# Patient Record
Sex: FEMALE | Race: WHITE | NOT HISPANIC OR LATINO
[De-identification: names, ages, dates, MRNs, and addresses within clinical notes are randomized per-mention and may not be internally consistent; named-entity substitution may affect disease eponyms.]

---

## 2017-01-03 ENCOUNTER — APPOINTMENT (OUTPATIENT)
Dept: INTERNAL MEDICINE | Facility: CLINIC | Age: 67
End: 2017-01-03

## 2017-01-24 ENCOUNTER — TRANSCRIPTION ENCOUNTER (OUTPATIENT)
Age: 67
End: 2017-01-24

## 2017-07-20 ENCOUNTER — APPOINTMENT (OUTPATIENT)
Dept: INTERNAL MEDICINE | Facility: CLINIC | Age: 67
End: 2017-07-20

## 2017-07-20 VITALS
DIASTOLIC BLOOD PRESSURE: 60 MMHG | HEIGHT: 63 IN | BODY MASS INDEX: 20.38 KG/M2 | OXYGEN SATURATION: 98 % | HEART RATE: 86 BPM | WEIGHT: 115 LBS | SYSTOLIC BLOOD PRESSURE: 100 MMHG

## 2017-10-30 ENCOUNTER — APPOINTMENT (OUTPATIENT)
Dept: INTERNAL MEDICINE | Facility: CLINIC | Age: 67
End: 2017-10-30
Payer: MEDICARE

## 2017-10-30 VITALS
SYSTOLIC BLOOD PRESSURE: 112 MMHG | DIASTOLIC BLOOD PRESSURE: 64 MMHG | OXYGEN SATURATION: 98 % | HEART RATE: 77 BPM | BODY MASS INDEX: 20.2 KG/M2 | WEIGHT: 114 LBS | HEIGHT: 63 IN

## 2017-10-30 PROCEDURE — 99214 OFFICE O/P EST MOD 30 MIN: CPT

## 2018-04-28 ENCOUNTER — EMERGENCY (EMERGENCY)
Facility: HOSPITAL | Age: 68
LOS: 1 days | Discharge: HOME | End: 2018-04-28
Attending: EMERGENCY MEDICINE
Payer: MEDICARE

## 2018-04-28 VITALS
HEART RATE: 81 BPM | TEMPERATURE: 98 F | DIASTOLIC BLOOD PRESSURE: 71 MMHG | RESPIRATION RATE: 16 BRPM | SYSTOLIC BLOOD PRESSURE: 114 MMHG | OXYGEN SATURATION: 99 %

## 2018-04-28 VITALS
RESPIRATION RATE: 18 BRPM | SYSTOLIC BLOOD PRESSURE: 107 MMHG | DIASTOLIC BLOOD PRESSURE: 72 MMHG | HEART RATE: 95 BPM | OXYGEN SATURATION: 100 % | TEMPERATURE: 98 F

## 2018-04-28 LAB
ALBUMIN SERPL ELPH-MCNC: 3.8 G/DL — SIGNIFICANT CHANGE UP (ref 3.3–5)
ALP SERPL-CCNC: 61 U/L — SIGNIFICANT CHANGE UP (ref 40–120)
ALT FLD-CCNC: 29 U/L — SIGNIFICANT CHANGE UP (ref 10–45)
ANION GAP SERPL CALC-SCNC: 11 MMOL/L — SIGNIFICANT CHANGE UP (ref 5–17)
APPEARANCE UR: CLEAR — SIGNIFICANT CHANGE UP
APTT BLD: 29.7 SEC — SIGNIFICANT CHANGE UP (ref 27.5–37.4)
AST SERPL-CCNC: 44 U/L — HIGH (ref 10–40)
BASE EXCESS BLDV CALC-SCNC: 0.9 MMOL/L — SIGNIFICANT CHANGE UP (ref -2–2)
BASOPHILS # BLD AUTO: 0 K/UL — SIGNIFICANT CHANGE UP (ref 0–0.2)
BASOPHILS NFR BLD AUTO: 0.6 % — SIGNIFICANT CHANGE UP (ref 0–2)
BILIRUB SERPL-MCNC: 0.7 MG/DL — SIGNIFICANT CHANGE UP (ref 0.2–1.2)
BILIRUB UR-MCNC: NEGATIVE — SIGNIFICANT CHANGE UP
BUN SERPL-MCNC: 9 MG/DL — SIGNIFICANT CHANGE UP (ref 7–23)
C DIFF GDH STL QL: NEGATIVE — SIGNIFICANT CHANGE UP
C DIFF GDH STL QL: SIGNIFICANT CHANGE UP
CA-I SERPL-SCNC: 1.16 MMOL/L — SIGNIFICANT CHANGE UP (ref 1.12–1.3)
CALCIUM SERPL-MCNC: 9.1 MG/DL — SIGNIFICANT CHANGE UP (ref 8.4–10.5)
CHLORIDE BLDV-SCNC: 110 MMOL/L — HIGH (ref 96–108)
CHLORIDE SERPL-SCNC: 106 MMOL/L — SIGNIFICANT CHANGE UP (ref 96–108)
CO2 BLDV-SCNC: 28 MMOL/L — SIGNIFICANT CHANGE UP (ref 22–30)
CO2 SERPL-SCNC: 23 MMOL/L — SIGNIFICANT CHANGE UP (ref 22–31)
COLOR SPEC: COLORLESS — SIGNIFICANT CHANGE UP
CREAT SERPL-MCNC: 0.89 MG/DL — SIGNIFICANT CHANGE UP (ref 0.5–1.3)
DIFF PNL FLD: NEGATIVE — SIGNIFICANT CHANGE UP
EOSINOPHIL # BLD AUTO: 0.1 K/UL — SIGNIFICANT CHANGE UP (ref 0–0.5)
EOSINOPHIL NFR BLD AUTO: 1.4 % — SIGNIFICANT CHANGE UP (ref 0–6)
GAS PNL BLDV: 136 MMOL/L — SIGNIFICANT CHANGE UP (ref 136–145)
GAS PNL BLDV: SIGNIFICANT CHANGE UP
GAS PNL BLDV: SIGNIFICANT CHANGE UP
GLUCOSE BLDV-MCNC: 116 MG/DL — HIGH (ref 70–99)
GLUCOSE SERPL-MCNC: 114 MG/DL — HIGH (ref 70–99)
GLUCOSE UR QL: NEGATIVE — SIGNIFICANT CHANGE UP
HCO3 BLDV-SCNC: 26 MMOL/L — SIGNIFICANT CHANGE UP (ref 21–29)
HCT VFR BLD CALC: 46.4 % — HIGH (ref 34.5–45)
HCT VFR BLDA CALC: 46 % — SIGNIFICANT CHANGE UP (ref 39–50)
HGB BLD CALC-MCNC: 15.1 G/DL — SIGNIFICANT CHANGE UP (ref 11.5–15.5)
HGB BLD-MCNC: 15.7 G/DL — HIGH (ref 11.5–15.5)
HOROWITZ INDEX BLDV+IHG-RTO: SIGNIFICANT CHANGE UP
INR BLD: 1.08 RATIO — SIGNIFICANT CHANGE UP (ref 0.88–1.16)
KETONES UR-MCNC: NEGATIVE — SIGNIFICANT CHANGE UP
LACTATE BLDV-MCNC: 1.3 MMOL/L — SIGNIFICANT CHANGE UP (ref 0.7–2)
LEUKOCYTE ESTERASE UR-ACNC: NEGATIVE — SIGNIFICANT CHANGE UP
LIDOCAIN IGE QN: 18 U/L — SIGNIFICANT CHANGE UP (ref 7–60)
LYMPHOCYTES # BLD AUTO: 0.6 K/UL — LOW (ref 1–3.3)
LYMPHOCYTES # BLD AUTO: 13.2 % — SIGNIFICANT CHANGE UP (ref 13–44)
MAGNESIUM SERPL-MCNC: 1.8 MG/DL — SIGNIFICANT CHANGE UP (ref 1.6–2.6)
MCHC RBC-ENTMCNC: 31.9 PG — SIGNIFICANT CHANGE UP (ref 27–34)
MCHC RBC-ENTMCNC: 33.8 GM/DL — SIGNIFICANT CHANGE UP (ref 32–36)
MCV RBC AUTO: 94.4 FL — SIGNIFICANT CHANGE UP (ref 80–100)
MONOCYTES # BLD AUTO: 0.3 K/UL — SIGNIFICANT CHANGE UP (ref 0–0.9)
MONOCYTES NFR BLD AUTO: 7.1 % — SIGNIFICANT CHANGE UP (ref 2–14)
NEUTROPHILS # BLD AUTO: 3.6 K/UL — SIGNIFICANT CHANGE UP (ref 1.8–7.4)
NEUTROPHILS NFR BLD AUTO: 77.7 % — HIGH (ref 43–77)
NITRITE UR-MCNC: NEGATIVE — SIGNIFICANT CHANGE UP
PCO2 BLDV: 47 MMHG — SIGNIFICANT CHANGE UP (ref 35–50)
PH BLDV: 7.37 — SIGNIFICANT CHANGE UP (ref 7.35–7.45)
PH UR: 6 — SIGNIFICANT CHANGE UP (ref 5–8)
PLATELET # BLD AUTO: 103 K/UL — LOW (ref 150–400)
PO2 BLDV: 27 MMHG — SIGNIFICANT CHANGE UP (ref 25–45)
POTASSIUM BLDV-SCNC: 3.6 MMOL/L — SIGNIFICANT CHANGE UP (ref 3.5–5)
POTASSIUM SERPL-MCNC: 3.7 MMOL/L — SIGNIFICANT CHANGE UP (ref 3.5–5.3)
POTASSIUM SERPL-SCNC: 3.7 MMOL/L — SIGNIFICANT CHANGE UP (ref 3.5–5.3)
PROT SERPL-MCNC: 6.5 G/DL — SIGNIFICANT CHANGE UP (ref 6–8.3)
PROT UR-MCNC: NEGATIVE — SIGNIFICANT CHANGE UP
PROTHROM AB SERPL-ACNC: 11.8 SEC — SIGNIFICANT CHANGE UP (ref 9.8–12.7)
RBC # BLD: 4.91 M/UL — SIGNIFICANT CHANGE UP (ref 3.8–5.2)
RBC # FLD: 12.7 % — SIGNIFICANT CHANGE UP (ref 10.3–14.5)
SAO2 % BLDV: 44 % — LOW (ref 67–88)
SODIUM SERPL-SCNC: 140 MMOL/L — SIGNIFICANT CHANGE UP (ref 135–145)
SP GR SPEC: 1.02 — SIGNIFICANT CHANGE UP (ref 1.01–1.02)
UROBILINOGEN FLD QL: NEGATIVE — SIGNIFICANT CHANGE UP
WBC # BLD: 4.6 K/UL — SIGNIFICANT CHANGE UP (ref 3.8–10.5)
WBC # FLD AUTO: 4.6 K/UL — SIGNIFICANT CHANGE UP (ref 3.8–10.5)

## 2018-04-28 PROCEDURE — 82330 ASSAY OF CALCIUM: CPT

## 2018-04-28 PROCEDURE — 96374 THER/PROPH/DIAG INJ IV PUSH: CPT | Mod: XU

## 2018-04-28 PROCEDURE — 85730 THROMBOPLASTIN TIME PARTIAL: CPT

## 2018-04-28 PROCEDURE — 85610 PROTHROMBIN TIME: CPT

## 2018-04-28 PROCEDURE — 82947 ASSAY GLUCOSE BLOOD QUANT: CPT

## 2018-04-28 PROCEDURE — 74177 CT ABD & PELVIS W/CONTRAST: CPT | Mod: 26

## 2018-04-28 PROCEDURE — 83605 ASSAY OF LACTIC ACID: CPT

## 2018-04-28 PROCEDURE — 81003 URINALYSIS AUTO W/O SCOPE: CPT

## 2018-04-28 PROCEDURE — 82803 BLOOD GASES ANY COMBINATION: CPT

## 2018-04-28 PROCEDURE — 87086 URINE CULTURE/COLONY COUNT: CPT

## 2018-04-28 PROCEDURE — 87449 NOS EACH ORGANISM AG IA: CPT

## 2018-04-28 PROCEDURE — 87045 FECES CULTURE AEROBIC BACT: CPT

## 2018-04-28 PROCEDURE — 84132 ASSAY OF SERUM POTASSIUM: CPT

## 2018-04-28 PROCEDURE — 82435 ASSAY OF BLOOD CHLORIDE: CPT

## 2018-04-28 PROCEDURE — 99284 EMERGENCY DEPT VISIT MOD MDM: CPT | Mod: 25

## 2018-04-28 PROCEDURE — 99284 EMERGENCY DEPT VISIT MOD MDM: CPT | Mod: GC

## 2018-04-28 PROCEDURE — 87177 OVA AND PARASITES SMEARS: CPT

## 2018-04-28 PROCEDURE — 74177 CT ABD & PELVIS W/CONTRAST: CPT

## 2018-04-28 PROCEDURE — 87324 CLOSTRIDIUM AG IA: CPT

## 2018-04-28 PROCEDURE — 84295 ASSAY OF SERUM SODIUM: CPT

## 2018-04-28 PROCEDURE — 85027 COMPLETE CBC AUTOMATED: CPT

## 2018-04-28 PROCEDURE — 83735 ASSAY OF MAGNESIUM: CPT

## 2018-04-28 PROCEDURE — 87046 STOOL CULTR AEROBIC BACT EA: CPT

## 2018-04-28 PROCEDURE — 80053 COMPREHEN METABOLIC PANEL: CPT

## 2018-04-28 PROCEDURE — 83690 ASSAY OF LIPASE: CPT

## 2018-04-28 PROCEDURE — 85014 HEMATOCRIT: CPT

## 2018-04-28 RX ORDER — SODIUM CHLORIDE 9 MG/ML
3 INJECTION INTRAMUSCULAR; INTRAVENOUS; SUBCUTANEOUS ONCE
Qty: 0 | Refills: 0 | Status: COMPLETED | OUTPATIENT
Start: 2018-04-28 | End: 2018-04-28

## 2018-04-28 RX ORDER — ACETAMINOPHEN 500 MG
1000 TABLET ORAL ONCE
Qty: 0 | Refills: 0 | Status: COMPLETED | OUTPATIENT
Start: 2018-04-28 | End: 2018-04-28

## 2018-04-28 RX ORDER — SODIUM CHLORIDE 9 MG/ML
1000 INJECTION INTRAMUSCULAR; INTRAVENOUS; SUBCUTANEOUS ONCE
Qty: 0 | Refills: 0 | Status: COMPLETED | OUTPATIENT
Start: 2018-04-28 | End: 2018-04-28

## 2018-04-28 RX ADMIN — SODIUM CHLORIDE 3 MILLILITER(S): 9 INJECTION INTRAMUSCULAR; INTRAVENOUS; SUBCUTANEOUS at 11:24

## 2018-04-28 RX ADMIN — Medication 1000 MILLIGRAM(S): at 12:10

## 2018-04-28 RX ADMIN — SODIUM CHLORIDE 1000 MILLILITER(S): 9 INJECTION INTRAMUSCULAR; INTRAVENOUS; SUBCUTANEOUS at 11:46

## 2018-04-28 RX ADMIN — Medication 400 MILLIGRAM(S): at 11:46

## 2018-04-28 NOTE — ED ADULT NURSE NOTE - OBJECTIVE STATEMENT
69 y/o female c/o liquid diarrhea since 6am yesterday, with abdominal pain and burping. No recent travel, no sick contacts.  Denies fevers, nausea, vomiting, constipation, chest pain,  dyspnea. Pt states she lost 2 lbs over the past two days, stopped taking Miralax.   Pt ate eggplant and cheese dish that patient thought started this Thursday night.  No acute respiratory distress noted.

## 2018-04-28 NOTE — ED PROVIDER NOTE - PHYSICAL EXAMINATION
Attending Hull: Gen: NAD, heent: atrauamtic, eomi, perrla, mmm, op pink, uvula midline, neck; nttp, no nuchal rigidity, chest: nttp, no crepitus, cv: rrr, no murmurs, lungs: ctab, abd: soft, nontender, nondistended, no peritoneal signs, +BS, no guarding, ext: wwp, neg homans, skin: no rash, neuro: awake and alert, following commands, speech clear, sensation and strength intact, no focal deficits Attending Hull: Gen: NAD, heent: atrauamtic, eomi, perrla, mmm, op pink, uvula midline, neck; nttp, no nuchal rigidity, chest: nttp, no crepitus, cv: rrr, no murmurs, lungs: ctab, abd: soft, mild lower abdominal ttp nondistended, no peritoneal signs, +BS, no guarding, ext: wwp, neg homans, skin: no rash, neuro: awake and alert, following commands, speech clear, sensation and strength intact, no focal deficits

## 2018-04-28 NOTE — ED PROVIDER NOTE - OBJECTIVE STATEMENT
68y Female PMH IBS, Hashimoto's thyroiditis, hiatal hernia, adhesions PSH gynecological surgery complaining of diarrhea. Has been occurring since 6am yesterday, liquidy, associated with abdominal pain with burping. No nausea/vomiting. No recent antibiotics. Has omeprazole for her stomach. No constipation. Denies fevers, nausea, vomiting, constipation, chest pain, or dyspnea. Lost 2 lbs over the past two days, stopped taking Miralax. Ate eggplant and cheese dish that patient thought started this Thursday night. No obvious sick contacts, no recent travel.    PCP: Dr. Jil Tolbert  GI: Dr. Jennings  Card: Dr. Parvez Marroquin

## 2018-04-28 NOTE — ED PROVIDER NOTE - MEDICAL DECISION MAKING DETAILS
Attending Hull: 67 y/o female presenting with lower abdominal pain with associated diarrhea. no fevers or chills. the pt does have a h/o IBS. no vomiting. no blood in the stool. on exam abd soft with mild lower abdominal discomfort. no fevers. CT performed to further evaluate for colitis vs enteritis which was negative. pt able to tolerate po and appears well hydrated. has a GI physician to follow up with. will d/c

## 2018-04-29 LAB
CULTURE RESULTS: NO GROWTH — SIGNIFICANT CHANGE UP
SPECIMEN SOURCE: SIGNIFICANT CHANGE UP

## 2018-04-30 LAB
CULTURE RESULTS: SIGNIFICANT CHANGE UP
CULTURE RESULTS: SIGNIFICANT CHANGE UP
SPECIMEN SOURCE: SIGNIFICANT CHANGE UP
SPECIMEN SOURCE: SIGNIFICANT CHANGE UP

## 2018-05-29 ENCOUNTER — APPOINTMENT (OUTPATIENT)
Dept: INTERNAL MEDICINE | Facility: CLINIC | Age: 68
End: 2018-05-29
Payer: MEDICARE

## 2018-05-29 VITALS
BODY MASS INDEX: 19.84 KG/M2 | DIASTOLIC BLOOD PRESSURE: 68 MMHG | WEIGHT: 112 LBS | SYSTOLIC BLOOD PRESSURE: 104 MMHG | HEIGHT: 63 IN

## 2018-05-29 DIAGNOSIS — R00.2 PALPITATIONS: ICD-10-CM

## 2018-05-29 DIAGNOSIS — R14.1 GAS PAIN: ICD-10-CM

## 2018-05-29 PROCEDURE — 99214 OFFICE O/P EST MOD 30 MIN: CPT

## 2018-05-30 NOTE — PHYSICAL EXAM
[Well Developed] : well developed [Well-Appearing] : well-appearing [Memory Grossly Normal] : memory grossly normal [Alert and Oriented x3] : oriented to person, place, and time [Normal Insight/Judgement] : insight and judgment were intact [de-identified] : mild scoliosis; calf muscle nl , sl tender in one spot [de-identified] : anxious, tearful at times;

## 2018-05-30 NOTE — ASSESSMENT
[FreeTextEntry1] : Pt w above issues.\par OP, mild scoliosis, IBS, insomnia/anxiety.\par Long discussion about trying talk therapy for her anxiety/depression  and options discussed. She seems amenable. She has not shared any of these thoughts w friends/SO/family and seemed to feel better after our discussion. I think she would feel much better in therapy.\par Reassured about some of her other concerns: \par prob Raynauds in fingers; nl memory changes for aging (offered neuropsych testing but reminded her there is no cure for Alz yet); drink more water for yellow urine.\par calf mm appears muscular strain, offered Doppler, declined.\par Insomnia- sleep hygiene discussed; would d/c valium 2.5 mg she says that won't be a problem; can try OTCs such as benadryl, Zquill, melatonin.\par Re Handicap permit, I told her I did not think I could fill out given her daily workouts at the gym etc.\par \par

## 2018-05-30 NOTE — HISTORY OF PRESENT ILLNESS
[FreeTextEntry1] : f/u [de-identified] : Wanted to update me on recent subspecialty visits and to discuss a few issues; requesting handicap car sticker.\par Ongoing IBS w bloating, diarrhea, constipation; had hemorrhoid and saw blood,  EGD/[place] uptodate; seeing Dr. Zuñiga GI. "spastic colon".\par Sciatica, scoliosis, OP; T score -3.1\par  ?Hashimotos thy: saw 3 Endos- Dr. Amy Tolbert, Dr. Sexton then Dr. Tillman, who says she has no thy issue, no Hashimotos.\par Dr. MANTILLA started her on Fosamax wkly in addition to Ca plus D.\par She is exercising very regularly at the gym,  but feels her muscles are not where they should be.\par Ongoing insomnia, takes half of a 5 mg Valium in middle of night (wakes up after falling asleep well), written for her by ?Dr. Marroquin cardiol.\par Doesn't sleep well though and often has a headache. Didn't do well w Ambien, doesn't remember trying Temazepam (on her old list). Hasn't tried OTCs.\par c/o urine too yellow someitmes, no burning.\par sees Cardiol for palps, anxiety, TR on echo...h/o dizziness/palps w extensive w/u incl stress test, cardiac cath at [facility] in the past.\par Ongoing anxiety/existential sadness about time passing, mortality, kids/grandkids growing up. Has not tried therapy as we discussed. Once had therapy at time of her divorce many yrs ago, amicable.\par Ongoing stress w one of her sons  ( he has advanced Crohns) who says mean things to her on regular basis. 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 no recent long flights etc.\par \par \par

## 2018-07-09 ENCOUNTER — EMERGENCY (EMERGENCY)
Facility: HOSPITAL | Age: 68
LOS: 1 days | Discharge: HOME | End: 2018-07-09
Attending: EMERGENCY MEDICINE
Payer: MEDICARE

## 2018-07-09 VITALS
SYSTOLIC BLOOD PRESSURE: 91 MMHG | OXYGEN SATURATION: 96 % | HEART RATE: 88 BPM | DIASTOLIC BLOOD PRESSURE: 58 MMHG | RESPIRATION RATE: 20 BRPM | TEMPERATURE: 98 F | WEIGHT: 108.91 LBS | HEIGHT: 64 IN

## 2018-07-09 VITALS
TEMPERATURE: 98 F | HEART RATE: 73 BPM | DIASTOLIC BLOOD PRESSURE: 86 MMHG | SYSTOLIC BLOOD PRESSURE: 119 MMHG | OXYGEN SATURATION: 100 % | RESPIRATION RATE: 15 BRPM

## 2018-07-09 LAB
ALBUMIN SERPL ELPH-MCNC: 4 G/DL — SIGNIFICANT CHANGE UP (ref 3.3–5)
ALP SERPL-CCNC: 51 U/L — SIGNIFICANT CHANGE UP (ref 40–120)
ALT FLD-CCNC: 16 U/L — SIGNIFICANT CHANGE UP (ref 10–45)
ANION GAP SERPL CALC-SCNC: 12 MMOL/L — SIGNIFICANT CHANGE UP (ref 5–17)
AST SERPL-CCNC: 25 U/L — SIGNIFICANT CHANGE UP (ref 10–40)
BASOPHILS # BLD AUTO: 0 K/UL — SIGNIFICANT CHANGE UP (ref 0–0.2)
BASOPHILS NFR BLD AUTO: 0.4 % — SIGNIFICANT CHANGE UP (ref 0–2)
BILIRUB SERPL-MCNC: 1 MG/DL — SIGNIFICANT CHANGE UP (ref 0.2–1.2)
BUN SERPL-MCNC: 8 MG/DL — SIGNIFICANT CHANGE UP (ref 7–23)
CALCIUM SERPL-MCNC: 8.4 MG/DL — SIGNIFICANT CHANGE UP (ref 8.4–10.5)
CHLORIDE SERPL-SCNC: 100 MMOL/L — SIGNIFICANT CHANGE UP (ref 96–108)
CO2 SERPL-SCNC: 24 MMOL/L — SIGNIFICANT CHANGE UP (ref 22–31)
CREAT SERPL-MCNC: 0.86 MG/DL — SIGNIFICANT CHANGE UP (ref 0.5–1.3)
EOSINOPHIL # BLD AUTO: 0 K/UL — SIGNIFICANT CHANGE UP (ref 0–0.5)
EOSINOPHIL NFR BLD AUTO: 0.9 % — SIGNIFICANT CHANGE UP (ref 0–6)
GLUCOSE SERPL-MCNC: 100 MG/DL — HIGH (ref 70–99)
HCT VFR BLD CALC: 41.3 % — SIGNIFICANT CHANGE UP (ref 34.5–45)
HGB BLD-MCNC: 13.9 G/DL — SIGNIFICANT CHANGE UP (ref 11.5–15.5)
HPIV3 RNA SPEC QL NAA+PROBE: DETECTED
LYMPHOCYTES # BLD AUTO: 0.7 K/UL — LOW (ref 1–3.3)
LYMPHOCYTES # BLD AUTO: 16.5 % — SIGNIFICANT CHANGE UP (ref 13–44)
MCHC RBC-ENTMCNC: 31.7 PG — SIGNIFICANT CHANGE UP (ref 27–34)
MCHC RBC-ENTMCNC: 33.7 GM/DL — SIGNIFICANT CHANGE UP (ref 32–36)
MCV RBC AUTO: 94.1 FL — SIGNIFICANT CHANGE UP (ref 80–100)
MONOCYTES # BLD AUTO: 0.6 K/UL — SIGNIFICANT CHANGE UP (ref 0–0.9)
MONOCYTES NFR BLD AUTO: 12.4 % — SIGNIFICANT CHANGE UP (ref 2–14)
NEUTROPHILS # BLD AUTO: 3.1 K/UL — SIGNIFICANT CHANGE UP (ref 1.8–7.4)
NEUTROPHILS NFR BLD AUTO: 69.9 % — SIGNIFICANT CHANGE UP (ref 43–77)
PLATELET # BLD AUTO: 82 K/UL — LOW (ref 150–400)
POTASSIUM SERPL-MCNC: 3.8 MMOL/L — SIGNIFICANT CHANGE UP (ref 3.5–5.3)
POTASSIUM SERPL-SCNC: 3.8 MMOL/L — SIGNIFICANT CHANGE UP (ref 3.5–5.3)
PROT SERPL-MCNC: 6.5 G/DL — SIGNIFICANT CHANGE UP (ref 6–8.3)
RAPID RVP RESULT: DETECTED
RBC # BLD: 4.39 M/UL — SIGNIFICANT CHANGE UP (ref 3.8–5.2)
RBC # FLD: 11.9 % — SIGNIFICANT CHANGE UP (ref 10.3–14.5)
SODIUM SERPL-SCNC: 136 MMOL/L — SIGNIFICANT CHANGE UP (ref 135–145)
WBC # BLD: 4.5 K/UL — SIGNIFICANT CHANGE UP (ref 3.8–10.5)
WBC # FLD AUTO: 4.5 K/UL — SIGNIFICANT CHANGE UP (ref 3.8–10.5)

## 2018-07-09 PROCEDURE — 71046 X-RAY EXAM CHEST 2 VIEWS: CPT | Mod: 26

## 2018-07-09 PROCEDURE — 87633 RESP VIRUS 12-25 TARGETS: CPT

## 2018-07-09 PROCEDURE — 99284 EMERGENCY DEPT VISIT MOD MDM: CPT

## 2018-07-09 PROCEDURE — 87486 CHLMYD PNEUM DNA AMP PROBE: CPT

## 2018-07-09 PROCEDURE — 85027 COMPLETE CBC AUTOMATED: CPT

## 2018-07-09 PROCEDURE — 87581 M.PNEUMON DNA AMP PROBE: CPT

## 2018-07-09 PROCEDURE — 87798 DETECT AGENT NOS DNA AMP: CPT

## 2018-07-09 PROCEDURE — 93005 ELECTROCARDIOGRAM TRACING: CPT

## 2018-07-09 PROCEDURE — 71046 X-RAY EXAM CHEST 2 VIEWS: CPT

## 2018-07-09 PROCEDURE — 84484 ASSAY OF TROPONIN QUANT: CPT

## 2018-07-09 PROCEDURE — 99283 EMERGENCY DEPT VISIT LOW MDM: CPT | Mod: 25

## 2018-07-09 PROCEDURE — 80053 COMPREHEN METABOLIC PANEL: CPT

## 2018-07-09 RX ORDER — IBUPROFEN 200 MG
600 TABLET ORAL ONCE
Qty: 0 | Refills: 0 | Status: COMPLETED | OUTPATIENT
Start: 2018-07-09 | End: 2018-07-09

## 2018-07-09 RX ORDER — SODIUM CHLORIDE 9 MG/ML
1000 INJECTION INTRAMUSCULAR; INTRAVENOUS; SUBCUTANEOUS ONCE
Qty: 0 | Refills: 0 | Status: COMPLETED | OUTPATIENT
Start: 2018-07-09 | End: 2018-07-09

## 2018-07-09 RX ADMIN — Medication 600 MILLIGRAM(S): at 12:09

## 2018-07-09 RX ADMIN — SODIUM CHLORIDE 1000 MILLILITER(S): 9 INJECTION INTRAMUSCULAR; INTRAVENOUS; SUBCUTANEOUS at 12:10

## 2018-07-09 NOTE — ED ADULT NURSE NOTE - DISCHARGE TEACHING
follow up with pcp. Return for worsening s/.s. Patient and spouse verbalized understanding of d.c instructions

## 2018-07-09 NOTE — ED ADULT NURSE NOTE - OBJECTIVE STATEMENT
67 y/o female presenting to the ED via walking in complaining of rhinorrhea, dry cough, and diffuse body aches x 5 days. Patient went to urgent care 3 days ago and was prescribed Levaquin 750 mg. Patient also complaining of episode of palpitations yesterday and today  lasting for approximately 30 seconds. Patient denies chest pain, dizziness, n/v/d, numbness, tingling, SOB, fevers. Patient also watched her grandchild 2 days ago who now has similar symptoms. EKG completed on arrival. CM in place. Safety and comfort measures provided. A&OX3. Spouse at bedside.

## 2018-07-09 NOTE — ED PROVIDER NOTE - CARE PLAN
Principal Discharge DX:	URI (upper respiratory infection)  Assessment and plan of treatment:	Follow up with your Primary Care Physician within the next 2-3 days  Stay Well Hydrated throughout the day  Take Tylenol 650mg every 6 hours as needed for fever and/or pain  You may take an over the counter cough suppressant  Bring a copy of your test results with you to your appointment  Continue your current medication regimen  Return to the Emergency Room if you experience new or worsening symptoms

## 2018-07-09 NOTE — ED ADULT NURSE REASSESSMENT NOTE - NS ED NURSE REASSESS COMMENT FT1
Patiewnt appears to be resting comfortably in stretcher. Patient denies dizziness, chest pain, SOB, n/v/d, numbness, tingling. A&OX3. Safety and comfort measures provided.

## 2018-07-09 NOTE — ED ADULT NURSE REASSESSMENT NOTE - NS ED NURSE REASSESS COMMENT FT1
Patient appears to be resting comfortably in stretcher. Patient denies chest pain, dizziness, n/v/d, numbness, tingling. Patient states body aches have been getting better after medication. A&OX3. Patient pending RVP result. Safety and comfort measures provided.

## 2018-07-09 NOTE — ED PROVIDER NOTE - MEDICAL DECISION MAKING DETAILS
67 y/o female presenting to the ED due to myalgias and rhinorrhea for the past 5 days. She went recently to an urgent care and was given antibiotics for suspicion of pneumonia, she states that she didn't had a CXR done. Denies fever. Physical exam shows tenderness in upper extremities, otherwise unremarkable. Lungs clear to auscultation. Patient appears to have a viral illness. Will order CXR to r/o pneumonia and discharge the patient with symptomatic treatment.

## 2018-07-09 NOTE — ED PROVIDER NOTE - ENMT NEGATIVE STATEMENT, MLM
Ears: no ear pain and no hearing problems.Nose: no nasal congestion.Mouth/Throat: no dysphagia, no hoarseness and no throat pain.Neck: no lumps, no pain, no stiffness and no swollen glands.

## 2018-07-09 NOTE — ED PROVIDER NOTE - OBJECTIVE STATEMENT
68 year old female w complaint of cough, rhinorrhea and diffuse myalgias for the past 5 days. She was given Levaquin 750mg at an urgent care 3 days ago which she has been compliant on. She admits to palpitations yesterday and today lasting for seconds at a time. She watched her grandchild 2 days ago who has since also developed a runny nose. She denies fever, chills. She spoke w her cardiologist Parvez Marroquin who told her she may have Pneumonia. She has not shad a chest xray yet.

## 2018-07-09 NOTE — ED PROVIDER NOTE - ATTENDING CONTRIBUTION TO CARE
69 y/o female presenting with myalgias and rhinorrhea for the past 5 days. Denies fever, SOB. She has been taking Levofloxacin after going to an urgent care, she didn't had an CXR done. Physical exam unremarkable apart from mild tenderness with palpation of muscles on her upper extremities. CXR unremarkable, no signs of pneumonia. Patient appears to have a clinical picture of viral URI that is already improving. Was discharged home with symptomatic treatment and follow up with private MD.

## 2018-10-04 PROBLEM — E06.3 AUTOIMMUNE THYROIDITIS: Chronic | Status: ACTIVE | Noted: 2018-04-28

## 2018-10-04 PROBLEM — K58.9 IRRITABLE BOWEL SYNDROME WITHOUT DIARRHEA: Chronic | Status: ACTIVE | Noted: 2018-04-28

## 2018-10-04 PROBLEM — K58.9 IRRITABLE BOWEL SYNDROME, UNSPECIFIED: Chronic | Status: ACTIVE | Noted: 2018-04-28

## 2018-10-30 ENCOUNTER — APPOINTMENT (OUTPATIENT)
Dept: INTERNAL MEDICINE | Facility: CLINIC | Age: 68
End: 2018-10-30
Payer: MEDICARE

## 2018-10-30 VITALS
HEART RATE: 72 BPM | DIASTOLIC BLOOD PRESSURE: 60 MMHG | HEIGHT: 63 IN | SYSTOLIC BLOOD PRESSURE: 110 MMHG | BODY MASS INDEX: 20.38 KG/M2 | OXYGEN SATURATION: 98 % | WEIGHT: 115 LBS

## 2018-10-30 PROCEDURE — 99214 OFFICE O/P EST MOD 30 MIN: CPT | Mod: 25

## 2018-10-30 PROCEDURE — G0008: CPT

## 2018-10-30 PROCEDURE — 90662 IIV NO PRSV INCREASED AG IM: CPT

## 2018-11-01 NOTE — PHYSICAL EXAM
[No Acute Distress] : no acute distress [Normal Oropharynx] : the oropharynx was normal [Supple] : supple [Clear to Auscultation] : lungs were clear to auscultation bilaterally [Regular Rhythm] : with a regular rhythm [No Edema] : there was no peripheral edema [No Joint Swelling] : no joint swelling [No Rash] : no rash [de-identified] : anxious  [de-identified] : whitish coating of tongue

## 2018-11-01 NOTE — ASSESSMENT
[FreeTextEntry1] : Pt w severe anxiety, now having ? hallucinations (scary scene and/or music) or petit mal seizures as described.\par I have asked her in the past to see a therapist, she now seems amenable to some degree:\par referral made to Dr. Roca\par Neuro eval dr. Ramon\par \par Flu shot given.

## 2018-11-01 NOTE — HISTORY OF PRESENT ILLNESS
[FreeTextEntry8] : Here to update me and some new concerns:\par left- sided headache and occas when looks to the right sees "something scary and dark" ??hallucination- ? seizure--\par sees a word or scary scene or hears a scary song...lasts 1-5 secs- not assoc w the HAs.\par    ongoing existential concerns about aging\par     URI over summer went to [facility] placed on Levaquin and had bad rxn w severe body pain/shoulder pain. was deemed allergy to Levaquin at [facility].\par    stressed about memory changes short-term, names\par    white patch on tongue ;  she does chew on Tums; she saw ex husb who is dDS who felt related to nasal sprays incl flonase, atrovent;placed her on Biotene hasn’t' started yet, dry mouth also saw ENT\par    corns on toes will see Podiatry\par saggy skin does not like it\par    insomnia takes a piece of valium\par

## 2019-01-15 ENCOUNTER — APPOINTMENT (OUTPATIENT)
Dept: INTERNAL MEDICINE | Facility: CLINIC | Age: 69
End: 2019-01-15
Payer: MEDICARE

## 2019-01-15 VITALS
SYSTOLIC BLOOD PRESSURE: 132 MMHG | DIASTOLIC BLOOD PRESSURE: 68 MMHG | TEMPERATURE: 97.4 F | OXYGEN SATURATION: 99 % | HEART RATE: 86 BPM | WEIGHT: 121 LBS | HEIGHT: 63 IN | BODY MASS INDEX: 21.44 KG/M2

## 2019-01-15 LAB
BILIRUB UR QL STRIP: NEGATIVE
CLARITY UR: NORMAL
COLLECTION METHOD: NORMAL
GLUCOSE UR-MCNC: NEGATIVE
HCG UR QL: 0.2 EU/DL
HGB UR QL STRIP.AUTO: NORMAL
KETONES UR-MCNC: NORMAL
LEUKOCYTE ESTERASE UR QL STRIP: NORMAL
NITRITE UR QL STRIP: NEGATIVE
PH UR STRIP: 6
PROT UR STRIP-MCNC: NEGATIVE
SP GR UR STRIP: 1.02

## 2019-01-15 PROCEDURE — 99214 OFFICE O/P EST MOD 30 MIN: CPT | Mod: 25

## 2019-01-15 PROCEDURE — 81003 URINALYSIS AUTO W/O SCOPE: CPT | Mod: QW

## 2019-01-15 NOTE — ASSESSMENT
[FreeTextEntry1] : u/a tr leuks, will send for culture. (no burning but frequncy)\par \par Imp: IBS-c, anxiety, OP, sinus issues\par \par Abdo CT scan\par \par Reassurance. Cannot eval memory until sleep issues resolved, she will f/u w Dr. Epstein, ask hm to send me consult.\par Again declines behavioral health.

## 2019-01-15 NOTE — HISTORY OF PRESENT ILLNESS
[FreeTextEntry1] : f/u [de-identified] : Seeing GI Dr. Zuñiga for ongoing issues w bloating, constipation- Trulance , Linzess, Miralax, tums,  Levsin, omeprazole, zenpep;\par sched for CT Abdo\par gained wt lately not exercising s/p holidays\par  recent labs submitted, fine (ongoing low plts) saw heme in past\par Seeing Neuro Dr. Epstein for anxiety, sleep and memory issues, he is trying her on Amitryptaline increasing dose for sleep/anxiety; had sleep study and only gets about 4 hrs no sleep apnea.\par Has not agreed to any therapy after all.\par ENT for sinus issues, on atrovent spray\par Endo Primo for OP on Alendronate wkly\par c/o incr urination, no dysuria\par c/o thinning hair\par \par c/o dry mouth, on Biotene\par

## 2019-06-21 ENCOUNTER — TRANSCRIPTION ENCOUNTER (OUTPATIENT)
Age: 69
End: 2019-06-21

## 2019-07-07 ENCOUNTER — TRANSCRIPTION ENCOUNTER (OUTPATIENT)
Age: 69
End: 2019-07-07

## 2019-08-06 ENCOUNTER — APPOINTMENT (OUTPATIENT)
Dept: INTERNAL MEDICINE | Facility: CLINIC | Age: 69
End: 2019-08-06
Payer: MEDICARE

## 2019-08-06 VITALS
HEIGHT: 63 IN | BODY MASS INDEX: 21.62 KG/M2 | TEMPERATURE: 97.5 F | HEART RATE: 77 BPM | SYSTOLIC BLOOD PRESSURE: 100 MMHG | OXYGEN SATURATION: 99 % | WEIGHT: 122 LBS | DIASTOLIC BLOOD PRESSURE: 60 MMHG

## 2019-08-06 PROCEDURE — 99214 OFFICE O/P EST MOD 30 MIN: CPT | Mod: 25

## 2019-08-06 PROCEDURE — 90670 PCV13 VACCINE IM: CPT

## 2019-08-06 PROCEDURE — G0009: CPT

## 2019-08-07 NOTE — ASSESSMENT
[FreeTextEntry1] : We discussed her new diagnosis, ways to combat forgetfulness, incl continuing her exercising and avoiding alcohol(doesn't drink).\par I will request note from Dr. Mehta.\par We reviewed her list of complaints, she was reassured (saggy skin, frizzy hair, etc)\par She does need counseling, might be amenable in the Fall. (has not been amenable in the past).\par Mammo/US\par Gyn\par Prevnar given.\par \par

## 2019-08-07 NOTE — PHYSICAL EXAM
[No Acute Distress] : no acute distress [Well Nourished] : well nourished [Well Developed] : well developed [Well-Appearing] : well-appearing [Normal Sclera/Conjunctiva] : normal sclera/conjunctiva [PERRL] : pupils equal round and reactive to light [EOMI] : extraocular movements intact [Normal Outer Ear/Nose] : the outer ears and nose were normal in appearance [Normal Oropharynx] : the oropharynx was normal [No JVD] : no jugular venous distention [Supple] : supple [No Lymphadenopathy] : no lymphadenopathy [Thyroid Normal, No Nodules] : the thyroid was normal and there were no nodules present [No Respiratory Distress] : no respiratory distress  [No Accessory Muscle Use] : no accessory muscle use [Clear to Auscultation] : lungs were clear to auscultation bilaterally [Normal Rate] : normal rate  [Regular Rhythm] : with a regular rhythm [Normal S1, S2] : normal S1 and S2 [No Murmur] : no murmur heard [No Carotid Bruits] : no carotid bruits [No Varicosities] : no varicosities [No Abdominal Bruit] : a ~M bruit was not heard ~T in the abdomen [No Edema] : there was no peripheral edema [Pedal Pulses Present] : the pedal pulses are present [No Palpable Aorta] : no palpable aorta [No Extremity Clubbing/Cyanosis] : no extremity clubbing/cyanosis [Soft] : abdomen soft [Non-distended] : non-distended [Non Tender] : non-tender [No Masses] : no abdominal mass palpated [No HSM] : no HSM [Normal Bowel Sounds] : normal bowel sounds [Normal Posterior Cervical Nodes] : no posterior cervical lymphadenopathy [Normal Anterior Cervical Nodes] : no anterior cervical lymphadenopathy [No CVA Tenderness] : no CVA  tenderness [No Spinal Tenderness] : no spinal tenderness [No Joint Swelling] : no joint swelling [Grossly Normal Strength/Tone] : grossly normal strength/tone [Coordination Grossly Intact] : coordination grossly intact [No Rash] : no rash [No Focal Deficits] : no focal deficits [Deep Tendon Reflexes (DTR)] : deep tendon reflexes were 2+ and symmetric [Normal Gait] : normal gait [Normal Affect] : the affect was normal [Normal Insight/Judgement] : insight and judgment were intact [de-identified] : memory issues- forgetting which doctor for certain conditions, etc ("this was not me") [de-identified] : baseline anxious but improved from prior;

## 2019-08-07 NOTE — HISTORY OF PRESENT ILLNESS
[FreeTextEntry1] : f/u [de-identified] : saw Neuro Dr Epstein, with c/o memory problems and hallucinations as previously noted-\par states she   had eval,  apparently dx  w early dementia-(I haven't received any consult note);started Arisept; has Not shared with her friends, but has told her kids altho downplayed it so far. With her friends she" pretends a lot".\par Also  on Amitryp for her anxiety/IBS/sleep.- no further hallucinations.\par She gained 10 lb  but does feel calmer. Still not overwt.  Is trying to keep up with exercising.\par \par Thy-hashimotos-Dr Margolis-hieu\par \par hot flashes-attribs to med\par c/o Hair now frizzes up\par \par Prevnar is due\par \par Mammo/US\par new Gyn names, prior retired\par \par \par  \par

## 2019-12-21 ENCOUNTER — EMERGENCY (EMERGENCY)
Facility: HOSPITAL | Age: 69
LOS: 1 days | Discharge: HOME | End: 2019-12-21
Attending: EMERGENCY MEDICINE | Admitting: EMERGENCY MEDICINE
Payer: MEDICARE

## 2019-12-21 VITALS
SYSTOLIC BLOOD PRESSURE: 92 MMHG | HEART RATE: 66 BPM | WEIGHT: 119.05 LBS | OXYGEN SATURATION: 97 % | DIASTOLIC BLOOD PRESSURE: 58 MMHG | HEIGHT: 63 IN | TEMPERATURE: 98 F | RESPIRATION RATE: 16 BRPM

## 2019-12-21 VITALS
RESPIRATION RATE: 16 BRPM | DIASTOLIC BLOOD PRESSURE: 68 MMHG | TEMPERATURE: 97 F | SYSTOLIC BLOOD PRESSURE: 110 MMHG | OXYGEN SATURATION: 98 % | HEART RATE: 70 BPM

## 2019-12-21 LAB
ALBUMIN SERPL ELPH-MCNC: 3.5 G/DL — SIGNIFICANT CHANGE UP (ref 3.3–5)
ALP SERPL-CCNC: 39 U/L — SIGNIFICANT CHANGE UP (ref 30–120)
ALT FLD-CCNC: 38 U/L DA — SIGNIFICANT CHANGE UP (ref 10–60)
ANION GAP SERPL CALC-SCNC: 8 MMOL/L — SIGNIFICANT CHANGE UP (ref 5–17)
AST SERPL-CCNC: 29 U/L — SIGNIFICANT CHANGE UP (ref 10–40)
BASOPHILS # BLD AUTO: 0.01 K/UL — SIGNIFICANT CHANGE UP (ref 0–0.2)
BASOPHILS NFR BLD AUTO: 0.1 % — SIGNIFICANT CHANGE UP (ref 0–2)
BILIRUB SERPL-MCNC: 1.6 MG/DL — HIGH (ref 0.2–1.2)
BUN SERPL-MCNC: 14 MG/DL — SIGNIFICANT CHANGE UP (ref 7–23)
CALCIUM SERPL-MCNC: 8.3 MG/DL — LOW (ref 8.4–10.5)
CHLORIDE SERPL-SCNC: 104 MMOL/L — SIGNIFICANT CHANGE UP (ref 96–108)
CO2 SERPL-SCNC: 29 MMOL/L — SIGNIFICANT CHANGE UP (ref 22–31)
CREAT SERPL-MCNC: 0.82 MG/DL — SIGNIFICANT CHANGE UP (ref 0.5–1.3)
EOSINOPHIL # BLD AUTO: 0.03 K/UL — SIGNIFICANT CHANGE UP (ref 0–0.5)
EOSINOPHIL NFR BLD AUTO: 0.3 % — SIGNIFICANT CHANGE UP (ref 0–6)
GLUCOSE SERPL-MCNC: 91 MG/DL — SIGNIFICANT CHANGE UP (ref 70–99)
HCT VFR BLD CALC: 38.7 % — SIGNIFICANT CHANGE UP (ref 34.5–45)
HGB BLD-MCNC: 12.8 G/DL — SIGNIFICANT CHANGE UP (ref 11.5–15.5)
IMM GRANULOCYTES NFR BLD AUTO: 0.3 % — SIGNIFICANT CHANGE UP (ref 0–1.5)
LYMPHOCYTES # BLD AUTO: 1.07 K/UL — SIGNIFICANT CHANGE UP (ref 1–3.3)
LYMPHOCYTES # BLD AUTO: 10.7 % — LOW (ref 13–44)
MCHC RBC-ENTMCNC: 30.5 PG — SIGNIFICANT CHANGE UP (ref 27–34)
MCHC RBC-ENTMCNC: 33.1 GM/DL — SIGNIFICANT CHANGE UP (ref 32–36)
MCV RBC AUTO: 92.1 FL — SIGNIFICANT CHANGE UP (ref 80–100)
MONOCYTES # BLD AUTO: 0.54 K/UL — SIGNIFICANT CHANGE UP (ref 0–0.9)
MONOCYTES NFR BLD AUTO: 5.4 % — SIGNIFICANT CHANGE UP (ref 2–14)
NEUTROPHILS # BLD AUTO: 8.33 K/UL — HIGH (ref 1.8–7.4)
NEUTROPHILS NFR BLD AUTO: 83.2 % — HIGH (ref 43–77)
NRBC # BLD: 0 /100 WBCS — SIGNIFICANT CHANGE UP (ref 0–0)
PLATELET # BLD AUTO: 89 K/UL — LOW (ref 150–400)
POTASSIUM SERPL-MCNC: 3.9 MMOL/L — SIGNIFICANT CHANGE UP (ref 3.5–5.3)
POTASSIUM SERPL-SCNC: 3.9 MMOL/L — SIGNIFICANT CHANGE UP (ref 3.5–5.3)
PROT SERPL-MCNC: 6 G/DL — SIGNIFICANT CHANGE UP (ref 6–8.3)
RBC # BLD: 4.2 M/UL — SIGNIFICANT CHANGE UP (ref 3.8–5.2)
RBC # FLD: 12.9 % — SIGNIFICANT CHANGE UP (ref 10.3–14.5)
SODIUM SERPL-SCNC: 141 MMOL/L — SIGNIFICANT CHANGE UP (ref 135–145)
WBC # BLD: 10.01 K/UL — SIGNIFICANT CHANGE UP (ref 3.8–10.5)
WBC # FLD AUTO: 10.01 K/UL — SIGNIFICANT CHANGE UP (ref 3.8–10.5)

## 2019-12-21 PROCEDURE — 96374 THER/PROPH/DIAG INJ IV PUSH: CPT

## 2019-12-21 PROCEDURE — 99284 EMERGENCY DEPT VISIT MOD MDM: CPT

## 2019-12-21 PROCEDURE — 74177 CT ABD & PELVIS W/CONTRAST: CPT | Mod: 26

## 2019-12-21 PROCEDURE — 36415 COLL VENOUS BLD VENIPUNCTURE: CPT

## 2019-12-21 PROCEDURE — 74177 CT ABD & PELVIS W/CONTRAST: CPT

## 2019-12-21 PROCEDURE — 99284 EMERGENCY DEPT VISIT MOD MDM: CPT | Mod: 25

## 2019-12-21 PROCEDURE — 85027 COMPLETE CBC AUTOMATED: CPT

## 2019-12-21 PROCEDURE — 80053 COMPREHEN METABOLIC PANEL: CPT

## 2019-12-21 PROCEDURE — 96375 TX/PRO/DX INJ NEW DRUG ADDON: CPT

## 2019-12-21 PROCEDURE — 96361 HYDRATE IV INFUSION ADD-ON: CPT

## 2019-12-21 RX ORDER — ONDANSETRON 8 MG/1
4 TABLET, FILM COATED ORAL ONCE
Refills: 0 | Status: COMPLETED | OUTPATIENT
Start: 2019-12-21 | End: 2019-12-21

## 2019-12-21 RX ORDER — MORPHINE SULFATE 50 MG/1
4 CAPSULE, EXTENDED RELEASE ORAL ONCE
Refills: 0 | Status: DISCONTINUED | OUTPATIENT
Start: 2019-12-21 | End: 2019-12-21

## 2019-12-21 RX ORDER — METRONIDAZOLE 500 MG
1 TABLET ORAL
Qty: 30 | Refills: 0
Start: 2019-12-21 | End: 2019-12-30

## 2019-12-21 RX ORDER — METRONIDAZOLE 500 MG
500 TABLET ORAL ONCE
Refills: 0 | Status: COMPLETED | OUTPATIENT
Start: 2019-12-21 | End: 2019-12-21

## 2019-12-21 RX ORDER — CIPROFLOXACIN LACTATE 400MG/40ML
1 VIAL (ML) INTRAVENOUS
Qty: 20 | Refills: 0
Start: 2019-12-21 | End: 2019-12-30

## 2019-12-21 RX ORDER — CIPROFLOXACIN LACTATE 400MG/40ML
500 VIAL (ML) INTRAVENOUS ONCE
Refills: 0 | Status: COMPLETED | OUTPATIENT
Start: 2019-12-21 | End: 2019-12-21

## 2019-12-21 RX ORDER — SODIUM CHLORIDE 9 MG/ML
1000 INJECTION INTRAMUSCULAR; INTRAVENOUS; SUBCUTANEOUS ONCE
Refills: 0 | Status: COMPLETED | OUTPATIENT
Start: 2019-12-21 | End: 2019-12-21

## 2019-12-21 RX ADMIN — SODIUM CHLORIDE 1000 MILLILITER(S): 9 INJECTION INTRAMUSCULAR; INTRAVENOUS; SUBCUTANEOUS at 14:58

## 2019-12-21 RX ADMIN — SODIUM CHLORIDE 1000 MILLILITER(S): 9 INJECTION INTRAMUSCULAR; INTRAVENOUS; SUBCUTANEOUS at 17:15

## 2019-12-21 RX ADMIN — Medication 500 MILLIGRAM(S): at 17:46

## 2019-12-21 RX ADMIN — ONDANSETRON 4 MILLIGRAM(S): 8 TABLET, FILM COATED ORAL at 14:58

## 2019-12-21 RX ADMIN — MORPHINE SULFATE 4 MILLIGRAM(S): 50 CAPSULE, EXTENDED RELEASE ORAL at 14:58

## 2019-12-21 RX ADMIN — MORPHINE SULFATE 4 MILLIGRAM(S): 50 CAPSULE, EXTENDED RELEASE ORAL at 15:10

## 2019-12-21 NOTE — ED PROVIDER NOTE - OBJECTIVE STATEMENT
70 y/o female with a PMHx of Hashimoto's thyroiditis, osteoporosis, IBS, presents to the ED c/o back pain radiating to right hip and flank s/p fall. Pt was wearing socks this morning at her home and was ambulating to the bathroom. She then slipped and fell backwards. Sx is exacerbated with palpitation, positional change, movement, and ambulation. Denies neck pain, arm pain, nausea, or SOB. Allergic to penicillin. Nonsmoker. No other complaints at this time. 70 y/o female with a PMHx of Hashimoto's thyroiditis, osteoporosis, IBS, presents to the ED c/o buttocks pain radiating to right hip and flank s/p fall. Pt was wearing socks this morning at her home and was ambulating to the bathroom. She then slipped and fell backwards. Sx is exacerbated with palpitation, positional change, movement, and ambulation. Denies neck pain, arm pain, nausea, or SOB. Allergic to penicillin. Nonsmoker. No other complaints at this time. 68 y/o female with a PMHx of Hashimoto's thyroiditis, osteoporosis, IBS, presents to the ED c/o buttocks pain radiating to right hip and flank s/p fall. Pt was wearing socks this morning at her home and was ambulating to the bathroom. She then slipped and fell backwards. Sx is exacerbated with palpitation, positional change, movement, and ambulation. Denies head injury, neck pain, arm pain, nausea, vomiting, dizziness, cp or SOB, incontinence. Allergic to penicillin. Nonsmoker. No other complaints at this time.

## 2019-12-21 NOTE — ED PROVIDER NOTE - ENMT, MLM
Airway patent, Nasal mucosa clear. Mouth with normal mucosa. Throat has no vesicles, no oropharyngeal exudates and uvula is midline. Airway patent. Mouth with normal mucosa. Throat has no vesicles, no oropharyngeal exudates and uvula is midline. NCAT

## 2019-12-21 NOTE — ED PROVIDER NOTE - PROVIDER TOKENS
PROVIDER:[TOKEN:[[id number]],FOLLOWUP:[1-3 Days]],PROVIDER:[TOKEN:[[id number]],FOLLOWUP:[1-3 Days]]

## 2019-12-21 NOTE — ED ADULT NURSE NOTE - CHPI ED NUR SYMPTOMS NEG
no confusion/no vomiting/no deformity/no numbness/no tingling/no fever/no loss of consciousness/no weakness

## 2019-12-21 NOTE — ED PROVIDER NOTE - CARE PLAN
Principal Discharge DX:	Pubic ramus fracture, right, closed, initial encounter  Secondary Diagnosis:	Diverticulitis

## 2019-12-21 NOTE — ED ADULT NURSE NOTE - NS ED NOTE ABUSE SUSPICION NEGLECT YN
Acknowledgement of Current Treatment Plan       I have reviewed my Initial Individual Treatment Plan with my therapist on 09/10/2019.   I agree with the plan as it is written in the electronic health record.                      Signature Below:  Zuleyma Reed      Patient      ROSE Petersen Psychotherapist    Admitting Provider: Admitting Provider Signature Below:   Dr Graham Thomas MD   Psychiatrist    Dr Alicja Lagos MD  Psychiatrist    Dr Ramy Adam MD  Psychiatrist    Dr Edi Ortiz MD  Psychiatrist    Maribeth Sterling, PALAK  Psychiatric Provider             No

## 2019-12-21 NOTE — ED PROVIDER NOTE - NONTENDER LOCATION
left lower quadrant/left upper quadrant/right upper quadrant/right lower quadrant/left costovertebral angle

## 2019-12-21 NOTE — ED PROVIDER NOTE - RESPIRATORY, MLM
Breath sounds clear and equal bilaterally. Breath sounds clear and equal bilaterally. Ribs nontender

## 2019-12-21 NOTE — ED PROVIDER NOTE - CARDIAC, MLM
Normal rate, regular rhythm.  Heart sounds S1, S2.  No murmurs, rubs or gallops. Normal rate, regular rhythm.  Heart sounds S1, S2.  Normal pulses b/l

## 2019-12-21 NOTE — ED ADULT TRIAGE NOTE - RESPIRATORY RATE (BREATHS/MIN)
Detail Level: Zone Hide Include Location In Plan Question?: No Include Location In Plan?: Yes 16 Detail Level: Simple

## 2019-12-21 NOTE — ED PROVIDER NOTE - SKIN, MLM
Skin normal color for race, warm, dry and intact. No evidence of rash. Skin normal color for race, warm, dry and intact. No evidence of ecchymosis, abrasion or laceration

## 2019-12-21 NOTE — ED PROVIDER NOTE - PATIENT PORTAL LINK FT
You can access the FollowMyHealth Patient Portal offered by [facility] by registering at the following website: [email or web address]. By joining [employer]’s FollowMyHealth portal, you will also be able to view your health information using other applications (apps) compatible with our system.

## 2019-12-21 NOTE — ED PROVIDER NOTE - CARE PROVIDER_API CALL
Sohail Salter)  Orthopaedic Surgery  [address] [place] [address]  Phone: [phone]  Fax: [phone]  Follow Up Time: 1-3 Days    Jil Tolbert)  Internal Medicine  [address], [address]  [place] [address]  Phone: [phone]  Fax: [phone]  Follow Up Time: 1-3 Days

## 2019-12-21 NOTE — ED PROVIDER NOTE - PROGRESS NOTE DETAILS
Reevaluated patient at bedside.  Patient feeling much improved, ambulated in er without assist.  Discussed the results of all diagnostic testing in ED and copies of all reports given.   An opportunity to ask questions was given.  Discussed the importance of prompt, close medical follow-up.  Patient will return with any changes, concerns or persistent / worsening symptoms.  Understanding of all instructions verbalized.

## 2019-12-21 NOTE — ED ADULT NURSE NOTE - OBJECTIVE STATEMENT
Pt came in for a fall incident. Pt stated that she slipped and fell on her right side buttock prior to arrival. Pt complains of right buttocks pain. pt denies any head injury > no headache

## 2019-12-26 PROBLEM — M81.0 AGE-RELATED OSTEOPOROSIS WITHOUT CURRENT PATHOLOGICAL FRACTURE: Chronic | Status: ACTIVE | Noted: 2019-12-21

## 2019-12-27 ENCOUNTER — APPOINTMENT (OUTPATIENT)
Dept: INTERNAL MEDICINE | Facility: CLINIC | Age: 69
End: 2019-12-27

## 2020-02-03 ENCOUNTER — APPOINTMENT (OUTPATIENT)
Dept: INTERNAL MEDICINE | Facility: CLINIC | Age: 70
End: 2020-02-03
Payer: MEDICARE

## 2020-02-03 VITALS
WEIGHT: 107 LBS | BODY MASS INDEX: 18.96 KG/M2 | TEMPERATURE: 97.8 F | OXYGEN SATURATION: 98 % | SYSTOLIC BLOOD PRESSURE: 110 MMHG | DIASTOLIC BLOOD PRESSURE: 60 MMHG | HEIGHT: 63 IN | HEART RATE: 70 BPM

## 2020-02-03 DIAGNOSIS — M79.662 PAIN IN LEFT LOWER LEG: ICD-10-CM

## 2020-02-03 PROCEDURE — 99215 OFFICE O/P EST HI 40 MIN: CPT

## 2020-02-03 NOTE — HISTORY OF PRESENT ILLNESS
[de-identified] : 70 yo woman w OP, IBS, divertiuclitis, memory problems, anxiety, Hashimoto's ds, autoimmune thrombocytopenia;\par now onProlia\par s/p fall  12/19 w Pelvic fx  (occurred while going to bathroom in socks on slippery wood floor), seeing Dr. Olvera;\par  in addition to mild diverticulitis (which is why she was going up and down to bathroom)\par ; s/p  ER, d/cd on Cipro and flagyl.\par Seeing GI Dr. Zuñiga, has f/u this afternoon. \par seeing Rheum- Dr. Soto-?new dx RA on Stellara injecs q 8 wks\par also now on Prolia\par Seeing Osteopath Dr. Aure Barajas for manipulation-she is rec CBD oil and crms\par \par c/o: cold hands and feet (x yrs), hair thinning, breast pain left side\par Re Breast pain- has implants- saw Osteopath for breast pain and had Mammo/Sono last wk at [facility], was told study is Nl. that pain is related to her injuries.\par c/o insomnia\par c/o pain, has Hydrocodone 7.5/300 using sparingly\par Mobic, not helping\par Trazadone not helping the sleep, OTCs did not help eg alleve pm or melatonin\par Has lost a lot of weight, states "this is actually my normal" (she is under her nl) and is happy at this very low wt\par Left calf pain; was ordered for doppler but did not have\par \par Meds: Creon, Arisept, Trazadone, D3 , Mag 400, Omper\par \par Currently she and s.o. staying in [facility] where bedroom on main flr w bathroom.\par \par  [FreeTextEntry1] : f/u\par \par pt was late, accomodated

## 2020-02-03 NOTE — REVIEW OF SYSTEMS
[Recent Change In Weight] : ~T recent weight change [Negative] : Psychiatric [FreeTextEntry2] : large wt loss

## 2020-02-03 NOTE — ASSESSMENT
[FreeTextEntry1] : Pt s/p major fall and fracture, now on Prolia.\par Recovering gradually, seeing Ortho, Rheum, Osteopath \par Has LLE calf tenderness, needs Doppler (has requisition from Dr Barajas) urgently (she did not understand the immediacy of this, will try to do today)\par GI for diverticultiis\par Had Mammo/US will obtain report\par Has upcomign gyn appointmt Dr. Escobar\par Requests Vasc for cold hands/feet\par Requests Derm for hair thinning (not new)\par Requests something for sleep- will try low dose of Ambien assuming she is not alone. \par d/c Trazadone. Do not mix w narcotic or etoh.\par Will add CBD per Osteopathic DO for pain.\par \par

## 2020-02-28 ENCOUNTER — APPOINTMENT (OUTPATIENT)
Dept: INTERNAL MEDICINE | Facility: CLINIC | Age: 70
End: 2020-02-28
Payer: MEDICARE

## 2020-02-28 VITALS
BODY MASS INDEX: 18.78 KG/M2 | SYSTOLIC BLOOD PRESSURE: 142 MMHG | WEIGHT: 106 LBS | HEART RATE: 79 BPM | HEIGHT: 63 IN | OXYGEN SATURATION: 98 % | DIASTOLIC BLOOD PRESSURE: 87 MMHG | TEMPERATURE: 97.7 F

## 2020-02-28 VITALS — BODY MASS INDEX: 18.78 KG/M2 | DIASTOLIC BLOOD PRESSURE: 80 MMHG | SYSTOLIC BLOOD PRESSURE: 140 MMHG | WEIGHT: 106 LBS

## 2020-02-28 PROCEDURE — 99214 OFFICE O/P EST MOD 30 MIN: CPT

## 2020-02-28 RX ORDER — ZOLPIDEM TARTRATE 5 MG/1
5 TABLET ORAL
Qty: 20 | Refills: 3 | Status: DISCONTINUED | COMMUNITY
Start: 2020-02-03 | End: 2020-02-28

## 2020-02-28 RX ORDER — TRAMADOL HYDROCHLORIDE 50 MG/1
50 TABLET, COATED ORAL TWICE DAILY
Qty: 40 | Refills: 3 | Status: DISCONTINUED | COMMUNITY
Start: 2019-12-26 | End: 2020-02-28

## 2020-02-28 RX ORDER — ALENDRONATE SODIUM 70 MG/1
70 TABLET ORAL
Refills: 0 | Status: DISCONTINUED | COMMUNITY
End: 2020-02-28

## 2020-02-28 NOTE — ASSESSMENT
[FreeTextEntry1] : Pt is improving gradually. Wt loss is concerning, long talk about healthy eating, Mirtaz will help her appetite.\par Liberalize diet.\par Not really taking anything for pain, ambulating better. \par Advised to take Brayan daily, not prn.\par Contin Mirtazapine.\par Fall prevention discussed.\par Contin P.T., CBD oil.\par Obtain records/note from Neuro. Dr. Epstein.

## 2020-02-28 NOTE — HISTORY OF PRESENT ILLNESS
[FreeTextEntry8] : Here for f/u-\par was late, accomodated\par \par Feeling better this am. Went to [facility] where she was moving more and able to ambulate better. Back in her own home now.\par Saw Dr. Epstein , had extensive testing incl EMGs, MRIs..no note received.\par He started her on Mirtazapine (?dose) and she took first dose last pm, slept well, has more appetite. Had not been eating or sleeping well at all.\par Started CBD oil from Ortho 2wks ago.\par Taking Brayan (?dose) prn, so not helping. Occas Motrin. Nothing else.\par \par Asking about B vits.\par \par Seeing Dr. Zuñiga next wk, has trouble eating many food groups- dairy, salads, does not eat meat.\par Wt is down to 106, 15 lb loss past few mos.

## 2020-02-28 NOTE — REVIEW OF SYSTEMS
[Recent Change In Weight] : ~T recent weight change [Negative] : Heme/Lymph [FreeTextEntry2] : large wt loss

## 2020-03-23 ENCOUNTER — APPOINTMENT (OUTPATIENT)
Dept: INTERNAL MEDICINE | Facility: CLINIC | Age: 70
End: 2020-03-23
Payer: MEDICARE

## 2020-03-23 PROCEDURE — 99441: CPT

## 2020-03-23 PROCEDURE — G2012 BRIEF CHECK IN BY MD/QHP: CPT

## 2020-05-25 ENCOUNTER — RESULT REVIEW (OUTPATIENT)
Age: 70
End: 2020-05-25

## 2020-05-26 ENCOUNTER — APPOINTMENT (OUTPATIENT)
Dept: OBGYN | Facility: CLINIC | Age: 70
End: 2020-05-26
Payer: MEDICARE

## 2020-05-26 PROCEDURE — 99203 OFFICE O/P NEW LOW 30 MIN: CPT

## 2020-07-20 ENCOUNTER — APPOINTMENT (OUTPATIENT)
Dept: INTERNAL MEDICINE | Facility: CLINIC | Age: 70
End: 2020-07-20
Payer: MEDICARE

## 2020-07-20 VITALS
BODY MASS INDEX: 19.14 KG/M2 | HEIGHT: 63 IN | TEMPERATURE: 99.8 F | DIASTOLIC BLOOD PRESSURE: 79 MMHG | HEART RATE: 65 BPM | SYSTOLIC BLOOD PRESSURE: 108 MMHG | OXYGEN SATURATION: 100 % | WEIGHT: 108 LBS

## 2020-07-20 PROCEDURE — 99214 OFFICE O/P EST MOD 30 MIN: CPT
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
## 2020-07-20 NOTE — PHYSICAL EXAM
[No Acute Distress] : no acute distress [Well Nourished] : well nourished [Well Developed] : well developed [Well-Appearing] : well-appearing [Normal Sclera/Conjunctiva] : normal sclera/conjunctiva [PERRL] : pupils equal round and reactive to light [EOMI] : extraocular movements intact [Normal Outer Ear/Nose] : the outer ears and nose were normal in appearance [Normal Oropharynx] : the oropharynx was normal [No JVD] : no jugular venous distention [No Lymphadenopathy] : no lymphadenopathy [Supple] : supple [Thyroid Normal, No Nodules] : the thyroid was normal and there were no nodules present [No Respiratory Distress] : no respiratory distress  [Clear to Auscultation] : lungs were clear to auscultation bilaterally [No Accessory Muscle Use] : no accessory muscle use [Normal Rate] : normal rate  [Regular Rhythm] : with a regular rhythm [Normal S1, S2] : normal S1 and S2 [No Murmur] : no murmur heard [No Carotid Bruits] : no carotid bruits [No Abdominal Bruit] : a ~M bruit was not heard ~T in the abdomen [No Varicosities] : no varicosities [Pedal Pulses Present] : the pedal pulses are present [No Edema] : there was no peripheral edema [No Palpable Aorta] : no palpable aorta [No Extremity Clubbing/Cyanosis] : no extremity clubbing/cyanosis [Soft] : abdomen soft [Non Tender] : non-tender [Non-distended] : non-distended [No Masses] : no abdominal mass palpated [Normal Bowel Sounds] : normal bowel sounds [No HSM] : no HSM [Normal Posterior Cervical Nodes] : no posterior cervical lymphadenopathy [Normal Anterior Cervical Nodes] : no anterior cervical lymphadenopathy [No CVA Tenderness] : no CVA  tenderness [No Joint Swelling] : no joint swelling [No Spinal Tenderness] : no spinal tenderness [Grossly Normal Strength/Tone] : grossly normal strength/tone [Coordination Grossly Intact] : coordination grossly intact [No Rash] : no rash [No Focal Deficits] : no focal deficits [Normal Gait] : normal gait [Normal Affect] : the affect was normal [Deep Tendon Reflexes (DTR)] : deep tendon reflexes were 2+ and symmetric [Normal Insight/Judgement] : insight and judgment were intact [de-identified] : thin, anxious, frail [de-identified] : numerous tender points, diffusely [de-identified] : frequently forgetting names of other doctors but otherwise intact

## 2020-07-24 ENCOUNTER — TRANSCRIPTION ENCOUNTER (OUTPATIENT)
Age: 70
End: 2020-07-24

## 2020-08-06 ENCOUNTER — APPOINTMENT (OUTPATIENT)
Dept: NEUROLOGY | Facility: CLINIC | Age: 70
End: 2020-08-06
Payer: MEDICARE

## 2020-08-06 VITALS
WEIGHT: 106 LBS | HEART RATE: 68 BPM | HEIGHT: 63 IN | SYSTOLIC BLOOD PRESSURE: 140 MMHG | BODY MASS INDEX: 18.78 KG/M2 | DIASTOLIC BLOOD PRESSURE: 80 MMHG

## 2020-08-06 VITALS — TEMPERATURE: 96.5 F

## 2020-08-06 PROCEDURE — 99205 OFFICE O/P NEW HI 60 MIN: CPT

## 2020-08-06 RX ORDER — COLD-HOT PACK
125 MCG EACH MISCELLANEOUS
Refills: 0 | Status: ACTIVE | COMMUNITY

## 2020-08-06 RX ORDER — CALCIUM CARBONATE/VITAMIN D3 500 MG-10
500-400 TABLET,CHEWABLE ORAL
Refills: 0 | Status: ACTIVE | COMMUNITY

## 2020-08-06 NOTE — HISTORY OF PRESENT ILLNESS
[FreeTextEntry1] : The patient is a 70-year-old, right-handed, nonhypertensive, nondiabetic woman, who comes in with a plethora of complaints. The first is memory loss. She states that she has had progressive memory loss for approximately one year and has been treated with memantine and donepezil, without any clear improvement. She has trouble with recent memory more than distant.  her other complaints include weight loss, skin wrinkles, head and neck pain, TMJ dysfunction, pain in the back and leg tingling in hands, hair loss, hot flashes, constipation, irritable bowel syndrome, post nasal drip, and poor sleep. She has been seen by at least 2 neurologists, 2 orthopedic surgeons, a pain management specialist, physical therapy, and in Gen. internal medicine. Through the myriad of complaints, it appears that the one that concerns her most is the memory loss. She cannot tell me when she last had psychometric testing.

## 2020-08-06 NOTE — PHYSICAL EXAM
[General Appearance - In No Acute Distress] : in no acute distress [General Appearance - Alert] : alert [Oriented To Time, Place, And Person] : oriented to person, place, and time [Person] : oriented to person [Place] : oriented to place [Time] : oriented to time [Concentration Intact] : normal concentrating ability [Naming Objects] : no difficulty naming common objects [Visual Intact] : visual attention was ~T not ~L decreased [Repeating Phrases] : no difficulty repeating a phrase [Writing A Sentence] : no difficulty writing a sentence [Fluency] : fluency intact [Comprehension] : comprehension intact [Reading] : reading intact [Past History] : adequate knowledge of personal past history [Cranial Nerves Optic (II)] : visual acuity intact bilaterally,  visual fields full to confrontation, pupils equal round and reactive to light [Cranial Nerves Oculomotor (III)] : extraocular motion intact [Cranial Nerves Trigeminal (V)] : facial sensation intact symmetrically [Cranial Nerves Facial (VII)] : face symmetrical [Cranial Nerves Vestibulocochlear (VIII)] : hearing was intact bilaterally [Cranial Nerves Glossopharyngeal (IX)] : tongue and palate midline [Cranial Nerves Accessory (XI - Cranial And Spinal)] : head turning and shoulder shrug symmetric [Cranial Nerves Hypoglossal (XII)] : there was no tongue deviation with protrusion [Motor Strength] : muscle strength was normal in all four extremities [No Muscle Atrophy] : normal bulk in all four extremities [Sensation Tactile Decrease] : light touch was intact [Balance] : balance was intact [2+] : Ankle jerk left 2+ [Sclera] : the sclera and conjunctiva were normal [PERRL With Normal Accommodation] : pupils were equal in size, round, reactive to light, with normal accommodation [Extraocular Movements] : extraocular movements were intact [Outer Ear] : the ears and nose were normal in appearance [Oropharynx] : the oropharynx was normal [Neck Appearance] : the appearance of the neck was normal [Neck Cervical Mass (___cm)] : no neck mass was observed [Jugular Venous Distention Increased] : there was no jugular-venous distention [Thyroid Diffuse Enlargement] : the thyroid was not enlarged [Thyroid Nodule] : there were no palpable thyroid nodules [Auscultation Breath Sounds / Voice Sounds] : lungs were clear to auscultation bilaterally [Heart Rate And Rhythm] : heart rate was normal and rhythm regular [Heart Sounds] : normal S1 and S2 [Murmurs] : no murmurs [Heart Sounds Gallop] : no gallops [Heart Sounds Pericardial Friction Rub] : no pericardial rub [Full Pulse] : the pedal pulses are present [Edema] : there was no peripheral edema [No CVA Tenderness] : no ~M costovertebral angle tenderness [No Spinal Tenderness] : no spinal tenderness [Abnormal Walk] : normal gait [Nail Clubbing] : no clubbing  or cyanosis of the fingernails [Musculoskeletal - Swelling] : no joint swelling seen [Motor Tone] : muscle strength and tone were normal [Skin Color & Pigmentation] : normal skin color and pigmentation [Skin Turgor] : normal skin turgor [] : no rash [FreeTextEntry1] : There is impairment of recent recall. [Short Term Intact] : short term memory impaired [Tremor] : no tremor present [Past-pointing] : there was no past-pointing [Plantar Reflex Left Only] : normal on the left [Plantar Reflex Right Only] : normal on the right
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
## 2020-09-01 RX ORDER — ZOLPIDEM TARTRATE 5 MG/1
5 TABLET ORAL
Qty: 30 | Refills: 3 | Status: DISCONTINUED | COMMUNITY
Start: 2020-03-10 | End: 2020-09-01

## 2020-09-01 RX ORDER — MIRTAZAPINE 7.5 MG/1
7.5 TABLET, FILM COATED ORAL
Qty: 30 | Refills: 3 | Status: DISCONTINUED | COMMUNITY
Start: 2020-07-20 | End: 2020-09-01

## 2020-09-15 ENCOUNTER — APPOINTMENT (OUTPATIENT)
Dept: INTERNAL MEDICINE | Facility: CLINIC | Age: 70
End: 2020-09-15
Payer: MEDICARE

## 2020-09-15 VITALS
OXYGEN SATURATION: 97 % | WEIGHT: 111 LBS | SYSTOLIC BLOOD PRESSURE: 126 MMHG | HEIGHT: 63 IN | DIASTOLIC BLOOD PRESSURE: 77 MMHG | TEMPERATURE: 98.9 F | BODY MASS INDEX: 19.67 KG/M2 | HEART RATE: 96 BPM

## 2020-09-15 DIAGNOSIS — R63.4 ABNORMAL WEIGHT LOSS: ICD-10-CM

## 2020-09-15 PROCEDURE — 99215 OFFICE O/P EST HI 40 MIN: CPT

## 2020-09-15 NOTE — PHYSICAL EXAM
[Well Nourished] : well nourished [No Acute Distress] : no acute distress [Well-Appearing] : well-appearing [Normal Sclera/Conjunctiva] : normal sclera/conjunctiva [Well Developed] : well developed [EOMI] : extraocular movements intact [PERRL] : pupils equal round and reactive to light [Normal Outer Ear/Nose] : the outer ears and nose were normal in appearance [Normal Oropharynx] : the oropharynx was normal [No Lymphadenopathy] : no lymphadenopathy [No JVD] : no jugular venous distention [Thyroid Normal, No Nodules] : the thyroid was normal and there were no nodules present [No Respiratory Distress] : no respiratory distress  [Supple] : supple [Clear to Auscultation] : lungs were clear to auscultation bilaterally [Normal Rate] : normal rate  [No Accessory Muscle Use] : no accessory muscle use [No Murmur] : no murmur heard [Regular Rhythm] : with a regular rhythm [Normal S1, S2] : normal S1 and S2 [No Carotid Bruits] : no carotid bruits [No Abdominal Bruit] : a ~M bruit was not heard ~T in the abdomen [Pedal Pulses Present] : the pedal pulses are present [No Edema] : there was no peripheral edema [No Varicosities] : no varicosities [Soft] : abdomen soft [No Extremity Clubbing/Cyanosis] : no extremity clubbing/cyanosis [No Palpable Aorta] : no palpable aorta [Non Tender] : non-tender [Non-distended] : non-distended [No HSM] : no HSM [No Masses] : no abdominal mass palpated [No CVA Tenderness] : no CVA  tenderness [Normal Posterior Cervical Nodes] : no posterior cervical lymphadenopathy [Normal Anterior Cervical Nodes] : no anterior cervical lymphadenopathy [Normal Bowel Sounds] : normal bowel sounds [No Joint Swelling] : no joint swelling [No Spinal Tenderness] : no spinal tenderness [Grossly Normal Strength/Tone] : grossly normal strength/tone [Coordination Grossly Intact] : coordination grossly intact [No Rash] : no rash [No Focal Deficits] : no focal deficits [Normal Gait] : normal gait [Normal Insight/Judgement] : insight and judgment were intact [Normal Affect] : the affect was normal [de-identified] : thin, anxious

## 2020-09-15 NOTE — ASSESSMENT
[FreeTextEntry1] : Encounter w pt and partner Sy about situation.\par We discussed new dx of Alzheimers and her need for support both physically and emotionally.\par Course of disease and prognosis discussed.\par I asked him to accompany her on her visits, he has not been going in.\par Importance of nutrition and exercise. \par \par Treatmt of anxiety-Clonopin pending visit to Psychiatrist.\par \par HCP and advance care  planning discussed, needs to discuss wishes  w partner and kids. \par Her kids would be her HCP- Lewis Dillard and Matteo Mata. \par I will call her son.\par \par She is seeing Rheum, may benefit from Gabapentin/Lyrica for FBM.\par Referred to Nutritionist and Acupunctre.\par check labs today\par f/u one mo
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
## 2020-09-16 LAB
25(OH)D3 SERPL-MCNC: 66.3 NG/ML
ALBUMIN SERPL ELPH-MCNC: 4.5 G/DL
ALP BLD-CCNC: 67 U/L
ALT SERPL-CCNC: 15 U/L
ANION GAP SERPL CALC-SCNC: 14 MMOL/L
AST SERPL-CCNC: 24 U/L
BASOPHILS # BLD AUTO: 0.04 K/UL
BASOPHILS NFR BLD AUTO: 0.5 %
BILIRUB SERPL-MCNC: 0.9 MG/DL
BUN SERPL-MCNC: 22 MG/DL
CALCIUM SERPL-MCNC: 10.4 MG/DL
CHLORIDE SERPL-SCNC: 101 MMOL/L
CHOLEST SERPL-MCNC: 225 MG/DL
CHOLEST/HDLC SERPL: 1.8 RATIO
CO2 SERPL-SCNC: 29 MMOL/L
CREAT SERPL-MCNC: 0.91 MG/DL
EOSINOPHIL # BLD AUTO: 0.08 K/UL
EOSINOPHIL NFR BLD AUTO: 1 %
ERYTHROCYTE [SEDIMENTATION RATE] IN BLOOD BY WESTERGREN METHOD: 3 MM/HR
ESTIMATED AVERAGE GLUCOSE: 105 MG/DL
GLUCOSE SERPL-MCNC: 106 MG/DL
HBA1C MFR BLD HPLC: 5.3 %
HCT VFR BLD CALC: 44.5 %
HDLC SERPL-MCNC: 122 MG/DL
HGB BLD-MCNC: 14.1 G/DL
IMM GRANULOCYTES NFR BLD AUTO: 0.1 %
LDLC SERPL CALC-MCNC: 70 MG/DL
LYMPHOCYTES # BLD AUTO: 1.84 K/UL
LYMPHOCYTES NFR BLD AUTO: 24 %
MAN DIFF?: NORMAL
MCHC RBC-ENTMCNC: 30.9 PG
MCHC RBC-ENTMCNC: 31.7 GM/DL
MCV RBC AUTO: 97.4 FL
MONOCYTES # BLD AUTO: 0.36 K/UL
MONOCYTES NFR BLD AUTO: 4.7 %
NEUTROPHILS # BLD AUTO: 5.35 K/UL
NEUTROPHILS NFR BLD AUTO: 69.7 %
PLATELET # BLD AUTO: 137 K/UL
POTASSIUM SERPL-SCNC: 4.2 MMOL/L
PROT SERPL-MCNC: 6.7 G/DL
RBC # BLD: 4.57 M/UL
RBC # FLD: 13.1 %
SODIUM SERPL-SCNC: 144 MMOL/L
TRIGL SERPL-MCNC: 165 MG/DL
TSH SERPL-ACNC: 1.22 UIU/ML
WBC # FLD AUTO: 7.68 K/UL

## 2020-09-24 ENCOUNTER — APPOINTMENT (OUTPATIENT)
Dept: INTERNAL MEDICINE | Facility: CLINIC | Age: 70
End: 2020-09-24

## 2020-10-05 ENCOUNTER — APPOINTMENT (OUTPATIENT)
Dept: NEUROLOGY | Facility: CLINIC | Age: 70
End: 2020-10-05

## 2020-11-09 ENCOUNTER — APPOINTMENT (OUTPATIENT)
Dept: INTERNAL MEDICINE | Facility: CLINIC | Age: 70
End: 2020-11-09

## 2021-01-26 ENCOUNTER — APPOINTMENT (OUTPATIENT)
Dept: INTERNAL MEDICINE | Facility: CLINIC | Age: 71
End: 2021-01-26

## 2021-02-12 ENCOUNTER — APPOINTMENT (OUTPATIENT)
Dept: INTERNAL MEDICINE | Facility: CLINIC | Age: 71
End: 2021-02-12
Payer: MEDICARE

## 2021-02-12 VITALS
BODY MASS INDEX: 19.84 KG/M2 | HEIGHT: 63 IN | OXYGEN SATURATION: 96 % | WEIGHT: 112 LBS | SYSTOLIC BLOOD PRESSURE: 109 MMHG | HEART RATE: 62 BPM | DIASTOLIC BLOOD PRESSURE: 70 MMHG | TEMPERATURE: 97.3 F

## 2021-02-12 DIAGNOSIS — S32.9XXA FRACTURE OF UNSPECIFIED PARTS OF LUMBOSACRAL SPINE AND PELVIS, INITIAL ENCOUNTER FOR CLOSED FRACTURE: ICD-10-CM

## 2021-02-12 PROCEDURE — 99214 OFFICE O/P EST MOD 30 MIN: CPT

## 2021-02-12 RX ORDER — ALPRAZOLAM 0.25 MG/1
0.25 TABLET ORAL
Qty: 30 | Refills: 0 | Status: DISCONTINUED | COMMUNITY
Start: 2020-03-23 | End: 2021-02-12

## 2021-02-12 NOTE — PHYSICAL EXAM
[No Acute Distress] : no acute distress [Well Nourished] : well nourished [Well Developed] : well developed [Well-Appearing] : well-appearing [Normal Sclera/Conjunctiva] : normal sclera/conjunctiva [PERRL] : pupils equal round and reactive to light [EOMI] : extraocular movements intact [Normal Outer Ear/Nose] : the outer ears and nose were normal in appearance [Normal Oropharynx] : the oropharynx was normal [No JVD] : no jugular venous distention [No Lymphadenopathy] : no lymphadenopathy [Supple] : supple [Thyroid Normal, No Nodules] : the thyroid was normal and there were no nodules present [No Respiratory Distress] : no respiratory distress  [No Accessory Muscle Use] : no accessory muscle use [Clear to Auscultation] : lungs were clear to auscultation bilaterally [Normal Rate] : normal rate  [Regular Rhythm] : with a regular rhythm [Normal S1, S2] : normal S1 and S2 [No Murmur] : no murmur heard [No Carotid Bruits] : no carotid bruits [No Abdominal Bruit] : a ~M bruit was not heard ~T in the abdomen [No Varicosities] : no varicosities [Pedal Pulses Present] : the pedal pulses are present [No Edema] : there was no peripheral edema [No Palpable Aorta] : no palpable aorta [No Extremity Clubbing/Cyanosis] : no extremity clubbing/cyanosis [Soft] : abdomen soft [Non Tender] : non-tender [Non-distended] : non-distended [No Masses] : no abdominal mass palpated [No HSM] : no HSM [Normal Bowel Sounds] : normal bowel sounds [Normal Posterior Cervical Nodes] : no posterior cervical lymphadenopathy [Normal Anterior Cervical Nodes] : no anterior cervical lymphadenopathy [No CVA Tenderness] : no CVA  tenderness [No Spinal Tenderness] : no spinal tenderness [No Joint Swelling] : no joint swelling [Grossly Normal Strength/Tone] : grossly normal strength/tone [No Rash] : no rash [Coordination Grossly Intact] : coordination grossly intact [No Focal Deficits] : no focal deficits [Normal Gait] : normal gait [Deep Tendon Reflexes (DTR)] : deep tendon reflexes were 2+ and symmetric [Normal Affect] : the affect was normal [Normal Insight/Judgement] : insight and judgment were intact [de-identified] : thin, frail [de-identified] : tender and tight trapezius bilat [de-identified] : postive tender points incl legs, arms, chest wall. [de-identified] : petechiae lower arms
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
## 2021-02-12 NOTE — HISTORY OF PRESENT ILLNESS
[FreeTextEntry1] : f/u [de-identified] : 70 yo woman w OP, Raynauds sx, thrombocytopenia, IBS, diverticulosis, trauma, memory loss.\par 12/19 pt had severe fall causing pelvic fx while being treated for divertuclitis. She had a long and difficult recovery. Still some residuals including neck and back pain. Is doing P.T. which does help. Is not taking any NSAIDs.\par She sees Lakeisha tobias but  apparently  he is ill and she needs new rheum. She gets Prolia injecs.\par She is also on Flexeril ;  Nifedipine for Raynauds which is helping her. She feels since the accident her whole body is more sensitive to touch, with  trouble wearing even a sports bra or any tight clothing.\par Neuro has her on Memantine and Arisept, Temaz for sleep. \par She is using a lot of memory apps, puzzles, word games; she does exercise has treadmill. She is bothered by her memory issues but they do seem stable for now. \par She is eating better and put on a little wt, which she needed.\par EGD/[place] Dr. Zuñiga last wk. Hems/divertics.\par Using Viviscal for hair thinning.\par She'd like a handicap sticker for car.\par she is back out in [facility] w BF. She turned 71 last wk. BF and kids very supportive.\par Had first Covid vax, 2nd next wk.\par Due for Pneumovax but will hold off for Covid vax.

## 2021-03-05 LAB
BASOPHILS # BLD AUTO: 0.04 K/UL
BASOPHILS NFR BLD AUTO: 0.7 %
EOSINOPHIL # BLD AUTO: 0.11 K/UL
EOSINOPHIL NFR BLD AUTO: 1.8 %
ERYTHROCYTE [SEDIMENTATION RATE] IN BLOOD BY WESTERGREN METHOD: 8 MM/HR
HCT VFR BLD CALC: 47.1 %
HGB BLD-MCNC: 15.3 G/DL
IMM GRANULOCYTES NFR BLD AUTO: 0.3 %
LYMPHOCYTES # BLD AUTO: 1.79 K/UL
LYMPHOCYTES NFR BLD AUTO: 29.2 %
MAN DIFF?: NORMAL
MCHC RBC-ENTMCNC: 31.9 PG
MCHC RBC-ENTMCNC: 32.5 GM/DL
MCV RBC AUTO: 98.1 FL
MONOCYTES # BLD AUTO: 0.41 K/UL
MONOCYTES NFR BLD AUTO: 6.7 %
NEUTROPHILS # BLD AUTO: 3.76 K/UL
NEUTROPHILS NFR BLD AUTO: 61.3 %
PLATELET # BLD AUTO: 139 K/UL
RBC # BLD: 4.8 M/UL
RBC # FLD: 12.6 %
WBC # FLD AUTO: 6.13 K/UL

## 2021-03-22 ENCOUNTER — RX RENEWAL (OUTPATIENT)
Age: 71
End: 2021-03-22

## 2021-03-22 ENCOUNTER — APPOINTMENT (OUTPATIENT)
Dept: RHEUMATOLOGY | Facility: CLINIC | Age: 71
End: 2021-03-22
Payer: MEDICARE

## 2021-03-22 VITALS
SYSTOLIC BLOOD PRESSURE: 97 MMHG | WEIGHT: 116 LBS | OXYGEN SATURATION: 98 % | DIASTOLIC BLOOD PRESSURE: 65 MMHG | HEART RATE: 65 BPM | BODY MASS INDEX: 20.55 KG/M2 | TEMPERATURE: 96.4 F | HEIGHT: 63 IN

## 2021-03-22 PROCEDURE — 99205 OFFICE O/P NEW HI 60 MIN: CPT

## 2021-03-23 LAB
25(OH)D3 SERPL-MCNC: 74.4 NG/ML
ALBUMIN SERPL ELPH-MCNC: 4.4 G/DL
ALP BLD-CCNC: 63 U/L
ALT SERPL-CCNC: 12 U/L
ANION GAP SERPL CALC-SCNC: 12 MMOL/L
AST SERPL-CCNC: 23 U/L
BILIRUB SERPL-MCNC: 0.9 MG/DL
BUN SERPL-MCNC: 17 MG/DL
CALCIUM SERPL-MCNC: 9.3 MG/DL
CHLORIDE SERPL-SCNC: 101 MMOL/L
CO2 SERPL-SCNC: 31 MMOL/L
CREAT SERPL-MCNC: 0.9 MG/DL
GLUCOSE SERPL-MCNC: 89 MG/DL
POTASSIUM SERPL-SCNC: 4.1 MMOL/L
PROT SERPL-MCNC: 6.7 G/DL
SODIUM SERPL-SCNC: 144 MMOL/L

## 2021-05-04 ENCOUNTER — APPOINTMENT (OUTPATIENT)
Dept: INTERNAL MEDICINE | Facility: CLINIC | Age: 71
End: 2021-05-04
Payer: MEDICARE

## 2021-05-04 VITALS
WEIGHT: 114 LBS | TEMPERATURE: 97.6 F | SYSTOLIC BLOOD PRESSURE: 105 MMHG | HEIGHT: 63 IN | HEART RATE: 85 BPM | OXYGEN SATURATION: 97 % | DIASTOLIC BLOOD PRESSURE: 68 MMHG | BODY MASS INDEX: 20.2 KG/M2

## 2021-05-04 DIAGNOSIS — M79.669 PAIN IN UNSPECIFIED LOWER LEG: ICD-10-CM

## 2021-05-04 PROCEDURE — 99215 OFFICE O/P EST HI 40 MIN: CPT

## 2021-05-04 NOTE — HISTORY OF PRESENT ILLNESS
[FreeTextEntry1] : f/u [de-identified] : Pt w long list of concerns numbering about 30. We've reviewed these each time, I've advised her that our visit can be better spent discussing relevant concerns. She apologizes but must go thru the list.\par Too numerous to list but some new mentions:\par -tinnitus, seeing ENt\par -fine tremor of hands. About 9 mos. Hard to put her makeup on. Seeing Neuro Dr. Ramon, he started her on antidepressant Trintellix recently. He's done a skin/muscle biopsy. results pending. has neck and shoulder pain, he did trigger point injections.\par Await consult note..\par -extreme skin sensitivity, wears sports bra but otherwise no bra.Saw Rheum, probable dx of FBM.\par -seeing psychologist weekly now, thinks it may be helping\par -feeling lonely w Covid, boyfriend watches sports most of the time, [place] [facility], she's not working so has a lot of time on her hands to worry. When she looks in the mirror she feels she looks old, can't handle it.\par -calf mm are atrophied or swollen, she worries about a DVT\par -on Nifedpine 30 for Raynauds, not sure if doing anything\par \par Had Mammo at [facility] in [place], hasn't heard back.\par \par Meds: Trintellix, Memantine, Donezapril, metopr, omepr, Temazepam, cyclobenzarpinre, Ca plu D\par

## 2021-05-04 NOTE — ASSESSMENT
[FreeTextEntry1] : Long discussion w pt about somatic concerns which are overwhelming her and also impede progress at our visits. I tried to reassure her that many are normal aging, some relate to FBM dx.\par Fine tremor of hands ?med reaction, on many meds, most likely benign essential tremor. she will d/w Dr. Ramon, I will call him as I'm wondering what the skin/mm biopsy is ruling out.\par Needs f/u w Rheum re Fibromyalgia. \par She is managing w her memory loss still doing social outings w other couples etc.\par Contin to keep mind busy w crosswords etc. 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

## 2021-05-04 NOTE — PHYSICAL EXAM
[No Acute Distress] : no acute distress [Well Nourished] : well nourished [Well Developed] : well developed [Well-Appearing] : well-appearing [Normal Sclera/Conjunctiva] : normal sclera/conjunctiva [PERRL] : pupils equal round and reactive to light [EOMI] : extraocular movements intact [Normal Outer Ear/Nose] : the outer ears and nose were normal in appearance [Normal Oropharynx] : the oropharynx was normal [No JVD] : no jugular venous distention [No Lymphadenopathy] : no lymphadenopathy [Supple] : supple [Thyroid Normal, No Nodules] : the thyroid was normal and there were no nodules present [No Respiratory Distress] : no respiratory distress  [No Accessory Muscle Use] : no accessory muscle use [Clear to Auscultation] : lungs were clear to auscultation bilaterally [Normal Rate] : normal rate  [Regular Rhythm] : with a regular rhythm [Normal S1, S2] : normal S1 and S2 [No Murmur] : no murmur heard [No Carotid Bruits] : no carotid bruits [No Abdominal Bruit] : a ~M bruit was not heard ~T in the abdomen [Pedal Pulses Present] : the pedal pulses are present [No Varicosities] : no varicosities [No Edema] : there was no peripheral edema [No Palpable Aorta] : no palpable aorta [No Extremity Clubbing/Cyanosis] : no extremity clubbing/cyanosis [Soft] : abdomen soft [Non Tender] : non-tender [Non-distended] : non-distended [No Masses] : no abdominal mass palpated [No HSM] : no HSM [Normal Bowel Sounds] : normal bowel sounds [Normal Posterior Cervical Nodes] : no posterior cervical lymphadenopathy [Normal Anterior Cervical Nodes] : no anterior cervical lymphadenopathy [No CVA Tenderness] : no CVA  tenderness [No Spinal Tenderness] : no spinal tenderness [No Joint Swelling] : no joint swelling [Grossly Normal Strength/Tone] : grossly normal strength/tone [No Rash] : no rash [Coordination Grossly Intact] : coordination grossly intact [No Focal Deficits] : no focal deficits [Normal Gait] : normal gait [Deep Tendon Reflexes (DTR)] : deep tendon reflexes were 2+ and symmetric [Normal Affect] : the affect was normal [Normal Insight/Judgement] : insight and judgment were intact [de-identified] : fine tremor of hands

## 2021-06-02 ENCOUNTER — APPOINTMENT (OUTPATIENT)
Dept: RHEUMATOLOGY | Facility: CLINIC | Age: 71
End: 2021-06-02
Payer: MEDICARE

## 2021-06-02 VITALS
OXYGEN SATURATION: 98 % | HEIGHT: 63 IN | DIASTOLIC BLOOD PRESSURE: 66 MMHG | BODY MASS INDEX: 20.38 KG/M2 | HEART RATE: 68 BPM | SYSTOLIC BLOOD PRESSURE: 102 MMHG | TEMPERATURE: 97.2 F | WEIGHT: 115 LBS

## 2021-06-02 PROCEDURE — 99213 OFFICE O/P EST LOW 20 MIN: CPT

## 2021-07-14 ENCOUNTER — APPOINTMENT (OUTPATIENT)
Dept: RHEUMATOLOGY | Facility: CLINIC | Age: 71
End: 2021-07-14

## 2021-07-15 ENCOUNTER — APPOINTMENT (OUTPATIENT)
Dept: RHEUMATOLOGY | Facility: CLINIC | Age: 71
End: 2021-07-15
Payer: MEDICARE

## 2021-07-15 VITALS
OXYGEN SATURATION: 95 % | TEMPERATURE: 97.1 F | BODY MASS INDEX: 19.49 KG/M2 | HEART RATE: 77 BPM | WEIGHT: 110 LBS | DIASTOLIC BLOOD PRESSURE: 63 MMHG | SYSTOLIC BLOOD PRESSURE: 100 MMHG | HEIGHT: 63 IN

## 2021-07-15 DIAGNOSIS — D69.6 THROMBOCYTOPENIA, UNSPECIFIED: ICD-10-CM

## 2021-07-15 PROCEDURE — 96401 CHEMO ANTI-NEOPL SQ/IM: CPT

## 2021-07-15 PROCEDURE — 99214 OFFICE O/P EST MOD 30 MIN: CPT | Mod: 25

## 2021-07-26 LAB
25(OH)D3 SERPL-MCNC: 57.8 NG/ML
ALBUMIN SERPL ELPH-MCNC: 4.3 G/DL
ALP BLD-CCNC: 47 U/L
ALT SERPL-CCNC: 18 U/L
ANION GAP SERPL CALC-SCNC: 10 MMOL/L
AST SERPL-CCNC: 27 U/L
BASOPHILS # BLD AUTO: 0.05 K/UL
BASOPHILS NFR BLD AUTO: 0.9 %
BILIRUB SERPL-MCNC: 1.1 MG/DL
BUN SERPL-MCNC: 15 MG/DL
CALCIUM SERPL-MCNC: 9.6 MG/DL
CHLORIDE SERPL-SCNC: 99 MMOL/L
CO2 SERPL-SCNC: 32 MMOL/L
CREAT SERPL-MCNC: 1 MG/DL
EOSINOPHIL # BLD AUTO: 0.14 K/UL
EOSINOPHIL NFR BLD AUTO: 2.5 %
GLUCOSE SERPL-MCNC: 90 MG/DL
HCT VFR BLD CALC: 44.9 %
HGB BLD-MCNC: 14.5 G/DL
IMM GRANULOCYTES NFR BLD AUTO: 0.2 %
LYMPHOCYTES # BLD AUTO: 1.66 K/UL
LYMPHOCYTES NFR BLD AUTO: 29.2 %
MAN DIFF?: NORMAL
MCHC RBC-ENTMCNC: 31.4 PG
MCHC RBC-ENTMCNC: 32.3 GM/DL
MCV RBC AUTO: 97.2 FL
MONOCYTES # BLD AUTO: 0.43 K/UL
MONOCYTES NFR BLD AUTO: 7.6 %
NEUTROPHILS # BLD AUTO: 3.4 K/UL
NEUTROPHILS NFR BLD AUTO: 59.6 %
PLATELET # BLD AUTO: 136 K/UL
POTASSIUM SERPL-SCNC: 4.3 MMOL/L
PROT SERPL-MCNC: 6.1 G/DL
RBC # BLD: 4.62 M/UL
RBC # FLD: 13.3 %
SODIUM SERPL-SCNC: 140 MMOL/L
WBC # FLD AUTO: 5.69 K/UL

## 2021-07-30 ENCOUNTER — TRANSCRIPTION ENCOUNTER (OUTPATIENT)
Age: 71
End: 2021-07-30

## 2021-08-23 ENCOUNTER — OUTPATIENT (OUTPATIENT)
Dept: OUTPATIENT SERVICES | Facility: HOSPITAL | Age: 71
LOS: 1 days | End: 2021-08-23

## 2021-08-24 ENCOUNTER — OUTPATIENT (OUTPATIENT)
Dept: OUTPATIENT SERVICES | Facility: HOSPITAL | Age: 71
LOS: 1 days | End: 2021-08-24
Payer: MEDICARE

## 2021-08-24 PROCEDURE — 72125 CT NECK SPINE W/O DYE: CPT | Mod: 26

## 2021-08-24 PROCEDURE — 70450 CT HEAD/BRAIN W/O DYE: CPT | Mod: 26

## 2021-08-30 ENCOUNTER — TRANSCRIPTION ENCOUNTER (OUTPATIENT)
Age: 71
End: 2021-08-30

## 2021-08-31 ENCOUNTER — APPOINTMENT (OUTPATIENT)
Dept: INTERNAL MEDICINE | Facility: CLINIC | Age: 71
End: 2021-08-31
Payer: MEDICARE

## 2021-08-31 VITALS
SYSTOLIC BLOOD PRESSURE: 110 MMHG | HEIGHT: 63 IN | BODY MASS INDEX: 20.55 KG/M2 | HEART RATE: 87 BPM | WEIGHT: 116 LBS | DIASTOLIC BLOOD PRESSURE: 70 MMHG | TEMPERATURE: 97.3 F

## 2021-08-31 PROCEDURE — 99214 OFFICE O/P EST MOD 30 MIN: CPT

## 2021-08-31 NOTE — HISTORY OF PRESENT ILLNESS
[FreeTextEntry1] : f/u [de-identified] : Here to check in-\par has list but smaller:\par mammo at [facility], [place], didn't get results; sweats/ hot flashes; bruising on arms; hair loss; tremor; post nasal drip/allergies; memory problems.\par Seeing ENT  -   ; on Biotene for dry mouth, Atrovent nasal.\par Dr. Ramon- Neuro- having DaTscan to work up for Parkinsons' ds.; started Trintellix 20 bid now 10 bid for depression, feels it's helping (only partially covered). Temazepam for sleep.\par Yesterday went to [facility] after a yellow jacket bee bit her on tongue (was on the food at cafe). On Pred taper and Claritin.\par \par Going back to school tomorrow,  in [place].\par Feels this will help her stay well.\par Had Covid vax 1/21, 2/21.\par Pneumovax due, prefers to hold off.\par

## 2021-08-31 NOTE — PHYSICAL EXAM
[No Acute Distress] : no acute distress [Well Nourished] : well nourished [Well Developed] : well developed [Well-Appearing] : well-appearing [Normal Sclera/Conjunctiva] : normal sclera/conjunctiva [PERRL] : pupils equal round and reactive to light [EOMI] : extraocular movements intact [Normal Outer Ear/Nose] : the outer ears and nose were normal in appearance [Normal Oropharynx] : the oropharynx was normal [No JVD] : no jugular venous distention [No Lymphadenopathy] : no lymphadenopathy [Supple] : supple [Thyroid Normal, No Nodules] : the thyroid was normal and there were no nodules present [No Respiratory Distress] : no respiratory distress  [No Accessory Muscle Use] : no accessory muscle use [Clear to Auscultation] : lungs were clear to auscultation bilaterally [Normal Rate] : normal rate  [Regular Rhythm] : with a regular rhythm [Normal S1, S2] : normal S1 and S2 [No Murmur] : no murmur heard [No Carotid Bruits] : no carotid bruits [No Abdominal Bruit] : a ~M bruit was not heard ~T in the abdomen [No Varicosities] : no varicosities [Pedal Pulses Present] : the pedal pulses are present [No Edema] : there was no peripheral edema [No Palpable Aorta] : no palpable aorta [No Extremity Clubbing/Cyanosis] : no extremity clubbing/cyanosis [Soft] : abdomen soft [Non Tender] : non-tender [Non-distended] : non-distended [No Masses] : no abdominal mass palpated [No HSM] : no HSM [Normal Bowel Sounds] : normal bowel sounds [Normal Posterior Cervical Nodes] : no posterior cervical lymphadenopathy [Normal Anterior Cervical Nodes] : no anterior cervical lymphadenopathy [No CVA Tenderness] : no CVA  tenderness [No Spinal Tenderness] : no spinal tenderness [No Joint Swelling] : no joint swelling [Grossly Normal Strength/Tone] : grossly normal strength/tone [No Rash] : no rash [Coordination Grossly Intact] : coordination grossly intact [No Focal Deficits] : no focal deficits [Normal Gait] : normal gait [Deep Tendon Reflexes (DTR)] : deep tendon reflexes were 2+ and symmetric [Normal Affect] : the affect was normal [Normal Insight/Judgement] : insight and judgment were intact [de-identified] : small ecchymoses on arms

## 2021-08-31 NOTE — ASSESSMENT
[FreeTextEntry1] : Pt is stable.\par Meds reconciled.\par ?ecchymoses on arms related to Trintellix; not taking aspirin.\par Undergoing w/u for Parkinsons.\par Check mammo from [place].\par Pneumovax due, will hold off until CPE per pt request.\par f/u 11/21 cpe

## 2021-10-26 ENCOUNTER — APPOINTMENT (OUTPATIENT)
Dept: INTERNAL MEDICINE | Facility: CLINIC | Age: 71
End: 2021-10-26
Payer: MEDICARE

## 2021-10-26 VITALS
BODY MASS INDEX: 21.09 KG/M2 | TEMPERATURE: 97 F | HEART RATE: 67 BPM | DIASTOLIC BLOOD PRESSURE: 54 MMHG | SYSTOLIC BLOOD PRESSURE: 105 MMHG | HEIGHT: 63 IN | WEIGHT: 119 LBS | OXYGEN SATURATION: 98 %

## 2021-10-26 DIAGNOSIS — Z23 ENCOUNTER FOR IMMUNIZATION: ICD-10-CM

## 2021-10-26 DIAGNOSIS — I73.00 RAYNAUD'S SYNDROME W/OUT GANGRENE: ICD-10-CM

## 2021-10-26 PROCEDURE — G0009: CPT

## 2021-10-26 PROCEDURE — G0439: CPT

## 2021-10-26 PROCEDURE — 90732 PPSV23 VACC 2 YRS+ SUBQ/IM: CPT

## 2021-10-27 PROBLEM — I73.00 RAYNAUDS DISEASE: Status: ACTIVE | Noted: 2021-02-12

## 2021-10-27 NOTE — ASSESSMENT
[FreeTextEntry1] : Pt as outlined. \par Can send in Nystatin for under breast area.\par Can hold Arisept for now, not helping and she feels causing  side effects.\par Stay on Trintellix until s/w Neuro.\par Refer toBehav health,  names provided. Had seen someone but did not go back ?cost etc.\par f/u 2-3 mos\par

## 2021-10-27 NOTE — PHYSICAL EXAM
[No Acute Distress] : no acute distress [Well Nourished] : well nourished [Well Developed] : well developed [Well-Appearing] : well-appearing [Normal Sclera/Conjunctiva] : normal sclera/conjunctiva [PERRL] : pupils equal round and reactive to light [EOMI] : extraocular movements intact [Normal Outer Ear/Nose] : the outer ears and nose were normal in appearance [Normal Oropharynx] : the oropharynx was normal [No JVD] : no jugular venous distention [No Lymphadenopathy] : no lymphadenopathy [Supple] : supple [Thyroid Normal, No Nodules] : the thyroid was normal and there were no nodules present [No Respiratory Distress] : no respiratory distress  [No Accessory Muscle Use] : no accessory muscle use [Clear to Auscultation] : lungs were clear to auscultation bilaterally [Normal Rate] : normal rate  [Regular Rhythm] : with a regular rhythm [Normal S1, S2] : normal S1 and S2 [No Murmur] : no murmur heard [No Carotid Bruits] : no carotid bruits [No Abdominal Bruit] : a ~M bruit was not heard ~T in the abdomen [No Varicosities] : no varicosities [Pedal Pulses Present] : the pedal pulses are present [No Edema] : there was no peripheral edema [No Palpable Aorta] : no palpable aorta [No Extremity Clubbing/Cyanosis] : no extremity clubbing/cyanosis [Soft] : abdomen soft [Non Tender] : non-tender [Non-distended] : non-distended [No Masses] : no abdominal mass palpated [No HSM] : no HSM [Normal Bowel Sounds] : normal bowel sounds [Normal Posterior Cervical Nodes] : no posterior cervical lymphadenopathy [Normal Anterior Cervical Nodes] : no anterior cervical lymphadenopathy [No CVA Tenderness] : no CVA  tenderness [No Spinal Tenderness] : no spinal tenderness [No Joint Swelling] : no joint swelling [Grossly Normal Strength/Tone] : grossly normal strength/tone [No Rash] : no rash [Coordination Grossly Intact] : coordination grossly intact [No Focal Deficits] : no focal deficits [Normal Gait] : normal gait [Deep Tendon Reflexes (DTR)] : deep tendon reflexes were 2+ and symmetric [Normal Affect] : the affect was normal [Normal Insight/Judgement] : insight and judgment were intact [de-identified] : worried, anxious, depressed [de-identified] : pap eczema upper back; possible candidal rash beneath breasts [de-identified] : subtle fine tremor; gait is normal, facies nl [de-identified] : anxious, trouble w name recall eg which doctor (sees many doctors tho)

## 2021-10-27 NOTE — HEALTH RISK ASSESSMENT
[Good] : ~his/her~ current health as good [Fair] :  ~his/her~ mood as fair [No] : No [No falls in past year] : Patient reported no falls in the past year [1] : 2) Feeling down, depressed, or hopeless for several days (1) [Change in mental status noted] : Change in mental status noted [Patient/Caregiver unclear of wishes] : , patient/caregiver unclear of wishes [Name: ___] : Health Care Proxy's Name: [unfilled]  [Relationship: ___] : Relationship: [unfilled] [] : No [AdvancecareDate] : 10/26/21

## 2021-10-27 NOTE — HISTORY OF PRESENT ILLNESS
[FreeTextEntry1] : cpe [de-identified] : 72 yo woman w memory loss, IBS, FBM, HH, OP, depr/anx, migr, thy nod, t.penia, Raynauds, eczema, hot flashes, palps, tremor, insomnia, hair loss\par Meds: Memantine, Donezpril, Trintellix, Metoprolol, Omepr, Nifed (raynauds), Temazepam, Flexeril, Creon, Prolia, Atrovent nasal spray, \par supps: Ca, D3, Mag, B12, Keranique, Mucinex prn\par list of concerns numerous as previous visits, including tremor which she feels is worse, she's been told it's not PD; hot flashes, memory worsening, rash beneath breasts, rash on upper back; feeling depressed about her memory and about getting older, more aches and pains.\par On Trintllix for depr, written by Neuro dr. Ramon, wonders if making tremors worse; wonders if reacting to Donespril which isn't helping anyway.\par Saw Derm was told form of eczema on back.\par Mammo uptodate 4/21'\par [facility] recent Dr. 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

## 2021-11-29 ENCOUNTER — APPOINTMENT (OUTPATIENT)
Dept: RHEUMATOLOGY | Facility: CLINIC | Age: 71
End: 2021-11-29
Payer: MEDICARE

## 2021-11-29 VITALS
HEIGHT: 63 IN | OXYGEN SATURATION: 97 % | TEMPERATURE: 97.2 F | DIASTOLIC BLOOD PRESSURE: 62 MMHG | WEIGHT: 121 LBS | HEART RATE: 75 BPM | SYSTOLIC BLOOD PRESSURE: 99 MMHG | BODY MASS INDEX: 21.44 KG/M2

## 2021-11-29 PROCEDURE — 99213 OFFICE O/P EST LOW 20 MIN: CPT

## 2022-01-11 ENCOUNTER — NON-APPOINTMENT (OUTPATIENT)
Age: 72
End: 2022-01-11

## 2022-01-11 ENCOUNTER — RX RENEWAL (OUTPATIENT)
Age: 72
End: 2022-01-11

## 2022-02-09 ENCOUNTER — APPOINTMENT (OUTPATIENT)
Dept: RHEUMATOLOGY | Facility: CLINIC | Age: 72
End: 2022-02-09

## 2022-02-09 ENCOUNTER — APPOINTMENT (OUTPATIENT)
Dept: RHEUMATOLOGY | Facility: CLINIC | Age: 72
End: 2022-02-09
Payer: MEDICARE

## 2022-02-09 VITALS
RESPIRATION RATE: 16 BRPM | HEART RATE: 55 BPM | DIASTOLIC BLOOD PRESSURE: 68 MMHG | TEMPERATURE: 97.7 F | SYSTOLIC BLOOD PRESSURE: 100 MMHG

## 2022-02-09 PROCEDURE — 96372 THER/PROPH/DIAG INJ SC/IM: CPT

## 2022-02-09 RX ORDER — DENOSUMAB 60 MG/ML
60 INJECTION SUBCUTANEOUS
Qty: 1 | Refills: 0 | Status: COMPLETED | OUTPATIENT
Start: 2022-02-09

## 2022-02-09 RX ADMIN — DENOSUMAB 0 MG/ML: 60 INJECTION SUBCUTANEOUS at 00:00

## 2022-04-11 ENCOUNTER — APPOINTMENT (OUTPATIENT)
Dept: INTERNAL MEDICINE | Facility: CLINIC | Age: 72
End: 2022-04-11
Payer: MEDICARE

## 2022-04-11 VITALS
HEIGHT: 63 IN | OXYGEN SATURATION: 95 % | SYSTOLIC BLOOD PRESSURE: 105 MMHG | WEIGHT: 126 LBS | BODY MASS INDEX: 22.32 KG/M2 | HEART RATE: 75 BPM | TEMPERATURE: 97.1 F | DIASTOLIC BLOOD PRESSURE: 66 MMHG

## 2022-04-11 PROCEDURE — 99213 OFFICE O/P EST LOW 20 MIN: CPT

## 2022-04-11 RX ORDER — DONEPEZIL HYDROCHLORIDE 5 MG/1
5 TABLET ORAL
Refills: 0 | Status: DISCONTINUED | COMMUNITY
End: 2022-04-11

## 2022-04-11 NOTE — ASSESSMENT
[FreeTextEntry1] : Pt w early memory loss, still working and active socially.\par Considers retiring from math tutoring but this may be a mistake as it keeps her mind busy and also very social.\par Importance/urgency of exercising and following healthy diet  discussed w pt, will stabilize or improve memory.\par Will reach out to Neuro re any trials or new meds she may be eligible for.\par Mammo\par f/u 4 mos

## 2022-04-11 NOTE — HISTORY OF PRESENT ILLNESS
[FreeTextEntry1] : f/u [de-identified] : Stable.\par on Namenda, didn't tolerate Arisept.\par Using Temazepam for sleep, writen by Dr. Ramon(?).  Would like renewal of Flexeril, uses for various aches, pains, msk spasms.\par He's moving, she would like different Neuro.\par Interested in any studies, trials etc. Admits to confabulating and pretending to remember things that partner and friends discuss.\par GI issues, "spastic colon", sees GI, ocmes and goes.\par Still working as , partner wants her to retire but she loves her work and would feel bored.\par Not exercising, has equipment and pool both homes. Gained wt. (still nl wt).\par Covid vax x 4\par Mamm: due\par Prolia for OP

## 2022-04-14 ENCOUNTER — NON-APPOINTMENT (OUTPATIENT)
Age: 72
End: 2022-04-14

## 2022-05-09 ENCOUNTER — RX RENEWAL (OUTPATIENT)
Age: 72
End: 2022-05-09

## 2022-05-10 ENCOUNTER — APPOINTMENT (OUTPATIENT)
Dept: INTERNAL MEDICINE | Facility: CLINIC | Age: 72
End: 2022-05-10

## 2022-07-05 NOTE — ED ADULT TRIAGE NOTE - IDEAL BODY WEIGHT(KG)
New Patient Abstract    Reason for Referral: History of breast cancer    Referring Provider: Natalie Lee DO    Primary Care Provider: Natalie Lee DO    Family History of Cancer/Hematologic Disorders: None reported     Presenting Symptoms: No relevant physical symptoms reported     Narrative with recent with Results/Procedures/Biopsies and Dates completed: Ms. Obinna Hollis is a 15-year-old white female with a history of hypothyroidism, depression/anxiety, fibroids, melanoma in 2012, and breast cancer. She established primary care with Dr. Natalie Lee on 5/18/22 after recently moving to [place] from [place], [place]. She requested a referral to oncology, stating she was seeing them every 6 months to follow up on her history of cancer. Review of records indicates that patient was last seen by Oncologist, Dr. Myranda Burroughs, at [address] [place] [address] on 12/15/21. According to Dr. Uma Schwab notes, patient was diagnosed with a left breast cancer. She had palpated a lump to the left breast about 7 years prior, states that they were just monitoring it, and felt it was nothing to worry about. However, biopsy done on 09/14/2004 indicated tubular carcinoma, measuring up to 1 x 1 cm, ER/OH+, HER-2/conner -. She underwent lumpectomy with adjuvant radiation therapy, but she declined any further treatment with recommended Tamoxifen. In 2006, she again self-palpated a lump to the right breast. A 3.95 mm breast cancer, ER/OH+, HER-2/conner - was noted on pathology. She again underwent lumpectomy with adjuvant radiation therapy and five years of Tamoxifen. Bilateral mammogram done 10/29/2015 noted mild diffuse dense breast parenchyma bilaterally. There was no evidence of any suspicious dominant mass lesion, spiculation and or microcalcifications. The retro-areolar and axillary portions of both breasts were unremarkable.  Patient stated that she had no issues for quite some time, until 2015, when she again self-palpated a lump in her right breast. Lumpectomy was done on 12/14/2018, and pathology noted invasive ductal carcinoma, 0.6 cm, grade 2, ER/ME +, HER2 -. She opted to procced with a skin sparing bilateral mastectomy, which was done 01/21/2016. The left breast noted fibrocystic changes, negative for atypia or malignancy. The right breast noting extensive previous biopsy site and reactive changes, negative for residual invasive carcinoma. Three lymph nodes were removed from the right axilla, and all three nodes were negative for carcinoma. Staging was [id number]. Oncotype DX was not done. BRCA testing was negative. The patient did establish care with [facility] and [address] [place] [address] on 11/11/2016. She was restarted on Tamoxifen, which was changed to [address] in February of 2018, due to menopause status. The patient states that she was on AI therapy for about 3 months, but she was unable to handle the side effects of the medication. She did try cutting the medication dose in half, which was better, but she still opted to discontinue the medication. She does however take a hormone balancing supplement with DIM. PET/CT scan done on 11/30/2016 noted a vague area of hypermetabolism within the right breast subareolar region with subtle soft tissue fullness when compared to the contralateral side; post-surgical changes of bilateral mastectomy, bilateral axillary lymph node dissection, and bilateral breast reconstruction; no suspicious hypermetabolic lymphadenopathy of the head and neck, chest, abdomen or pelvis; and no suspicious hypermetabolic osseous lesion. Bone density scan was performed on 03/14/2018, noting osteopenia. Patient does take daily calcium and vitamin D, but she has declined any bisphosphonate therapy. She was seen by her oncologist with [facility] in October of 2018. They discussed the continuation of AI therapy, along with consideration of bisphosphonate therapy.  The patient was not interested in either of those recommendations and opted not to continue her care with this group. She was looking for a provider with a more holistic approach and was referred to Dr. Jenni Mathew. She was evaluated in consultation by Dr. Jenni Mathew on 4/2/19 to establish care as a new patient due to a history of breast cancer. Patient was interested in moving forward with the possibility of an alternative AI and was prescribed letrozole and exemestene for testing. During follow-up appointment with Dr. Jenni Mathew on 10/16/19, patient opted to proceed with letrozole 2.5mg po daily. She was also referred to a naturopathic oncology provider. Ultimately, patient did not end up starting the letrozole. She was last seen by Dr. Jenni Mathew on 11/17/20. Patient has since relocated from [place] to [place] and is seeking to establish oncology care with [facility] for her history of breast cancer. Notes from Referring Provider: None    Other Pertinent Information: Records requested from [facility]. Presented at Tumor Board:   No 52

## 2022-08-11 ENCOUNTER — APPOINTMENT (OUTPATIENT)
Dept: INTERNAL MEDICINE | Facility: CLINIC | Age: 72
End: 2022-08-11

## 2022-08-11 ENCOUNTER — APPOINTMENT (OUTPATIENT)
Dept: RHEUMATOLOGY | Facility: CLINIC | Age: 72
End: 2022-08-11

## 2022-08-11 VITALS
SYSTOLIC BLOOD PRESSURE: 103 MMHG | HEIGHT: 63 IN | WEIGHT: 122 LBS | BODY MASS INDEX: 21.62 KG/M2 | OXYGEN SATURATION: 97 % | HEART RATE: 74 BPM | TEMPERATURE: 97.3 F | DIASTOLIC BLOOD PRESSURE: 72 MMHG

## 2022-08-11 PROCEDURE — 99214 OFFICE O/P EST MOD 30 MIN: CPT

## 2022-08-11 PROCEDURE — 96372 THER/PROPH/DIAG INJ SC/IM: CPT

## 2022-08-11 NOTE — ASSESSMENT
[FreeTextEntry1] : When I left the room to order labs and then returned, pt stated she forgot the entire encounter.\par I reminded her of our conversation and then she did recall. She is distressed by her memory loss and there is anxiety overlay. \par Will try to expedite appointmt w new neurologist for ? trial.\par We discussed keeping busy, try to read books and do crosswords, word games etc, exercise. \par Routine labs today.\par \par \par

## 2022-08-11 NOTE — HISTORY OF PRESENT ILLNESS
[FreeTextEntry1] : f/u [de-identified] : 71 yo woman w recent dx Dementia, unclear etiol.\par Taking Namenda. Feels the memory loss is worsening. Drove here on her own, uses [employer]. \par Would like new Neurologist for more options. 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 \par She tries to do word games, doesn't really read (books, newspaper) altho she tries. \par Does exercise w weights, walking.\par Had Botox Dr. Nehal Pinon but no skin check.\par IBS-c, uses Miralax.\par She feels she's gained wt (wt is nl, prefers being ~ 116 which is too low).

## 2022-08-11 NOTE — PHYSICAL EXAM
[No Lymphadenopathy] : no lymphadenopathy [Clear to Auscultation] : lungs were clear to auscultation bilaterally [Normal S1, S2] : normal S1 and S2 [No Edema] : there was no peripheral edema [Non Tender] : non-tender [Normal Gait] : normal gait [de-identified] : Anxious

## 2022-08-18 LAB
25(OH)D3 SERPL-MCNC: 71.7 NG/ML
ALBUMIN SERPL ELPH-MCNC: 4.6 G/DL
ALP BLD-CCNC: 59 U/L
ALT SERPL-CCNC: 11 U/L
ANION GAP SERPL CALC-SCNC: 10 MMOL/L
AST SERPL-CCNC: 22 U/L
BASOPHILS # BLD AUTO: 0.05 K/UL
BASOPHILS NFR BLD AUTO: 0.8 %
BILIRUB SERPL-MCNC: 1.2 MG/DL
BUN SERPL-MCNC: 23 MG/DL
CALCIUM SERPL-MCNC: 10.1 MG/DL
CHLORIDE SERPL-SCNC: 96 MMOL/L
CHOLEST SERPL-MCNC: 230 MG/DL
CO2 SERPL-SCNC: 33 MMOL/L
CREAT SERPL-MCNC: 1 MG/DL
EGFR: 60 ML/MIN/1.73M2
EOSINOPHIL # BLD AUTO: 0.12 K/UL
EOSINOPHIL NFR BLD AUTO: 1.8 %
ESTIMATED AVERAGE GLUCOSE: 111 MG/DL
GLUCOSE SERPL-MCNC: 86 MG/DL
HBA1C MFR BLD HPLC: 5.5 %
HCT VFR BLD CALC: 45.1 %
HDLC SERPL-MCNC: 111 MG/DL
HGB BLD-MCNC: 14.8 G/DL
IMM GRANULOCYTES NFR BLD AUTO: 0.2 %
LDLC SERPL CALC-MCNC: 103 MG/DL
LYMPHOCYTES # BLD AUTO: 1.83 K/UL
LYMPHOCYTES NFR BLD AUTO: 27.6 %
MAN DIFF?: NORMAL
MCHC RBC-ENTMCNC: 30.1 PG
MCHC RBC-ENTMCNC: 32.8 GM/DL
MCV RBC AUTO: 91.7 FL
MONOCYTES # BLD AUTO: 0.42 K/UL
MONOCYTES NFR BLD AUTO: 6.3 %
NEUTROPHILS # BLD AUTO: 4.21 K/UL
NEUTROPHILS NFR BLD AUTO: 63.3 %
NONHDLC SERPL-MCNC: 119 MG/DL
PLATELET # BLD AUTO: 151 K/UL
POTASSIUM SERPL-SCNC: 3.3 MMOL/L
PROT SERPL-MCNC: 7.1 G/DL
RBC # BLD: 4.92 M/UL
RBC # FLD: 13.6 %
SODIUM SERPL-SCNC: 140 MMOL/L
TRIGL SERPL-MCNC: 81 MG/DL
TSH SERPL-ACNC: 2.22 UIU/ML
VIT B12 SERPL-MCNC: 1849 PG/ML
WBC # FLD AUTO: 6.64 K/UL

## 2022-08-26 ENCOUNTER — OUTPATIENT (OUTPATIENT)
Dept: OUTPATIENT SERVICES | Facility: HOSPITAL | Age: 72
LOS: 1 days | End: 2022-08-26

## 2022-08-26 DIAGNOSIS — H53.15 VISUAL DISTORTIONS OF SHAPE AND SIZE: ICD-10-CM

## 2022-10-10 ENCOUNTER — RX RENEWAL (OUTPATIENT)
Age: 72
End: 2022-10-10

## 2022-10-20 NOTE — ED ADULT NURSE NOTE - BREATHING, MLM
Spontaneous, unlabored and symmetrical
Dr. Garcia's office will call you to schedule an appointment.  His office number is [phone]

## 2022-10-27 ENCOUNTER — APPOINTMENT (OUTPATIENT)
Dept: INTERNAL MEDICINE | Facility: CLINIC | Age: 72
End: 2022-10-27

## 2022-10-27 VITALS
DIASTOLIC BLOOD PRESSURE: 64 MMHG | WEIGHT: 124 LBS | HEIGHT: 63 IN | SYSTOLIC BLOOD PRESSURE: 97 MMHG | TEMPERATURE: 97.6 F | BODY MASS INDEX: 21.97 KG/M2 | HEART RATE: 89 BPM | OXYGEN SATURATION: 97 %

## 2022-10-27 DIAGNOSIS — M25.473 EFFUSION, UNSPECIFIED ANKLE: ICD-10-CM

## 2022-10-27 PROCEDURE — 99214 OFFICE O/P EST MOD 30 MIN: CPT

## 2022-10-27 NOTE — ASSESSMENT
[FreeTextEntry1] : Dementia is progressing.\par Changes noted on today's visit- repeated her stories to me, (about forgetting encounter w friends)\par  also completely forgot everything we discussed within a few min - I tested her on what we just went over, we wrote it all out.\par Seeing Dr. Epstein, MRI pending, I will call . She'd like a new Neuro (has had many), will refer for ?trial.\par Pedal edema ?med side eff eg Nifedipine=stop Nifed, Metopr, Chlorthal, maybe needs to d/c other meds. \par Pt to bring in what she's taking next visiyt.\par start Lasix half tab 3x/wk, written out for pt\par Refe to Vasc\par Neuro\par Psych- names/#s written out, may help with the distress.\par She'd like me to call her dgtr Matteo Mata [phone]\par

## 2022-10-27 NOTE — HISTORY OF PRESENT ILLNESS
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 She did have wine which usually doesn't do.\par    -She did tell me the story twice, didn't recall having just told me (this is new).\par \mark Saw Dr. Epstein yesterday, new MRI pending- she's on Namenda and Rivastigmine (cholinesterase inhib). Doesn't feel they are working.\par Lower legs/feet puffy, saw Cardiol, he told her to d/c Nifedipine but I think she's still on it- not sure what she is/is not taking.\mark Wears compression stockings at night, not helping. \par No travel.\par c/o tremor of hands, makes putting on makeup harder, now she does it every few dd, leaves it on.\mark Lives alone, partner stays in [facility] and she goes on wkds, during wk she works and sees dgtr/grandkids in [facility]. Able to drive using [employer]. Hasn't gotten lost. Feels he is in denial and should be taking care of her. he wants her there but she has nothing to do there.\mark Likes being near her kids, doing her part time job, seeing friends.\par Not depressed but distressed and feels "I am leaving this earth soon".\par Hasn't seen psychiatrist or psychologist in past.\par \mark Had her flu shot.

## 2022-10-27 NOTE — PHYSICAL EXAM
[No Acute Distress] : no acute distress [Clear to Auscultation] : lungs were clear to auscultation bilaterally [Normal S1, S2] : normal S1 and S2 [de-identified] : short -term memory loss, repeated stories; was unable to recall our conversation within 5 min

## 2022-10-31 ENCOUNTER — NON-APPOINTMENT (OUTPATIENT)
Age: 72
End: 2022-10-31

## 2022-10-31 ENCOUNTER — APPOINTMENT (OUTPATIENT)
Dept: NEUROLOGY | Facility: CLINIC | Age: 72
End: 2022-10-31

## 2022-11-01 ENCOUNTER — NON-APPOINTMENT (OUTPATIENT)
Age: 72
End: 2022-11-01

## 2022-11-03 ENCOUNTER — NON-APPOINTMENT (OUTPATIENT)
Age: 72
End: 2022-11-03

## 2022-12-19 ENCOUNTER — APPOINTMENT (OUTPATIENT)
Dept: NEUROLOGY | Facility: CLINIC | Age: 72
End: 2022-12-19

## 2022-12-19 VITALS — HEART RATE: 101 BPM | SYSTOLIC BLOOD PRESSURE: 123 MMHG | DIASTOLIC BLOOD PRESSURE: 76 MMHG

## 2022-12-19 PROCEDURE — 99215 OFFICE O/P EST HI 40 MIN: CPT

## 2022-12-19 RX ORDER — NIFEDIPINE 30 MG/1
30 TABLET, EXTENDED RELEASE ORAL DAILY
Qty: 90 | Refills: 3 | Status: DISCONTINUED | COMMUNITY
Start: 2021-02-12 | End: 2022-12-19

## 2022-12-19 RX ORDER — METOPROLOL TARTRATE 25 MG/1
25 TABLET, FILM COATED ORAL
Refills: 0 | Status: DISCONTINUED | COMMUNITY
Start: 2021-02-12 | End: 2022-12-19

## 2022-12-19 RX ORDER — DENOSUMAB 60 MG/ML
60 INJECTION SUBCUTANEOUS
Qty: 6 | Refills: 0 | Status: DISCONTINUED | COMMUNITY
Start: 2021-03-22 | End: 2022-12-19

## 2022-12-19 NOTE — PHYSICAL EXAM
[General Appearance - Alert] : alert [General Appearance - In No Acute Distress] : in no acute distress [Oriented To Time, Place, And Person] : oriented to person, place, and time [Impaired Insight] : insight and judgment were intact [Affect] : the affect was normal [Person] : oriented to person [Place] : oriented to place [Time] : oriented to time [Remote Intact] : remote memory intact [Registration Intact] : recent registration memory intact [Concentration Intact] : normal concentrating ability [Visual Intact] : visual attention was ~T not ~L decreased [Naming Objects] : no difficulty naming common objects [Repeating Phrases] : no difficulty repeating a phrase [Writing A Sentence] : no difficulty writing a sentence [Fluency] : fluency intact [Comprehension] : comprehension intact [Reading] : reading intact [Current Events] : adequate knowledge of current events [Past History] : adequate knowledge of personal past history [Vocabulary] : adequate range of vocabulary [Total Score ___ / 30] : the patient achieved a score of [unfilled] /30 [Date / Time ___ / 5] : date / time [unfilled] / 5 [Place ___ / 5] : place [unfilled] / 5 [Registration ___ / 3] : registration [unfilled] / 3 [Serial Sevens ___/5] : serial sevens [unfilled] / 5 [Naming 2 Objects ___ / 2] : naming two objects [unfilled] / 2 [Repeating a Sentence ___ / 1] : repeating a sentence [unfilled] / 1 [Writing a Sentence ___ / 1] : write sentence [unfilled] / 1 [3-stage Verbal Command ___ / 3] : three-stage verbal command [unfilled] / 3 [Written Command ___ / 1] : written command [unfilled] / 1 [Copy a Design ___ / 1] : copy a design [unfilled] / 1 [Recall ___ / 3] : recall [unfilled] / 3 [Cranial Nerves Optic (II)] : visual acuity intact bilaterally,  visual fields full to confrontation, pupils equal round and reactive to light [Cranial Nerves Oculomotor (III)] : extraocular motion intact [Cranial Nerves Trigeminal (V)] : facial sensation intact symmetrically [Cranial Nerves Facial (VII)] : face symmetrical [Cranial Nerves Vestibulocochlear (VIII)] : hearing was intact bilaterally [Cranial Nerves Glossopharyngeal (IX)] : tongue and palate midline [Cranial Nerves Accessory (XI - Cranial And Spinal)] : head turning and shoulder shrug symmetric [Cranial Nerves Hypoglossal (XII)] : there was no tongue deviation with protrusion [Motor Tone] : muscle tone was normal in all four extremities [Motor Strength] : muscle strength was normal in all four extremities [Involuntary Movements] : no involuntary movements were seen [No Muscle Atrophy] : normal bulk in all four extremities [Motor Handedness Right-Handed] : the patient is right hand dominant [Sensation Tactile Decrease] : light touch was intact [Balance] : balance was intact [2+] : Brachioradialis left 2+ [1+] : Ankle jerk left 1+ [Sclera] : the sclera and conjunctiva were normal [PERRL With Normal Accommodation] : pupils were equal in size, round, reactive to light, with normal accommodation [Extraocular Movements] : extraocular movements were intact [No APD] : no afferent pupillary defect [No LAMBERTO] : no internuclear ophthalmoplegia [Full Visual Field] : full visual field [Outer Ear] : the ears and nose were normal in appearance [Neck Appearance] : the appearance of the neck was normal [Edema] : there was no peripheral edema [Abnormal Walk] : normal gait [] : no rash [Short Term Intact] : short term memory impaired [Motor Strength Upper Extremities Bilaterally] : strength was normal in both upper extremities [Motor Strength Lower Extremities Bilaterally] : strength was normal in both lower extremities [Romberg's Sign] : Romberg's sign was negtive [Past-pointing] : there was no past-pointing [Tremor] : no tremor present [Plantar Reflex Right Only] : normal on the right [Plantar Reflex Left Only] : normal on the left [FreeTextEntry4] : Mental Status Exam\par Presidents: 3/5\par Alternating Pattern: ok\par Spiral: ok\par Clock: 3/3\par Repetition: ok\par \par Trail A: B:\par Fluency: A: 13; Animals: 4\par \par Go-No-Go: ok\par \par R/L discrimination on self and examiner: ok\par Cross-line commands: ok\par Praxis:\par -Motor: ok\par -Dynamic/Luria: ok\par -Ideomotor/Imitation: ok\par -Ideational/writing/closing-in: ok\par -Dressing: ok. [FreeTextEntry9] : Normal gait, march, pull and pivot. ? reduced swing LUE.

## 2022-12-19 NOTE — REASON FOR VISIT
[Initial Evaluation] : an initial evaluation [Family Member] : family member [FreeTextEntry1] : memory loss.

## 2022-12-19 NOTE — ASSESSMENT
[FreeTextEntry1] : Assessment:\par 71yo RH WW with ongoing STM issues, mostly difficulty with names, recent events and conversations. \par MS exam is otherwise ok.\par Motor exam ok.\par Not impressed with the testing, and ? evidence of neurodegenerative disease, from prior reported imaging, including FDG-PET (? LBD) and AVTAR (negative).\par On CTh there is a bit of FT atrophy, not striking per age.\par Slow processing and getting flustered. \par \par Diagnostic Impression:\par -forgetfulness NOS\par \par Plan:\par -obtain and review CDs of imaging from [facility] (mostly PET 2020)\par -repeat FDG-PET\par -basic and inflammatory labs. \par \par A thorough discussion was entertained with the patient/caregiver regarding the use of psychoactive medications, their possible benefits and AE profile, including the risk of cardiovascular complications, including but not limited to applicable black box warning and teratogenicity, where appropriate.\par We discussed the benefits of being active, physically and mentally, and the need to to establish a routine in this respect.\par Driving abilities and firearms possession and use were discussed, in relation to progression of the cognitive decline, and the need to assess them periodically.\par Patient/caregiver advised to bring previous records to this office.\par All questions were answered at the time of the visit. We are certainly available for further discussion as needed.\par Patient/caregiver fully understands and agree with the plan.\par

## 2022-12-19 NOTE — HISTORY OF PRESENT ILLNESS
[FreeTextEntry1] : NO COVID.\par COVID VACCINE FULL.\par \par HPI: 73yo RH WW with HTN (not on meds anymore), here for concerns of memory loss. \par Spine Fx from a fall off the bed 2018 or so. ? due to osteoporosis? No Sx.\par \par PMH:\par pt reporting her own issues.\par a few years ago, she started to have STM issues, forgetting names, tasks etc.\par In the last 3 months, she had difficulties with faces, recognizing people. 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 \par \par -Memory: recent events, names\par -Speech: may have some issues producing sentences, some WFD, seems to be improved now by spending more time with people\par -Orientation: usually ok, not getting lost, but at times hesitates\par -Praxis: ok\par -Decision making/Executive fx/Multitasking: ok, set schedule\par \par -Sleep: some insomnia, ok with Trazodone\par \par -Appetite: ok, stable, very controlled diet; poor BF and lunch, but eats a bit better at dinner\par \par -Motor symptoms: ok\par \par -B/B: urinary frequency, more so at night\par \par -Psychiatric symptoms: some anxiety, some worries on her health (used to take GBP, Prednisone, Amitriptyline, mirtazapine in the past)\par \par -Functional status:\par [place] Index of [place] in Activities of Daily Living:\par 1. Bathing/Showerin\par 2. Dressin\par 3. Toiletin\par 4. Transferrin\par 5. Continence: 1\par 6: Feedin\par \par TOTAL: 6\par \par [place] Instrumental Activities of Daily Living:\par A. Ability to Use Telephone: 1\par B. Shoppin\par C. Food Preparation: 1\par D. Housekeepin\par E. Laundry: 1\par F. Transportation: 1\par G. Responsibility for Own Medications: 1\par H: Ability to Handle Finances: 1\par \par TOTAL: 8\par \par CDR: 0.5\par \par -Professional status: \par \par PCP and other physicians:\par -PCP: SUSAN CRUZ\par -Neuro: Haimovic\par \par Workup done:\par -CTh \par -MRI BRAIN  ([facility] - no report)\par -NPT (Haimovic - ???)\par -FDG-PET  ([facility] - LBD?).

## 2022-12-21 ENCOUNTER — APPOINTMENT (OUTPATIENT)
Dept: INTERNAL MEDICINE | Facility: CLINIC | Age: 72
End: 2022-12-21

## 2022-12-23 ENCOUNTER — NON-APPOINTMENT (OUTPATIENT)
Age: 72
End: 2022-12-23

## 2023-01-03 ENCOUNTER — APPOINTMENT (OUTPATIENT)
Dept: INTERNAL MEDICINE | Facility: CLINIC | Age: 73
End: 2023-01-03

## 2023-01-03 ENCOUNTER — APPOINTMENT (OUTPATIENT)
Dept: INTERNAL MEDICINE | Facility: CLINIC | Age: 73
End: 2023-01-03
Payer: MEDICARE

## 2023-01-03 VITALS
OXYGEN SATURATION: 98 % | BODY MASS INDEX: 22.32 KG/M2 | WEIGHT: 126 LBS | HEART RATE: 86 BPM | TEMPERATURE: 97.3 F | HEIGHT: 63 IN | SYSTOLIC BLOOD PRESSURE: 116 MMHG | DIASTOLIC BLOOD PRESSURE: 70 MMHG

## 2023-01-03 PROCEDURE — 99214 OFFICE O/P EST MOD 30 MIN: CPT

## 2023-01-03 RX ORDER — NITROGLYCERIN 20 MG/G
2 OINTMENT TOPICAL
Qty: 1 | Refills: 1 | Status: DISCONTINUED | COMMUNITY
Start: 2021-03-22 | End: 2023-01-03

## 2023-01-03 RX ORDER — ADHESIVE TAPE 3"X 2.3 YD
50 MCG TAPE, NON-MEDICATED TOPICAL
Refills: 0 | Status: DISCONTINUED | COMMUNITY
End: 2023-01-03

## 2023-01-03 RX ORDER — TRAZODONE HYDROCHLORIDE 50 MG/1
50 TABLET ORAL AT BEDTIME
Refills: 0 | Status: DISCONTINUED | COMMUNITY
Start: 2022-12-19 | End: 2023-01-03

## 2023-01-03 NOTE — ASSESSMENT
[FreeTextEntry1] : Pt as outlined. Pt  did forget things we discussed during course of the visit and required eg.  We discussed that Prolia due in Feb, (she did not recall being on this twice a yr); it was  written down for pt, then she  asked when is Prolia due? This occurred on quite a few topics today.\par Lesions upper arms ?purpuric- she does have low plts, will check labs today.\par Not on aspirin, meds reconciled. On minimal meds.\par She has declined/forgotten to set up a Psych appointmt, will hold off for now. (Names wrtten out last time, dgtr aware, possible need to pay out of pocket had been discussed).\par P: needs Neuro f/u and PET scan\par Dexa ordered\par Rheum next mo for Prolia\par routine labs now.\par \par f/u 3 mos

## 2023-01-03 NOTE — HISTORY OF PRESENT ILLNESS
[FreeTextEntry1] : f/u [de-identified] : 73 yo woman w memory loss, OP, IBS, anxiety, t.penia, thy nod, Raynauds, eczema, FBM hair loss\par Rheum Dr. Ronald Edmond for Prolia, due next mo; Dexa is due (last one found was '19 Zwanger t score -2.1 hip improved)\par Saw Derm Dr. Calvert for hair loss, treating w Nexus shampoo not helping\par Meds reconciled- was taking Oscal-D twice a day can take once a day; Mag can d/c\par Says she feels better having d/cd some unnecessary meds we discussed last visit.\par Saw Dr. Chung, due for f/u and PET scan. Records from [facility] testing 11/22/in chart, c/w [facility] so far.\par Flu shot and covid booster 10/22\par c/o spots on arms; c/o hair loss; c/o difficulty getting from sitting to standing up since her accident- fall w pelvic fx 12/19.\par She does exercise a bit.\par

## 2023-01-03 NOTE — PHYSICAL EXAM
[No Acute Distress] : no acute distress [Well Nourished] : well nourished [Well Developed] : well developed [Well-Appearing] : well-appearing [Normal Sclera/Conjunctiva] : normal sclera/conjunctiva [PERRL] : pupils equal round and reactive to light [EOMI] : extraocular movements intact [Normal Outer Ear/Nose] : the outer ears and nose were normal in appearance [Normal Oropharynx] : the oropharynx was normal [No JVD] : no jugular venous distention [No Lymphadenopathy] : no lymphadenopathy [Supple] : supple [Thyroid Normal, No Nodules] : the thyroid was normal and there were no nodules present [No Respiratory Distress] : no respiratory distress  [No Accessory Muscle Use] : no accessory muscle use [Clear to Auscultation] : lungs were clear to auscultation bilaterally [Normal Rate] : normal rate  [Regular Rhythm] : with a regular rhythm [Normal S1, S2] : normal S1 and S2 [No Murmur] : no murmur heard [No Carotid Bruits] : no carotid bruits [No Abdominal Bruit] : a ~M bruit was not heard ~T in the abdomen [No Varicosities] : no varicosities [Pedal Pulses Present] : the pedal pulses are present [No Edema] : there was no peripheral edema [No Palpable Aorta] : no palpable aorta [No Extremity Clubbing/Cyanosis] : no extremity clubbing/cyanosis [Soft] : abdomen soft [Non Tender] : non-tender [Non-distended] : non-distended [No Masses] : no abdominal mass palpated [No HSM] : no HSM [Normal Bowel Sounds] : normal bowel sounds [Normal Posterior Cervical Nodes] : no posterior cervical lymphadenopathy [Normal Anterior Cervical Nodes] : no anterior cervical lymphadenopathy [No CVA Tenderness] : no CVA  tenderness [No Spinal Tenderness] : no spinal tenderness [No Joint Swelling] : no joint swelling [Grossly Normal Strength/Tone] : grossly normal strength/tone [No Rash] : no rash [Coordination Grossly Intact] : coordination grossly intact [No Focal Deficits] : no focal deficits [Normal Gait] : normal gait [Deep Tendon Reflexes (DTR)] : deep tendon reflexes were 2+ and symmetric [Normal Affect] : the affect was normal [Normal Insight/Judgement] : insight and judgment were intact [de-identified] : bilat upper arms  2-3 purplish round lesions ?purpura [de-identified] : gait is good, gets up from chair pretty easily; [de-identified] : easiy forgetful during conversation requires repetition and writing down.

## 2023-01-06 LAB
ALBUMIN SERPL ELPH-MCNC: 4.3 G/DL
ALP BLD-CCNC: 60 U/L
ALT SERPL-CCNC: 11 U/L
ANION GAP SERPL CALC-SCNC: 9 MMOL/L
AST SERPL-CCNC: 22 U/L
BASOPHILS # BLD AUTO: 0.03 K/UL
BASOPHILS NFR BLD AUTO: 0.5 %
BILIRUB SERPL-MCNC: 0.9 MG/DL
BUN SERPL-MCNC: 14 MG/DL
CALCIUM SERPL-MCNC: 9.1 MG/DL
CHLORIDE SERPL-SCNC: 101 MMOL/L
CO2 SERPL-SCNC: 30 MMOL/L
CREAT SERPL-MCNC: 1 MG/DL
EGFR: 60 ML/MIN/1.73M2
EOSINOPHIL # BLD AUTO: 0.19 K/UL
EOSINOPHIL NFR BLD AUTO: 3 %
GLUCOSE SERPL-MCNC: 110 MG/DL
HCT VFR BLD CALC: 42.2 %
HGB BLD-MCNC: 13.5 G/DL
IMM GRANULOCYTES NFR BLD AUTO: 0.3 %
LYMPHOCYTES # BLD AUTO: 1.71 K/UL
LYMPHOCYTES NFR BLD AUTO: 27 %
MAN DIFF?: NORMAL
MCHC RBC-ENTMCNC: 30.1 PG
MCHC RBC-ENTMCNC: 32 GM/DL
MCV RBC AUTO: 94 FL
MONOCYTES # BLD AUTO: 0.36 K/UL
MONOCYTES NFR BLD AUTO: 5.7 %
NEUTROPHILS # BLD AUTO: 4.02 K/UL
NEUTROPHILS NFR BLD AUTO: 63.5 %
PLATELET # BLD AUTO: 121 K/UL
POTASSIUM SERPL-SCNC: 4.2 MMOL/L
PROT SERPL-MCNC: 6.4 G/DL
RBC # BLD: 4.49 M/UL
RBC # FLD: 13.5 %
SODIUM SERPL-SCNC: 140 MMOL/L
TSH SERPL-ACNC: 2.31 UIU/ML
WBC # FLD AUTO: 6.33 K/UL

## 2023-01-16 ENCOUNTER — NON-APPOINTMENT (OUTPATIENT)
Age: 73
End: 2023-01-16

## 2023-02-08 ENCOUNTER — APPOINTMENT (OUTPATIENT)
Dept: NEUROLOGY | Facility: CLINIC | Age: 73
End: 2023-02-08

## 2023-02-16 ENCOUNTER — APPOINTMENT (OUTPATIENT)
Dept: RHEUMATOLOGY | Facility: CLINIC | Age: 73
End: 2023-02-16
Payer: MEDICARE

## 2023-02-16 VITALS
BODY MASS INDEX: 21.79 KG/M2 | OXYGEN SATURATION: 98 % | WEIGHT: 123 LBS | DIASTOLIC BLOOD PRESSURE: 76 MMHG | SYSTOLIC BLOOD PRESSURE: 126 MMHG | HEART RATE: 81 BPM | TEMPERATURE: 97.2 F | HEIGHT: 63 IN

## 2023-02-16 PROCEDURE — 96372 THER/PROPH/DIAG INJ SC/IM: CPT

## 2023-02-17 ENCOUNTER — APPOINTMENT (OUTPATIENT)
Dept: RADIOLOGY | Facility: CLINIC | Age: 73
End: 2023-02-17
Payer: MEDICARE

## 2023-02-17 ENCOUNTER — OUTPATIENT (OUTPATIENT)
Dept: OUTPATIENT SERVICES | Facility: HOSPITAL | Age: 73
LOS: 1 days | End: 2023-02-17
Payer: MEDICARE

## 2023-02-17 DIAGNOSIS — M81.0 AGE-RELATED OSTEOPOROSIS WITHOUT CURRENT PATHOLOGICAL FRACTURE: ICD-10-CM

## 2023-02-17 PROCEDURE — 77080 DXA BONE DENSITY AXIAL: CPT

## 2023-02-17 PROCEDURE — 77080 DXA BONE DENSITY AXIAL: CPT | Mod: 26

## 2023-02-17 RX ADMIN — DENOSUMAB 0 MG/ML: 60 INJECTION SUBCUTANEOUS at 00:00

## 2023-02-22 RX ORDER — DENOSUMAB 60 MG/ML
60 INJECTION SUBCUTANEOUS
Qty: 1 | Refills: 0 | Status: COMPLETED | OUTPATIENT
Start: 2023-02-17

## 2023-03-14 ENCOUNTER — APPOINTMENT (OUTPATIENT)
Dept: INTERNAL MEDICINE | Facility: CLINIC | Age: 73
End: 2023-03-14
Payer: MEDICARE

## 2023-03-14 VITALS
OXYGEN SATURATION: 97 % | HEIGHT: 63 IN | TEMPERATURE: 97.2 F | DIASTOLIC BLOOD PRESSURE: 68 MMHG | SYSTOLIC BLOOD PRESSURE: 110 MMHG | WEIGHT: 120 LBS | HEART RATE: 84 BPM | BODY MASS INDEX: 21.26 KG/M2

## 2023-03-14 PROCEDURE — 99213 OFFICE O/P EST LOW 20 MIN: CPT

## 2023-03-14 NOTE — ASSESSMENT
[FreeTextEntry1] : Pt as outlined-\par worsening dementia, w anxiety component. keeps losing my psych referrals, my computer is down will let her know when it's back up.\par I don't advise d/c Trazdone since it's helping her sleep, without sleep she'd be worse.\par Check K+ today.\par Mammo next mo\par Neuro next mo\par Family needs plan going forward- [place] leaving her alone (she says he cleans the house and watches TV). They socialize sometimes here, sometimes out there.\par f/u 3 mos

## 2023-03-14 NOTE — HISTORY OF PRESENT ILLNESS
[FreeTextEntry1] : f/u\par \par pt was late again and accomodated- was supposed to have physical, will address any issues and concerns. [de-identified] : She feels her dementia/memory is worsening. Names, conversations, what she just ate, where she parked. Writes everything down. Drives via [employer]par Seeing Neuro Dr. ROBINS next mo.\par Did not make Psych appointmt, we discussed this many times, anxiety is overlaying the memory issues.\par She seems to have lost the names I gave her. \par She uses Trazadone 50 mg (Tete wrote) but worries about it making memory worse-however without sleep she would also get worse.\par -She has concerns about hair/nails-->derm\par -Some puffiness of ankles end of day\par -bottom of feet seem red, no pain\par \par Dexa recent, gets Prolia\par Mammo due next mo \par \par GI Dr. Zuñiga - she thinks Reading recent, I did speak w him last mo but it was about her memory troubles.\par Labs 1/23 ok except K 3.3 will repeat\par \par [place], she stays alone during wk, then [place] for the wkds. Dgtr nearby but no definite plans for care as this gets worse.\par

## 2023-03-15 ENCOUNTER — RX RENEWAL (OUTPATIENT)
Age: 73
End: 2023-03-15

## 2023-03-15 LAB
ANION GAP SERPL CALC-SCNC: 9 MMOL/L
BUN SERPL-MCNC: 11 MG/DL
CALCIUM SERPL-MCNC: 9.2 MG/DL
CHLORIDE SERPL-SCNC: 102 MMOL/L
CO2 SERPL-SCNC: 30 MMOL/L
CREAT SERPL-MCNC: 1.03 MG/DL
EGFR: 58 ML/MIN/1.73M2
GLUCOSE SERPL-MCNC: 101 MG/DL
POTASSIUM SERPL-SCNC: 4 MMOL/L
SODIUM SERPL-SCNC: 141 MMOL/L

## 2023-03-22 ENCOUNTER — RX RENEWAL (OUTPATIENT)
Age: 73
End: 2023-03-22

## 2023-03-24 ENCOUNTER — RX RENEWAL (OUTPATIENT)
Age: 73
End: 2023-03-24

## 2023-03-28 ENCOUNTER — APPOINTMENT (OUTPATIENT)
Dept: INTERNAL MEDICINE | Facility: CLINIC | Age: 73
End: 2023-03-28

## 2023-04-03 ENCOUNTER — APPOINTMENT (OUTPATIENT)
Dept: NEUROLOGY | Facility: CLINIC | Age: 73
End: 2023-04-03

## 2023-04-04 ENCOUNTER — APPOINTMENT (OUTPATIENT)
Dept: NUCLEAR MEDICINE | Facility: CLINIC | Age: 73
End: 2023-04-04
Payer: MEDICARE

## 2023-04-04 ENCOUNTER — OUTPATIENT (OUTPATIENT)
Dept: OUTPATIENT SERVICES | Facility: HOSPITAL | Age: 73
LOS: 1 days | End: 2023-04-04
Payer: MEDICARE

## 2023-04-04 ENCOUNTER — RESULT REVIEW (OUTPATIENT)
Age: 73
End: 2023-04-04

## 2023-04-04 DIAGNOSIS — Z00.8 ENCOUNTER FOR OTHER GENERAL EXAMINATION: ICD-10-CM

## 2023-04-04 PROCEDURE — 78608 BRAIN IMAGING (PET): CPT | Mod: 26,MH

## 2023-04-04 PROCEDURE — A9552: CPT

## 2023-04-04 PROCEDURE — 78608 BRAIN IMAGING (PET): CPT

## 2023-04-17 ENCOUNTER — APPOINTMENT (OUTPATIENT)
Dept: MAMMOGRAPHY | Facility: CLINIC | Age: 73
End: 2023-04-17

## 2023-04-25 ENCOUNTER — APPOINTMENT (OUTPATIENT)
Dept: INTERNAL MEDICINE | Facility: CLINIC | Age: 73
End: 2023-04-25

## 2023-05-01 ENCOUNTER — APPOINTMENT (OUTPATIENT)
Dept: INTERNAL MEDICINE | Facility: CLINIC | Age: 73
End: 2023-05-01
Payer: MEDICARE

## 2023-05-01 VITALS
SYSTOLIC BLOOD PRESSURE: 131 MMHG | DIASTOLIC BLOOD PRESSURE: 80 MMHG | TEMPERATURE: 97.3 F | BODY MASS INDEX: 21.97 KG/M2 | OXYGEN SATURATION: 93 % | WEIGHT: 124 LBS | HEART RATE: 94 BPM | HEIGHT: 63 IN

## 2023-05-01 DIAGNOSIS — G43.909 MIGRAINE, UNSPECIFIED, NOT INTRACTABLE, W/OUT STATUS MIGRAINOSUS: ICD-10-CM

## 2023-05-01 DIAGNOSIS — F32.A DEPRESSION, UNSPECIFIED: ICD-10-CM

## 2023-05-01 PROCEDURE — 99213 OFFICE O/P EST LOW 20 MIN: CPT

## 2023-05-01 RX ORDER — CYCLOBENZAPRINE HYDROCHLORIDE 10 MG/1
10 TABLET, FILM COATED ORAL
Qty: 30 | Refills: 0 | Status: DISCONTINUED | COMMUNITY
Start: 2021-03-22 | End: 2023-05-01

## 2023-05-01 NOTE — ASSESSMENT
[FreeTextEntry1] : Pt as outlined.\par Mild early cog decline, doing pretty well.\par Not amenable to psych help re anxiety.\par New neuro names provided.\par contin namenda\par see Derm for nail concerns.\par Mammo script\par d/c flexeril\par \par f/u 3 mos

## 2023-05-01 NOTE — HISTORY OF PRESENT ILLNESS
[FreeTextEntry1] : f/u [de-identified] : Pt is here w list, for concerns:\par would like new neuro again, Dr. Chung can't see her until July..\par She does feel better since stopping many meds, (metop, mirtaz, tramadol, nifedipine..) the only  ones she now takes are Namenda, Ca++ Mag and D. Sleeps well without meds.\par She'd been calling for Flexeril and I want her off this, she's ok being off it.\par Still driving, working, socializing, helping w grandkids.\par lost script for mammo, needs new one.\par Had HA for a few min posterior head, wonders if can be cardiac, it did resolve.\par Allergies this time of year.\par Concerns about nails not being strong, has manicures. Derm= Brisman/Spitz.\par \par

## 2023-05-09 DIAGNOSIS — Z86.59 PERSONAL HISTORY OF OTHER MENTAL AND BEHAVIORAL DISORDERS: ICD-10-CM

## 2023-05-27 ENCOUNTER — NON-APPOINTMENT (OUTPATIENT)
Age: 73
End: 2023-05-27

## 2023-05-30 ENCOUNTER — APPOINTMENT (OUTPATIENT)
Dept: INTERNAL MEDICINE | Facility: CLINIC | Age: 73
End: 2023-05-30
Payer: MEDICARE

## 2023-05-30 DIAGNOSIS — R10.9 UNSPECIFIED ABDOMINAL PAIN: ICD-10-CM

## 2023-05-30 DIAGNOSIS — E04.2 NONTOXIC MULTINODULAR GOITER: ICD-10-CM

## 2023-05-30 DIAGNOSIS — G31.84 MILD COGNITIVE IMPAIRMENT, SO STATED: ICD-10-CM

## 2023-05-30 PROCEDURE — 99442: CPT | Mod: 95

## 2023-06-14 ENCOUNTER — RX RENEWAL (OUTPATIENT)
Age: 73
End: 2023-06-14

## 2023-06-27 ENCOUNTER — RX RENEWAL (OUTPATIENT)
Age: 73
End: 2023-06-27

## 2023-07-13 ENCOUNTER — APPOINTMENT (OUTPATIENT)
Dept: INTERNAL MEDICINE | Facility: CLINIC | Age: 73
End: 2023-07-13
Payer: MEDICARE

## 2023-07-13 VITALS
HEIGHT: 63 IN | SYSTOLIC BLOOD PRESSURE: 117 MMHG | RESPIRATION RATE: 16 BRPM | HEART RATE: 81 BPM | OXYGEN SATURATION: 98 % | DIASTOLIC BLOOD PRESSURE: 72 MMHG | WEIGHT: 121 LBS | BODY MASS INDEX: 21.44 KG/M2

## 2023-07-13 DIAGNOSIS — N64.59 OTHER SIGNS AND SYMPTOMS IN BREAST: ICD-10-CM

## 2023-07-13 PROCEDURE — 99213 OFFICE O/P EST LOW 20 MIN: CPT

## 2023-07-13 NOTE — HISTORY OF PRESENT ILLNESS
[FreeTextEntry1] : f/u [de-identified] : 72 yo woman w early cog decline, IBS-c, OP, insomnia, anxiety\par Has list of concerns:\par -many yrs frequent urination, no burning, no incont, gets up at night to urinate. Does drink a lot of water incl at night w dinner. Not interested in OAB meds.\par -many yrs of left breast discomfort, h/o implants, recent Mammo nl. Cannot recall breast surg would like new names\par -rash in back of scalp, itchy\par -veins in hands\par -brown spots chest-->considering laser\par -trouble sleeping, was on Temazepam in the past now off, what to try \par -tinnitus\par - Flexeril renewal?\par -nails not growing well\par - ongoing eval by Neuro Dr. Dennis\par We discussed flexeril- does not have mm  pain/spasm, I explained we should not use if unnecessary due to concerns about further memory loss. She was fine w this.\par Only med is Namenda and vit D

## 2023-07-13 NOTE — PHYSICAL EXAM
[No Acute Distress] : no acute distress [Well Nourished] : well nourished [Well Developed] : well developed [Well-Appearing] : well-appearing [Normal Sclera/Conjunctiva] : normal sclera/conjunctiva [PERRL] : pupils equal round and reactive to light [EOMI] : extraocular movements intact [Normal Outer Ear/Nose] : the outer ears and nose were normal in appearance [Normal Oropharynx] : the oropharynx was normal [No JVD] : no jugular venous distention [No Lymphadenopathy] : no lymphadenopathy [Supple] : supple [Thyroid Normal, No Nodules] : the thyroid was normal and there were no nodules present [No Respiratory Distress] : no respiratory distress  [No Accessory Muscle Use] : no accessory muscle use [Clear to Auscultation] : lungs were clear to auscultation bilaterally [Normal Rate] : normal rate  [Regular Rhythm] : with a regular rhythm [Normal S1, S2] : normal S1 and S2 [No Murmur] : no murmur heard [No Carotid Bruits] : no carotid bruits [No Abdominal Bruit] : a ~M bruit was not heard ~T in the abdomen [No Varicosities] : no varicosities [Pedal Pulses Present] : the pedal pulses are present [No Edema] : there was no peripheral edema [No Palpable Aorta] : no palpable aorta [No Extremity Clubbing/Cyanosis] : no extremity clubbing/cyanosis [Soft] : abdomen soft [Non Tender] : non-tender [Non-distended] : non-distended [No Masses] : no abdominal mass palpated [No HSM] : no HSM [Normal Bowel Sounds] : normal bowel sounds [Normal Posterior Cervical Nodes] : no posterior cervical lymphadenopathy [Normal Anterior Cervical Nodes] : no anterior cervical lymphadenopathy [No CVA Tenderness] : no CVA  tenderness [No Spinal Tenderness] : no spinal tenderness [No Joint Swelling] : no joint swelling [Grossly Normal Strength/Tone] : grossly normal strength/tone [No Rash] : no rash [Coordination Grossly Intact] : coordination grossly intact [No Focal Deficits] : no focal deficits [Normal Gait] : normal gait [Deep Tendon Reflexes (DTR)] : deep tendon reflexes were 2+ and symmetric [Normal Affect] : the affect was normal [Normal Insight/Judgement] : insight and judgment were intact [de-identified] : age spots chest; ?dry eczematous patch post scalp

## 2023-07-13 NOTE — ASSESSMENT
[FreeTextEntry1] : Pt as outlined.\par Can stay off Flexeril since not needed.\par Can use B vits/Nutrafol for hair/nails.\par Can try Melatonin 2 mg qhs\par Limit water intake after 6 pm. \par derm for age spots on chest and fillers requested in liupper lip area Dr. Calvert\par Clobetasol for scalp rash\par Breast MD referral for ongoing breast discomfort, mammo nl.\par

## 2023-07-24 ENCOUNTER — APPOINTMENT (OUTPATIENT)
Dept: NEUROLOGY | Facility: CLINIC | Age: 73
End: 2023-07-24

## 2023-08-10 ENCOUNTER — APPOINTMENT (OUTPATIENT)
Dept: INTERNAL MEDICINE | Facility: CLINIC | Age: 73
End: 2023-08-10

## 2023-08-15 ENCOUNTER — APPOINTMENT (OUTPATIENT)
Dept: RHEUMATOLOGY | Facility: CLINIC | Age: 73
End: 2023-08-15
Payer: MEDICARE

## 2023-08-15 VITALS
SYSTOLIC BLOOD PRESSURE: 120 MMHG | DIASTOLIC BLOOD PRESSURE: 79 MMHG | RESPIRATION RATE: 16 BRPM | OXYGEN SATURATION: 97 % | HEART RATE: 71 BPM | TEMPERATURE: 97.4 F

## 2023-08-15 PROCEDURE — 96372 THER/PROPH/DIAG INJ SC/IM: CPT

## 2023-08-15 RX ADMIN — DENOSUMAB 0 MG/ML: 60 INJECTION SUBCUTANEOUS at 00:00

## 2023-08-17 RX ORDER — DENOSUMAB 60 MG/ML
60 INJECTION SUBCUTANEOUS
Qty: 1 | Refills: 0 | Status: COMPLETED | OUTPATIENT
Start: 2023-08-15

## 2023-09-13 ENCOUNTER — APPOINTMENT (OUTPATIENT)
Dept: RHEUMATOLOGY | Facility: CLINIC | Age: 73
End: 2023-09-13
Payer: MEDICARE

## 2023-09-13 VITALS
OXYGEN SATURATION: 97 % | DIASTOLIC BLOOD PRESSURE: 69 MMHG | HEIGHT: 63 IN | SYSTOLIC BLOOD PRESSURE: 115 MMHG | WEIGHT: 121 LBS | HEART RATE: 81 BPM | BODY MASS INDEX: 21.44 KG/M2

## 2023-09-13 PROCEDURE — 99214 OFFICE O/P EST MOD 30 MIN: CPT

## 2023-09-14 ENCOUNTER — APPOINTMENT (OUTPATIENT)
Dept: PLASTIC SURGERY | Facility: CLINIC | Age: 73
End: 2023-09-14

## 2023-09-14 LAB
25(OH)D3 SERPL-MCNC: 76.2 NG/ML
ALBUMIN SERPL ELPH-MCNC: 4.4 G/DL
ALP BLD-CCNC: 79 U/L
ALT SERPL-CCNC: 10 U/L
ANION GAP SERPL CALC-SCNC: 12 MMOL/L
AST SERPL-CCNC: 22 U/L
BASOPHILS # BLD AUTO: 0.03 K/UL
BASOPHILS NFR BLD AUTO: 0.5 %
BILIRUB SERPL-MCNC: 0.9 MG/DL
BUN SERPL-MCNC: 10 MG/DL
CALCIUM SERPL-MCNC: 9.6 MG/DL
CHLORIDE SERPL-SCNC: 100 MMOL/L
CO2 SERPL-SCNC: 28 MMOL/L
CREAT SERPL-MCNC: 0.93 MG/DL
EGFR: 65 ML/MIN/1.73M2
EOSINOPHIL # BLD AUTO: 0.14 K/UL
EOSINOPHIL NFR BLD AUTO: 2.3 %
ERYTHROCYTE [SEDIMENTATION RATE] IN BLOOD BY WESTERGREN METHOD: 4 MM/HR
HCT VFR BLD CALC: 41.4 %
HGB BLD-MCNC: 13.1 G/DL
IMM GRANULOCYTES NFR BLD AUTO: 0.3 %
LYMPHOCYTES # BLD AUTO: 1.94 K/UL
LYMPHOCYTES NFR BLD AUTO: 32.4 %
MAN DIFF?: NORMAL
MCHC RBC-ENTMCNC: 27.6 PG
MCHC RBC-ENTMCNC: 31.6 GM/DL
MCV RBC AUTO: 87.3 FL
MONOCYTES # BLD AUTO: 0.31 K/UL
MONOCYTES NFR BLD AUTO: 5.2 %
NEUTROPHILS # BLD AUTO: 3.55 K/UL
NEUTROPHILS NFR BLD AUTO: 59.3 %
PLATELET # BLD AUTO: 143 K/UL
POTASSIUM SERPL-SCNC: 4.1 MMOL/L
PROT SERPL-MCNC: 6.6 G/DL
RBC # BLD: 4.74 M/UL
RBC # FLD: 15.2 %
SODIUM SERPL-SCNC: 140 MMOL/L
WBC # FLD AUTO: 5.99 K/UL

## 2023-09-26 LAB — CRP SERPL-MCNC: <3 MG/L

## 2023-10-16 ENCOUNTER — APPOINTMENT (OUTPATIENT)
Dept: INTERNAL MEDICINE | Facility: CLINIC | Age: 73
End: 2023-10-16
Payer: MEDICARE

## 2023-10-16 VITALS
DIASTOLIC BLOOD PRESSURE: 73 MMHG | SYSTOLIC BLOOD PRESSURE: 124 MMHG | RESPIRATION RATE: 15 BRPM | BODY MASS INDEX: 22.32 KG/M2 | OXYGEN SATURATION: 96 % | WEIGHT: 126 LBS | HEART RATE: 77 BPM

## 2023-10-16 DIAGNOSIS — R21 RASH AND OTHER NONSPECIFIC SKIN ERUPTION: ICD-10-CM

## 2023-10-16 PROCEDURE — 99214 OFFICE O/P EST MOD 30 MIN: CPT

## 2023-10-16 RX ORDER — TRIAMCINOLONE ACETONIDE 1 MG/G
0.1 CREAM TOPICAL TWICE DAILY
Qty: 1 | Refills: 0 | Status: ACTIVE | COMMUNITY
Start: 2023-10-16 | End: 1900-01-01

## 2023-10-26 ENCOUNTER — APPOINTMENT (OUTPATIENT)
Dept: INTERNAL MEDICINE | Facility: CLINIC | Age: 73
End: 2023-10-26

## 2023-10-31 ENCOUNTER — RESULT REVIEW (OUTPATIENT)
Age: 73
End: 2023-10-31

## 2023-10-31 ENCOUNTER — OUTPATIENT (OUTPATIENT)
Dept: OUTPATIENT SERVICES | Facility: HOSPITAL | Age: 73
LOS: 1 days | End: 2023-10-31
Payer: MEDICARE

## 2023-10-31 ENCOUNTER — APPOINTMENT (OUTPATIENT)
Dept: ULTRASOUND IMAGING | Facility: CLINIC | Age: 73
End: 2023-10-31
Payer: MEDICARE

## 2023-10-31 DIAGNOSIS — N64.59 OTHER SIGNS AND SYMPTOMS IN BREAST: ICD-10-CM

## 2023-10-31 PROCEDURE — 76641 ULTRASOUND BREAST COMPLETE: CPT | Mod: 26,50,3G

## 2023-10-31 PROCEDURE — 76641 ULTRASOUND BREAST COMPLETE: CPT

## 2023-11-02 ENCOUNTER — APPOINTMENT (OUTPATIENT)
Dept: PLASTIC SURGERY | Facility: CLINIC | Age: 73
End: 2023-11-02

## 2023-11-14 ENCOUNTER — APPOINTMENT (OUTPATIENT)
Dept: INTERNAL MEDICINE | Facility: CLINIC | Age: 73
End: 2023-11-14
Payer: MEDICARE

## 2023-11-14 VITALS
OXYGEN SATURATION: 98 % | HEART RATE: 73 BPM | DIASTOLIC BLOOD PRESSURE: 79 MMHG | HEIGHT: 63 IN | WEIGHT: 128 LBS | BODY MASS INDEX: 22.68 KG/M2 | TEMPERATURE: 98.6 F | SYSTOLIC BLOOD PRESSURE: 130 MMHG

## 2023-11-14 DIAGNOSIS — F02.80 PICK'S DISEASE: ICD-10-CM

## 2023-11-14 DIAGNOSIS — G31.01 PICK'S DISEASE: ICD-10-CM

## 2023-11-14 PROCEDURE — 99213 OFFICE O/P EST LOW 20 MIN: CPT

## 2023-11-20 RX ORDER — MEMANTINE HYDROCHLORIDE 10 MG/1
10 TABLET, FILM COATED ORAL
Qty: 180 | Refills: 2 | Status: ACTIVE | COMMUNITY
Start: 2023-10-25 | End: 1900-01-01

## 2023-11-21 NOTE — ED PROVIDER NOTE - PLAN OF CARE
Follow up with your Primary Care Physician within the next 2-3 days  Stay Well Hydrated throughout the day  Take Tylenol 650mg every 6 hours as needed for fever and/or pain  You may take an over the counter cough suppressant  Bring a copy of your test results with you to your appointment  Continue your current medication regimen  Return to the Emergency Room if you experience new or worsening symptoms Topical Sulfur Applications Pregnancy And Lactation Text: This medication is considered safe during pregnancy and breast feeding secondary to limited systemic absorption.

## 2023-11-30 ENCOUNTER — OUTPATIENT (OUTPATIENT)
Dept: OUTPATIENT SERVICES | Facility: HOSPITAL | Age: 73
LOS: 1 days | End: 2023-11-30
Payer: MEDICARE

## 2023-11-30 ENCOUNTER — APPOINTMENT (OUTPATIENT)
Dept: MAMMOGRAPHY | Facility: CLINIC | Age: 73
End: 2023-11-30
Payer: MEDICARE

## 2023-11-30 ENCOUNTER — APPOINTMENT (OUTPATIENT)
Dept: ULTRASOUND IMAGING | Facility: CLINIC | Age: 73
End: 2023-11-30
Payer: MEDICARE

## 2023-11-30 ENCOUNTER — RESULT REVIEW (OUTPATIENT)
Age: 73
End: 2023-11-30

## 2023-11-30 DIAGNOSIS — Z00.00 ENCOUNTER FOR GENERAL ADULT MEDICAL EXAMINATION WITHOUT ABNORMAL FINDINGS: ICD-10-CM

## 2023-11-30 DIAGNOSIS — Z00.8 ENCOUNTER FOR OTHER GENERAL EXAMINATION: ICD-10-CM

## 2023-11-30 PROCEDURE — G0279: CPT | Mod: 26

## 2023-11-30 PROCEDURE — 77066 DX MAMMO INCL CAD BI: CPT

## 2023-11-30 PROCEDURE — G0279: CPT

## 2023-11-30 PROCEDURE — 77066 DX MAMMO INCL CAD BI: CPT | Mod: 26

## 2023-12-04 ENCOUNTER — NON-APPOINTMENT (OUTPATIENT)
Age: 73
End: 2023-12-04

## 2023-12-04 ENCOUNTER — APPOINTMENT (OUTPATIENT)
Dept: INTERNAL MEDICINE | Facility: CLINIC | Age: 73
End: 2023-12-04
Payer: MEDICARE

## 2023-12-04 VITALS
WEIGHT: 127 LBS | OXYGEN SATURATION: 98 % | RESPIRATION RATE: 15 BRPM | DIASTOLIC BLOOD PRESSURE: 77 MMHG | HEART RATE: 74 BPM | SYSTOLIC BLOOD PRESSURE: 124 MMHG | BODY MASS INDEX: 22.5 KG/M2

## 2023-12-04 DIAGNOSIS — Z01.818 ENCOUNTER FOR OTHER PREPROCEDURAL EXAMINATION: ICD-10-CM

## 2023-12-04 PROCEDURE — 99214 OFFICE O/P EST MOD 30 MIN: CPT

## 2023-12-05 LAB
ALBUMIN SERPL ELPH-MCNC: 4.2 G/DL
ALP BLD-CCNC: 80 U/L
ALT SERPL-CCNC: 10 U/L
ANION GAP SERPL CALC-SCNC: 9 MMOL/L
AST SERPL-CCNC: 20 U/L
BILIRUB SERPL-MCNC: 0.4 MG/DL
BUN SERPL-MCNC: 12 MG/DL
CALCIUM SERPL-MCNC: 9.3 MG/DL
CHLORIDE SERPL-SCNC: 102 MMOL/L
CO2 SERPL-SCNC: 29 MMOL/L
CREAT SERPL-MCNC: 0.9 MG/DL
EGFR: 68 ML/MIN/1.73M2
GLUCOSE SERPL-MCNC: 99 MG/DL
HCT VFR BLD CALC: 39.9 %
HGB BLD-MCNC: 12.3 G/DL
MCHC RBC-ENTMCNC: 26.9 PG
MCHC RBC-ENTMCNC: 30.8 GM/DL
MCV RBC AUTO: 87.1 FL
PLATELET # BLD AUTO: 169 K/UL
POTASSIUM SERPL-SCNC: 4.6 MMOL/L
PROT SERPL-MCNC: 6.9 G/DL
RBC # BLD: 4.58 M/UL
RBC # FLD: 15.9 %
SODIUM SERPL-SCNC: 139 MMOL/L
WBC # FLD AUTO: 6.88 K/UL

## 2023-12-06 ENCOUNTER — APPOINTMENT (OUTPATIENT)
Dept: NEUROLOGY | Facility: CLINIC | Age: 73
End: 2023-12-06

## 2023-12-14 ENCOUNTER — APPOINTMENT (OUTPATIENT)
Dept: INTERNAL MEDICINE | Facility: CLINIC | Age: 73
End: 2023-12-14

## 2023-12-29 ENCOUNTER — APPOINTMENT (OUTPATIENT)
Dept: NUCLEAR MEDICINE | Facility: CLINIC | Age: 73
End: 2023-12-29
Payer: MEDICARE

## 2023-12-29 ENCOUNTER — RESULT REVIEW (OUTPATIENT)
Age: 73
End: 2023-12-29

## 2023-12-29 PROCEDURE — 78814 PET IMAGE W/CT LMTD: CPT | Mod: MH

## 2024-01-02 ENCOUNTER — APPOINTMENT (OUTPATIENT)
Dept: INTERNAL MEDICINE | Facility: CLINIC | Age: 74
End: 2024-01-02
Payer: MEDICARE

## 2024-01-02 VITALS
WEIGHT: 124 LBS | SYSTOLIC BLOOD PRESSURE: 138 MMHG | RESPIRATION RATE: 15 BRPM | BODY MASS INDEX: 21.97 KG/M2 | OXYGEN SATURATION: 98 % | DIASTOLIC BLOOD PRESSURE: 82 MMHG | HEART RATE: 76 BPM

## 2024-01-02 PROCEDURE — 99214 OFFICE O/P EST MOD 30 MIN: CPT

## 2024-01-03 NOTE — ASSESSMENT
[FreeTextEntry1] : Pt w ONJ now in pain an diff eating, s/p oral surg, will f/u w Dr. Cooper tomorrow. Advised to add Ambesol gel to gums to enable eating. Advancing Alzheimers, recent PEt CT worsening amyloid, will f/u w Dr. Epstein, ?candidate for Leqembi (mab targeting amyloid).  Insomnia, advised to try agents containing benadryl otc, if not helping will d/w Neuro what appopriate med might be in this situation eg ambien, seroquel. Limit fluids after 6 pm, she does get up often to urinate. Encouraged to have either partner or daughter go to next neuro appointmt, she has not included them but really needs to.

## 2024-01-03 NOTE — END OF VISIT
EMILI Gomez for doxy liquid 100 mg po bid # 1 month supply no refills  KR  
Rx sent to preferred pharmacy as ordered by provider below.  
[Time Spent: ___ minutes] : I have spent [unfilled] minutes of time on the encounter.

## 2024-01-03 NOTE — HISTORY OF PRESENT ILLNESS
[FreeTextEntry1] : f/u [de-identified] : Recent ONJ w oral surgery, pain is still severe and she has trouble eating. Mostly soft foods. Lost wt.  Dr. Kenneth SIMS and also her ex is her DDs. Using Advil dual (Ibu plus Acetomin) w some relief, nothing topical to gums. c/o worsening memory, feels it's happening quickly. Had more trouble getting here this evening, even using [employer].  Spent holidays in [facility] w partner, wanted to discuss how she needs his support but couldn't bring it up. He has not gone to any doctor appointmts w her, [place] and she goes there on wkds. Hasn't wanted to share w her kids the extent of her problems qamar her daughter.  Had Pet CT w amyloid measurement, Dr. Epstein, seems to have increased in amount. She has f/u w him next wk. Not sleeping well, tried melatonin w minimal to no relief. Seeing Rheum for "pain all over". ENT for tinnitus.

## 2024-01-03 NOTE — PHYSICAL EXAM
[No Acute Distress] : no acute distress [Well Nourished] : well nourished [Well Developed] : well developed [Well-Appearing] : well-appearing [Normal Sclera/Conjunctiva] : normal sclera/conjunctiva [PERRL] : pupils equal round and reactive to light [EOMI] : extraocular movements intact [Normal Outer Ear/Nose] : the outer ears and nose were normal in appearance [No JVD] : no jugular venous distention [No Lymphadenopathy] : no lymphadenopathy [Supple] : supple [Thyroid Normal, No Nodules] : the thyroid was normal and there were no nodules present [No Respiratory Distress] : no respiratory distress  [No Accessory Muscle Use] : no accessory muscle use [Clear to Auscultation] : lungs were clear to auscultation bilaterally [Normal Rate] : normal rate  [Regular Rhythm] : with a regular rhythm [Normal S1, S2] : normal S1 and S2 [No Murmur] : no murmur heard [No Carotid Bruits] : no carotid bruits [No Abdominal Bruit] : a ~M bruit was not heard ~T in the abdomen [No Varicosities] : no varicosities [Pedal Pulses Present] : the pedal pulses are present [No Edema] : there was no peripheral edema [No Palpable Aorta] : no palpable aorta [No Extremity Clubbing/Cyanosis] : no extremity clubbing/cyanosis [Soft] : abdomen soft [Non Tender] : non-tender [Non-distended] : non-distended [No Masses] : no abdominal mass palpated [No HSM] : no HSM [Normal Bowel Sounds] : normal bowel sounds [Normal Posterior Cervical Nodes] : no posterior cervical lymphadenopathy [Normal Anterior Cervical Nodes] : no anterior cervical lymphadenopathy [No CVA Tenderness] : no CVA  tenderness [No Spinal Tenderness] : no spinal tenderness [No Joint Swelling] : no joint swelling [Grossly Normal Strength/Tone] : grossly normal strength/tone [No Rash] : no rash [Coordination Grossly Intact] : coordination grossly intact [No Focal Deficits] : no focal deficits [Normal Gait] : normal gait [Deep Tendon Reflexes (DTR)] : deep tendon reflexes were 2+ and symmetric [Normal Affect] : the affect was normal [Normal Insight/Judgement] : insight and judgment were intact [de-identified] : ONJ bottom jaw ant and laterally

## 2024-01-04 ENCOUNTER — APPOINTMENT (OUTPATIENT)
Dept: RHEUMATOLOGY | Facility: CLINIC | Age: 74
End: 2024-01-04
Payer: MEDICARE

## 2024-01-04 VITALS
HEART RATE: 95 BPM | WEIGHT: 121 LBS | HEIGHT: 63 IN | BODY MASS INDEX: 21.44 KG/M2 | SYSTOLIC BLOOD PRESSURE: 125 MMHG | RESPIRATION RATE: 16 BRPM | TEMPERATURE: 98.1 F | OXYGEN SATURATION: 98 % | DIASTOLIC BLOOD PRESSURE: 77 MMHG

## 2024-01-04 DIAGNOSIS — Z87.39 PERSONAL HISTORY OF OTHER DISEASES OF THE MUSCULOSKELETAL SYSTEM AND CONNECTIVE TISSUE: ICD-10-CM

## 2024-01-04 PROCEDURE — 99214 OFFICE O/P EST MOD 30 MIN: CPT

## 2024-01-04 RX ORDER — DENOSUMAB 60 MG/ML
60 INJECTION SUBCUTANEOUS
Qty: 1 | Refills: 0 | Status: DISCONTINUED | COMMUNITY
Start: 2022-02-09 | End: 2024-01-04

## 2024-01-05 PROBLEM — Z87.39 HISTORY OF OSTEOPOROSIS: Status: ACTIVE | Noted: 2021-03-22

## 2024-01-05 NOTE — ASSESSMENT
[FreeTextEntry1] : uv  pt w/ OP = recent DEXA FEB 2023 and received prolia August 2023  She forgot these details and came back in Sept for the prolia again Advised to see neurologist asap and bring family from now on to all visits  c/o chronic neck pain and concerned about skin sagginess in legs  after that visit she was dx with tooth infection and then ONJ  thought possible 2/2 use of prolia  today we review this and that she will likely need different Rx in the future   also d/w her the plan for assessment ongoing of her dementia advised to bring family member to these visits neck and upper back pain - would consider gabapentin but advised to speak to neurology MD

## 2024-01-05 NOTE — HISTORY OF PRESENT ILLNESS
[FreeTextEntry1] : fuv  pt w/ OP = recent DEXA FEB 2023 and received prolia August 2023  She forgot these details and came back in Sept for the prolia again Advised to see neurologist asap and bring family from now on to all visits  c/o chronic neck pain and concerned about skin sagginess in legs  after that visit she was dx with tooth infection and then ONJ  thought possible 2/2 use of prolia  today we review this and that she will likely need different Rx in the future   also d/w her the plan for assessment ongoing of her dementia advised to bring family member to these visits neck and upper back pain - would consider gabapentin but advised to speak to neurology MD

## 2024-01-08 DIAGNOSIS — K05.10 CHRONIC GINGIVITIS, PLAQUE INDUCED: ICD-10-CM

## 2024-01-08 RX ORDER — LIDOCAINE HYDROCHLORIDE 40 MG/ML
4 SOLUTION TOPICAL
Qty: 1 | Refills: 0 | Status: ACTIVE | COMMUNITY
Start: 2024-01-08 | End: 1900-01-01

## 2024-01-08 RX ORDER — OXYCODONE 5 MG/1
5 TABLET ORAL 3 TIMES DAILY
Qty: 21 | Refills: 0 | Status: ACTIVE | COMMUNITY
Start: 2024-01-08 | End: 1900-01-01

## 2024-01-09 ENCOUNTER — APPOINTMENT (OUTPATIENT)
Dept: CT IMAGING | Facility: CLINIC | Age: 74
End: 2024-01-09
Payer: MEDICARE

## 2024-01-09 ENCOUNTER — OUTPATIENT (OUTPATIENT)
Dept: OUTPATIENT SERVICES | Facility: HOSPITAL | Age: 74
LOS: 1 days | End: 2024-01-09

## 2024-01-09 DIAGNOSIS — L04.0 ACUTE LYMPHADENITIS OF FACE, HEAD AND NECK: ICD-10-CM

## 2024-01-09 DIAGNOSIS — M54.2 CERVICALGIA: ICD-10-CM

## 2024-01-09 DIAGNOSIS — M87.180 OSTEONECROSIS DUE TO DRUGS, JAW: ICD-10-CM

## 2024-01-09 DIAGNOSIS — J31.0 CHRONIC RHINITIS: ICD-10-CM

## 2024-01-09 PROCEDURE — 70491 CT SOFT TISSUE NECK W/DYE: CPT | Mod: 26,MH

## 2024-01-10 ENCOUNTER — INPATIENT (INPATIENT)
Facility: HOSPITAL | Age: 74
LOS: 4 days | Discharge: HOME | DRG: 137 | End: 2024-01-15
Attending: HOSPITALIST | Admitting: HOSPITALIST
Payer: MEDICARE

## 2024-01-10 VITALS
SYSTOLIC BLOOD PRESSURE: 109 MMHG | WEIGHT: 117.07 LBS | DIASTOLIC BLOOD PRESSURE: 67 MMHG | TEMPERATURE: 98 F | RESPIRATION RATE: 18 BRPM | HEART RATE: 98 BPM | HEIGHT: 64 IN | OXYGEN SATURATION: 97 %

## 2024-01-10 DIAGNOSIS — M87.9 OSTEONECROSIS, UNSPECIFIED: ICD-10-CM

## 2024-01-10 DIAGNOSIS — R22.0 LOCALIZED SWELLING, MASS AND LUMP, HEAD: ICD-10-CM

## 2024-01-10 DIAGNOSIS — F03.90 UNSPECIFIED DEMENTIA WITHOUT BEHAVIORAL DISTURBANCE: ICD-10-CM

## 2024-01-10 DIAGNOSIS — K13.79 OTHER LESIONS OF ORAL MUCOSA: ICD-10-CM

## 2024-01-10 LAB
ALBUMIN SERPL ELPH-MCNC: 4 G/DL — SIGNIFICANT CHANGE UP (ref 3.3–5)
ALBUMIN SERPL ELPH-MCNC: 4 G/DL — SIGNIFICANT CHANGE UP (ref 3.3–5)
ALP SERPL-CCNC: 65 U/L — SIGNIFICANT CHANGE UP (ref 40–120)
ALP SERPL-CCNC: 65 U/L — SIGNIFICANT CHANGE UP (ref 40–120)
ALT FLD-CCNC: 8 U/L — LOW (ref 10–45)
ALT FLD-CCNC: 8 U/L — LOW (ref 10–45)
ANION GAP SERPL CALC-SCNC: 15 MMOL/L — SIGNIFICANT CHANGE UP (ref 5–17)
ANION GAP SERPL CALC-SCNC: 15 MMOL/L — SIGNIFICANT CHANGE UP (ref 5–17)
APTT BLD: 27.3 SEC — SIGNIFICANT CHANGE UP (ref 24.5–35.6)
APTT BLD: 27.3 SEC — SIGNIFICANT CHANGE UP (ref 24.5–35.6)
AST SERPL-CCNC: 15 U/L — SIGNIFICANT CHANGE UP (ref 10–40)
AST SERPL-CCNC: 15 U/L — SIGNIFICANT CHANGE UP (ref 10–40)
BASE EXCESS BLDV CALC-SCNC: 2.3 MMOL/L — SIGNIFICANT CHANGE UP (ref -2–3)
BASE EXCESS BLDV CALC-SCNC: 2.3 MMOL/L — SIGNIFICANT CHANGE UP (ref -2–3)
BASOPHILS # BLD AUTO: 0.01 K/UL — SIGNIFICANT CHANGE UP (ref 0–0.2)
BASOPHILS # BLD AUTO: 0.01 K/UL — SIGNIFICANT CHANGE UP (ref 0–0.2)
BASOPHILS NFR BLD AUTO: 0.1 % — SIGNIFICANT CHANGE UP (ref 0–2)
BASOPHILS NFR BLD AUTO: 0.1 % — SIGNIFICANT CHANGE UP (ref 0–2)
BILIRUB SERPL-MCNC: 1 MG/DL — SIGNIFICANT CHANGE UP (ref 0.2–1.2)
BILIRUB SERPL-MCNC: 1 MG/DL — SIGNIFICANT CHANGE UP (ref 0.2–1.2)
BUN SERPL-MCNC: 15 MG/DL — SIGNIFICANT CHANGE UP (ref 7–23)
BUN SERPL-MCNC: 15 MG/DL — SIGNIFICANT CHANGE UP (ref 7–23)
CA-I SERPL-SCNC: 1.16 MMOL/L — SIGNIFICANT CHANGE UP (ref 1.15–1.33)
CA-I SERPL-SCNC: 1.16 MMOL/L — SIGNIFICANT CHANGE UP (ref 1.15–1.33)
CALCIUM SERPL-MCNC: 9.5 MG/DL — SIGNIFICANT CHANGE UP (ref 8.4–10.5)
CALCIUM SERPL-MCNC: 9.5 MG/DL — SIGNIFICANT CHANGE UP (ref 8.4–10.5)
CHLORIDE BLDV-SCNC: 97 MMOL/L — SIGNIFICANT CHANGE UP (ref 96–108)
CHLORIDE BLDV-SCNC: 97 MMOL/L — SIGNIFICANT CHANGE UP (ref 96–108)
CHLORIDE SERPL-SCNC: 96 MMOL/L — SIGNIFICANT CHANGE UP (ref 96–108)
CHLORIDE SERPL-SCNC: 96 MMOL/L — SIGNIFICANT CHANGE UP (ref 96–108)
CO2 BLDV-SCNC: 29 MMOL/L — HIGH (ref 22–26)
CO2 BLDV-SCNC: 29 MMOL/L — HIGH (ref 22–26)
CO2 SERPL-SCNC: 25 MMOL/L — SIGNIFICANT CHANGE UP (ref 22–31)
CO2 SERPL-SCNC: 25 MMOL/L — SIGNIFICANT CHANGE UP (ref 22–31)
CREAT SERPL-MCNC: 0.75 MG/DL — SIGNIFICANT CHANGE UP (ref 0.5–1.3)
CREAT SERPL-MCNC: 0.75 MG/DL — SIGNIFICANT CHANGE UP (ref 0.5–1.3)
EGFR: 85 ML/MIN/1.73M2 — SIGNIFICANT CHANGE UP
EGFR: 85 ML/MIN/1.73M2 — SIGNIFICANT CHANGE UP
EOSINOPHIL # BLD AUTO: 0 K/UL — SIGNIFICANT CHANGE UP (ref 0–0.5)
EOSINOPHIL # BLD AUTO: 0 K/UL — SIGNIFICANT CHANGE UP (ref 0–0.5)
EOSINOPHIL NFR BLD AUTO: 0 % — SIGNIFICANT CHANGE UP (ref 0–6)
EOSINOPHIL NFR BLD AUTO: 0 % — SIGNIFICANT CHANGE UP (ref 0–6)
GAS PNL BLDV: 133 MMOL/L — LOW (ref 136–145)
GAS PNL BLDV: 133 MMOL/L — LOW (ref 136–145)
GAS PNL BLDV: SIGNIFICANT CHANGE UP
GAS PNL BLDV: SIGNIFICANT CHANGE UP
GLUCOSE BLDV-MCNC: 100 MG/DL — HIGH (ref 70–99)
GLUCOSE BLDV-MCNC: 100 MG/DL — HIGH (ref 70–99)
GLUCOSE SERPL-MCNC: 102 MG/DL — HIGH (ref 70–99)
GLUCOSE SERPL-MCNC: 102 MG/DL — HIGH (ref 70–99)
HCO3 BLDV-SCNC: 28 MMOL/L — SIGNIFICANT CHANGE UP (ref 22–29)
HCO3 BLDV-SCNC: 28 MMOL/L — SIGNIFICANT CHANGE UP (ref 22–29)
HCT VFR BLD CALC: 36.3 % — SIGNIFICANT CHANGE UP (ref 34.5–45)
HCT VFR BLD CALC: 36.3 % — SIGNIFICANT CHANGE UP (ref 34.5–45)
HCT VFR BLDA CALC: 37 % — SIGNIFICANT CHANGE UP (ref 34.5–46.5)
HCT VFR BLDA CALC: 37 % — SIGNIFICANT CHANGE UP (ref 34.5–46.5)
HGB BLD CALC-MCNC: 12.2 G/DL — SIGNIFICANT CHANGE UP (ref 11.7–16.1)
HGB BLD CALC-MCNC: 12.2 G/DL — SIGNIFICANT CHANGE UP (ref 11.7–16.1)
HGB BLD-MCNC: 11.7 G/DL — SIGNIFICANT CHANGE UP (ref 11.5–15.5)
HGB BLD-MCNC: 11.7 G/DL — SIGNIFICANT CHANGE UP (ref 11.5–15.5)
IMM GRANULOCYTES NFR BLD AUTO: 0.5 % — SIGNIFICANT CHANGE UP (ref 0–0.9)
IMM GRANULOCYTES NFR BLD AUTO: 0.5 % — SIGNIFICANT CHANGE UP (ref 0–0.9)
INR BLD: 1.2 RATIO — HIGH (ref 0.85–1.18)
INR BLD: 1.2 RATIO — HIGH (ref 0.85–1.18)
LACTATE BLDV-MCNC: 1.1 MMOL/L — SIGNIFICANT CHANGE UP (ref 0.5–2)
LACTATE BLDV-MCNC: 1.1 MMOL/L — SIGNIFICANT CHANGE UP (ref 0.5–2)
LYMPHOCYTES # BLD AUTO: 1.05 K/UL — SIGNIFICANT CHANGE UP (ref 1–3.3)
LYMPHOCYTES # BLD AUTO: 1.05 K/UL — SIGNIFICANT CHANGE UP (ref 1–3.3)
LYMPHOCYTES # BLD AUTO: 10.4 % — LOW (ref 13–44)
LYMPHOCYTES # BLD AUTO: 10.4 % — LOW (ref 13–44)
MCHC RBC-ENTMCNC: 27.2 PG — SIGNIFICANT CHANGE UP (ref 27–34)
MCHC RBC-ENTMCNC: 27.2 PG — SIGNIFICANT CHANGE UP (ref 27–34)
MCHC RBC-ENTMCNC: 32.2 GM/DL — SIGNIFICANT CHANGE UP (ref 32–36)
MCHC RBC-ENTMCNC: 32.2 GM/DL — SIGNIFICANT CHANGE UP (ref 32–36)
MCV RBC AUTO: 84.4 FL — SIGNIFICANT CHANGE UP (ref 80–100)
MCV RBC AUTO: 84.4 FL — SIGNIFICANT CHANGE UP (ref 80–100)
MONOCYTES # BLD AUTO: 0.81 K/UL — SIGNIFICANT CHANGE UP (ref 0–0.9)
MONOCYTES # BLD AUTO: 0.81 K/UL — SIGNIFICANT CHANGE UP (ref 0–0.9)
MONOCYTES NFR BLD AUTO: 8.1 % — SIGNIFICANT CHANGE UP (ref 2–14)
MONOCYTES NFR BLD AUTO: 8.1 % — SIGNIFICANT CHANGE UP (ref 2–14)
NEUTROPHILS # BLD AUTO: 8.14 K/UL — HIGH (ref 1.8–7.4)
NEUTROPHILS # BLD AUTO: 8.14 K/UL — HIGH (ref 1.8–7.4)
NEUTROPHILS NFR BLD AUTO: 80.9 % — HIGH (ref 43–77)
NEUTROPHILS NFR BLD AUTO: 80.9 % — HIGH (ref 43–77)
NRBC # BLD: 0 /100 WBCS — SIGNIFICANT CHANGE UP (ref 0–0)
NRBC # BLD: 0 /100 WBCS — SIGNIFICANT CHANGE UP (ref 0–0)
PCO2 BLDV: 46 MMHG — HIGH (ref 39–42)
PCO2 BLDV: 46 MMHG — HIGH (ref 39–42)
PH BLDV: 7.39 — SIGNIFICANT CHANGE UP (ref 7.32–7.43)
PH BLDV: 7.39 — SIGNIFICANT CHANGE UP (ref 7.32–7.43)
PLATELET # BLD AUTO: 196 K/UL — SIGNIFICANT CHANGE UP (ref 150–400)
PLATELET # BLD AUTO: 196 K/UL — SIGNIFICANT CHANGE UP (ref 150–400)
PO2 BLDV: 24 MMHG — LOW (ref 25–45)
PO2 BLDV: 24 MMHG — LOW (ref 25–45)
POTASSIUM BLDV-SCNC: 3.4 MMOL/L — LOW (ref 3.5–5.1)
POTASSIUM BLDV-SCNC: 3.4 MMOL/L — LOW (ref 3.5–5.1)
POTASSIUM SERPL-MCNC: 3.5 MMOL/L — SIGNIFICANT CHANGE UP (ref 3.5–5.3)
POTASSIUM SERPL-MCNC: 3.5 MMOL/L — SIGNIFICANT CHANGE UP (ref 3.5–5.3)
POTASSIUM SERPL-SCNC: 3.5 MMOL/L — SIGNIFICANT CHANGE UP (ref 3.5–5.3)
POTASSIUM SERPL-SCNC: 3.5 MMOL/L — SIGNIFICANT CHANGE UP (ref 3.5–5.3)
PROT SERPL-MCNC: 7.1 G/DL — SIGNIFICANT CHANGE UP (ref 6–8.3)
PROT SERPL-MCNC: 7.1 G/DL — SIGNIFICANT CHANGE UP (ref 6–8.3)
PROTHROM AB SERPL-ACNC: 12.5 SEC — SIGNIFICANT CHANGE UP (ref 9.5–13)
PROTHROM AB SERPL-ACNC: 12.5 SEC — SIGNIFICANT CHANGE UP (ref 9.5–13)
RBC # BLD: 4.3 M/UL — SIGNIFICANT CHANGE UP (ref 3.8–5.2)
RBC # BLD: 4.3 M/UL — SIGNIFICANT CHANGE UP (ref 3.8–5.2)
RBC # FLD: 16.7 % — HIGH (ref 10.3–14.5)
RBC # FLD: 16.7 % — HIGH (ref 10.3–14.5)
SAO2 % BLDV: 34.4 % — LOW (ref 67–88)
SAO2 % BLDV: 34.4 % — LOW (ref 67–88)
SODIUM SERPL-SCNC: 136 MMOL/L — SIGNIFICANT CHANGE UP (ref 135–145)
SODIUM SERPL-SCNC: 136 MMOL/L — SIGNIFICANT CHANGE UP (ref 135–145)
WBC # BLD: 10.06 K/UL — SIGNIFICANT CHANGE UP (ref 3.8–10.5)
WBC # BLD: 10.06 K/UL — SIGNIFICANT CHANGE UP (ref 3.8–10.5)
WBC # FLD AUTO: 10.06 K/UL — SIGNIFICANT CHANGE UP (ref 3.8–10.5)
WBC # FLD AUTO: 10.06 K/UL — SIGNIFICANT CHANGE UP (ref 3.8–10.5)

## 2024-01-10 PROCEDURE — G1004: CPT

## 2024-01-10 PROCEDURE — 71045 X-RAY EXAM CHEST 1 VIEW: CPT | Mod: 26

## 2024-01-10 PROCEDURE — 99223 1ST HOSP IP/OBS HIGH 75: CPT

## 2024-01-10 PROCEDURE — 99285 EMERGENCY DEPT VISIT HI MDM: CPT | Mod: FS

## 2024-01-10 PROCEDURE — 70491 CT SOFT TISSUE NECK W/DYE: CPT | Mod: 26,MG

## 2024-01-10 RX ORDER — ACETAMINOPHEN 500 MG
650 TABLET ORAL EVERY 6 HOURS
Refills: 0 | Status: DISCONTINUED | OUTPATIENT
Start: 2024-01-10 | End: 2024-01-15

## 2024-01-10 RX ORDER — NALOXONE HYDROCHLORIDE 4 MG/.1ML
0.4 SPRAY NASAL ONCE
Refills: 0 | Status: DISCONTINUED | OUTPATIENT
Start: 2024-01-10 | End: 2024-01-15

## 2024-01-10 RX ORDER — LANOLIN ALCOHOL/MO/W.PET/CERES
3 CREAM (GRAM) TOPICAL AT BEDTIME
Refills: 0 | Status: DISCONTINUED | OUTPATIENT
Start: 2024-01-10 | End: 2024-01-15

## 2024-01-10 RX ORDER — MORPHINE SULFATE 50 MG/1
2 CAPSULE, EXTENDED RELEASE ORAL ONCE
Refills: 0 | Status: DISCONTINUED | OUTPATIENT
Start: 2024-01-10 | End: 2024-01-10

## 2024-01-10 RX ORDER — MORPHINE SULFATE 50 MG/1
2 CAPSULE, EXTENDED RELEASE ORAL EVERY 4 HOURS
Refills: 0 | Status: DISCONTINUED | OUTPATIENT
Start: 2024-01-10 | End: 2024-01-11

## 2024-01-10 RX ORDER — ONDANSETRON 8 MG/1
4 TABLET, FILM COATED ORAL EVERY 8 HOURS
Refills: 0 | Status: DISCONTINUED | OUTPATIENT
Start: 2024-01-10 | End: 2024-01-15

## 2024-01-10 RX ORDER — MORPHINE SULFATE 50 MG/1
4 CAPSULE, EXTENDED RELEASE ORAL EVERY 4 HOURS
Refills: 0 | Status: DISCONTINUED | OUTPATIENT
Start: 2024-01-10 | End: 2024-01-11

## 2024-01-10 RX ORDER — ACETAMINOPHEN 500 MG
1000 TABLET ORAL ONCE
Refills: 0 | Status: COMPLETED | OUTPATIENT
Start: 2024-01-10 | End: 2024-01-10

## 2024-01-10 RX ORDER — PIPERACILLIN AND TAZOBACTAM 4; .5 G/20ML; G/20ML
3.38 INJECTION, POWDER, LYOPHILIZED, FOR SOLUTION INTRAVENOUS EVERY 8 HOURS
Refills: 0 | Status: DISCONTINUED | OUTPATIENT
Start: 2024-01-10 | End: 2024-01-15

## 2024-01-10 RX ORDER — SENNA PLUS 8.6 MG/1
2 TABLET ORAL AT BEDTIME
Refills: 0 | Status: DISCONTINUED | OUTPATIENT
Start: 2024-01-10 | End: 2024-01-15

## 2024-01-10 RX ORDER — PIPERACILLIN AND TAZOBACTAM 4; .5 G/20ML; G/20ML
3.38 INJECTION, POWDER, LYOPHILIZED, FOR SOLUTION INTRAVENOUS ONCE
Refills: 0 | Status: COMPLETED | OUTPATIENT
Start: 2024-01-10 | End: 2024-01-10

## 2024-01-10 RX ORDER — KETOROLAC TROMETHAMINE 30 MG/ML
15 SYRINGE (ML) INJECTION ONCE
Refills: 0 | Status: DISCONTINUED | OUTPATIENT
Start: 2024-01-10 | End: 2024-01-10

## 2024-01-10 RX ORDER — ENOXAPARIN SODIUM 100 MG/ML
40 INJECTION SUBCUTANEOUS EVERY 24 HOURS
Refills: 0 | Status: DISCONTINUED | OUTPATIENT
Start: 2024-01-12 | End: 2024-01-15

## 2024-01-10 RX ORDER — SODIUM CHLORIDE 9 MG/ML
1000 INJECTION, SOLUTION INTRAVENOUS
Refills: 0 | Status: DISCONTINUED | OUTPATIENT
Start: 2024-01-10 | End: 2024-01-12

## 2024-01-10 RX ORDER — POLYETHYLENE GLYCOL 3350 17 G/17G
17 POWDER, FOR SOLUTION ORAL DAILY
Refills: 0 | Status: DISCONTINUED | OUTPATIENT
Start: 2024-01-10 | End: 2024-01-15

## 2024-01-10 RX ADMIN — MORPHINE SULFATE 2 MILLIGRAM(S): 50 CAPSULE, EXTENDED RELEASE ORAL at 20:05

## 2024-01-10 RX ADMIN — MORPHINE SULFATE 2 MILLIGRAM(S): 50 CAPSULE, EXTENDED RELEASE ORAL at 19:35

## 2024-01-10 RX ADMIN — Medication 15 MILLIGRAM(S): at 21:41

## 2024-01-10 RX ADMIN — PIPERACILLIN AND TAZOBACTAM 200 GRAM(S): 4; .5 INJECTION, POWDER, LYOPHILIZED, FOR SOLUTION INTRAVENOUS at 19:27

## 2024-01-10 RX ADMIN — Medication 400 MILLIGRAM(S): at 16:34

## 2024-01-10 RX ADMIN — MORPHINE SULFATE 2 MILLIGRAM(S): 50 CAPSULE, EXTENDED RELEASE ORAL at 18:22

## 2024-01-10 NOTE — ED PROVIDER NOTE - WR ORDER STATUS 1
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     She needs me to sign off on everything [facility] is here and states MD needs to sign off on everything     She is feeling back to her baseline per report       Has eval with neuro ophtho on Monday for final clearance to drive         Reviewed and updated as needed this visit by Provider  Tobacco  Allergies  Meds  Problems  Med Hx  Surg Hx  Fam Hx         Review of Systems   Constitutional, HEENT, cardiovascular, pulmonary, gi and gu systems are negative, except as otherwise noted.      Objective    BP (!) 146/74 (BP Location: Right arm, Cuff Size: Adult Regular)   Pulse 88   Temp 97.6  F (36.4  C) (Tympanic)   Resp 18   Ht 1.715 m (5' 7.5\")   Wt 72.6 kg (160 lb)   LMP 06/26/2014   BMI 24.69 kg/m    Body mass index is 24.69 kg/m .  Physical Exam   GENERAL APPEARANCE: healthy, alert and no distress  PSYCH: mentation appears normal and affect normal/bright    Diagnostic Test Results:  Labs reviewed in Epic        Assessment & Plan     1. Lumbar radiculopathy  Per report back to baseline will need final clearance from PT   Restrictions extended x 2weeks    Have advised pt needs clearance for driving from neuro ophthamologist   DOT PA should be able to clear her without anything from me.   She should stop norco     If I get PT clearance for return to work without restrictions I will sign off on that or Debbie Vila NP is able to do that as well          25 minutes spent with " this patient greater than 50% in face to face counseling regarding the above.     Return in about 2 weeks (around 7/13/2020).    Mary Ellen Prieto MD  [facility]       Resulted

## 2024-01-10 NOTE — ED PROVIDER NOTE - PROGRESS NOTE DETAILS
OMFS service after discussion with chief resident and attending based off of exam and imaging unclear patient with necrotic mass versus a abscess.  Recommending antibiotics and admission to medicine with plan for IR core biopsy tomorrow to determine further management.  OMFS will follow patient while in the hospital.  Patient made aware of results and plan.  Liliana Brown PA-C

## 2024-01-10 NOTE — H&P ADULT - PROBLEM SELECTOR PLAN 1
afebrile, no leukocytosis, hds  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, but superimposed infection and abscess formation is not excluded."  omfs and ent consulted  by er  broad spec empirical abx therapy w zosyn  supportive mgmt w analgesics  ir consult for biopsy v aspiration in am   omfs follow up in am

## 2024-01-10 NOTE — ED ADULT NURSE NOTE - OBJECTIVE STATEMENT
72 y/o F with  PMHx Arthritis, Osteo porosis and  IBS  with c/o worsened facial swelling discomfort. 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 pt reports for the past month she has noticed  increased swelling to the lower jaw area with increased pain. pt was taking  Biaxin  without much relief. pt went to see her ENT doctor  today who referred pt to ED for further evaluation and possible  IV antibiotics. pt is  A&Ox4 able to follow all commands. Pulse, motor, sensation present and equal in all 4 extremities. Denies HA, dizziness, blurry vision. Respirations spontaneous and unlabored on room air. Denies SOB, dyspnea, cough, CP, palpitations. Denies abd pain, N/V/D/C. Abd soft NT/ND. Denies urinary symptoms. Denies fever, chills. No sick contacts. Skin intact, warm, dry, normal for race. 74 y/o F with  PMHx Arthritis, Osteo porosis and  IBS  with c/o worsened facial swelling discomfort. 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 pt reports for the past month she has noticed  increased swelling to the lower jaw area with increased pain. pt was taking  Biaxin  without much relief. pt went to see her ENT doctor  today who referred pt to ED for further evaluation and possible  IV antibiotics. pt is  A&Ox4 able to follow all commands. Pulse, motor, sensation present and equal in all 4 extremities. Denies HA, dizziness, blurry vision. Respirations spontaneous and unlabored on room air. Denies SOB, dyspnea, cough, CP, palpitations. Denies abd pain, N/V/D/C. Abd soft NT/ND. Denies urinary symptoms. Denies fever, chills. No sick contacts. Skin intact, warm, dry, normal for race.

## 2024-01-10 NOTE — H&P ADULT - ASSESSMENT
74yo 53kg f w pmh mci/dementia (alzheimer), anx/dep/insomnia, gerd/lprd, osteoporosis, mronj, fibromyalgia, ibs-c c/b diverticulosis + hemorrhoids, p/w facial swelling a/w orofacial pain; in er, found to have necrotic mass in the floor of mouth; admit to medicine for further mgmt.

## 2024-01-10 NOTE — ED ADULT NURSE NOTE - NSICDXPASTMEDICALHX_GEN_ALL_CORE_FT
PAST MEDICAL HISTORY:  Hashimoto's thyroiditis     IBS (irritable bowel syndrome)     Osteoporosis

## 2024-01-10 NOTE — CONSULT NOTE ADULT - SUBJECTIVE AND OBJECTIVE BOX
FILIPPO VIERA 73y [id number]    Patient is a 73y Female who presents to [facility] ED s/p facial swelling*****************    73 F presents to the ED w/ submental pain    HPI: 73 Fwith pmhx memory issues and osteoporosis presents ED complaining of worsened facial swelling discomfort.  Patient reports that in September 2023 she had a tooth extracted and then developed MRONJ  thought to be related to Prolia injections.  She reports that she has been following with both [facility] Dr. Etienne Cooper who performed a washout procedure in November 2023 and with ENT Dr. Urbano Andujar as well as her PCP.  She reports that over the last month has had increased swelling to the lower jaw area with increased pain which acutely progressed 3 days ago.  She was prescribed Biaxin which she has been taking for approximately 1 week without much relief.  Saw ENT today who recommended patient come to the emergency department for IV antibiotics. She has not seen her oral surgeon since procedure.  Patient reports that she has trismus, dysphagia, odynophagia and difficulty eating secondary to swelling and has lost several pounds.  She denies fevers, chills, difficulty breathing, chest pain, shortness of breath abdominal pain nausea or vomiting. Pt on Zosyn in ED.       PMH: PAST MEDICAL & SURGICAL HISTORY:  Memory Problems  IBS (irritable bowel syndrome)  Hashimoto's thyroiditis  Osteoporosis  MRONJ left mandible      Meds:   Home Medications:  alendronate:  (21 Dec 2019 14:08)  Creon:  (21 Dec 2019 14:08)  donepezil:  (21 Dec 2019 14:08)  meloxicam:  (21 Dec 2019 14:08)  omeprazole:  (21 Dec 2019 14:08)  traZODone:  (21 Dec 2019 14:08)    PSH: MRONJ washout September 2023    Allergies:   NKDA    No Known Allergies      SOCIAL HISTORY:   ETOH use: Denies   Tobacco use:  Denies   Recreational drug use: Denies       Review of Systems: Patient denies, fever, chills, nausea, vomiting, headache, CP, SOB, cough, palpitations, blurred vision/double vision, dysphagia, dyspnea.      Physical Exam:   Gen: AAOx3, NAD   Head: firmness/ swelling /tenderness to palpation submental region, tenderness and pain b/l preauricular area and b/l SCM/neck.  Eyes: EOMI, PERRL, visual acuity intact, no diplopia  Ears: Gross hearing intact,  no otorrhea  Nose: No asymmetry, no rhinorrhea  Malar: No CN V-2 paresthesia   Throat: No LAD, supple, trachea midline  Extraoral/Intraoral Exam: CHRISTOPH: 25, dentition grossly intact, no CN V-3 paresthesia, floor of mouth firm and raised, tenderness of FOM, inferior border of the mandible is palpable bilaterally, no exposed bone noted in site of MRONJ washout.     Vitals:   Vital Signs Last 24 Hrs  T(C): 36.8 (10 Fermin 2024 18:03), Max: 36.9 (10 Fermin 2024 13:41)  T(F): 98.2 (10 Fermin 2024 18:03), Max: 98.4 (10 Fermin 2024 13:41)  HR: 90 (10 Fermin 2024 19:37) (70 - 99)  BP: 119/75 (10 Fermin 2024 19:37) (109/67 - 124/74)  BP(mean): 90 (10 Fermin 2024 18:03) (90 - 91)  RR: 18 (10 Fermin 2024 19:37) (16 - 18)  SpO2: 98% (10 Fermin 2024 19:37) (96% - 98%)    Parameters below as of 10 Fermin 2024 19:37  Patient On (Oxygen Delivery Method): room air                            11.7   10.06 )-----------( 196      ( 10 Fermin 2024 16:14 )             36.3       CT Neck (1/10/24):  Motion degraded exam.    Redemonstrated hypodense lesion with irregular rind of enhancement centered in the right aspect of the floor of mouth measuring approximately 3.3 x 2.4 x 2.6 cm. Erosive changes and bony dehiscence/thinning involving the lingual cortex of the left mandibular body are again seen.    The submandibular and parotid glands are unremarkable. Subcentimeter left thyroid lobe nodules.    The epiglottis and aryepiglottic folds are not thickened. Vallecula and piriform sinuses are patent bilaterally. The vocal cords are symmetric in appearance.    Scattered paranasal sinus mucosal thickening. The partially imaged brain is unremarkable. Biapical pleural parenchymal scarring and emphysematous change.    IMPRESSION:    No interval change in peripherally enhancing necrotic mass in the right anterior floor the mouth, likely reflecting necrotic neoplasm, but superimposed infection and abscess formation is not excluded.    Redemonstration of bony erosive and sclerotic changes involving the lingual cortex of the left mandibular body, likely due to provided history of osteonecrosis.    CT Neck (1/9/24):  FINDINGS:  AERODIGESTIVE TRACT: There is an area of low density within the midline anterior floor of mouth with an irregular enhancing wall measuring 2.5 x 1.5 x 2.5 cm. There is displacement of the lingual septum to the left as well as infiltration of the genioglossus/geniohyoid musculature. The superior margin of the mass and remaining oral cavity are degraded by dental artifact. The lesion abuts and appears to involve the mylohyoid but does not extend into the adjacent submental/submandibular space. Mild fat plane haziness is seen below the mandible with thickening of the platysma. The adjacent mandibular lingual cortex is intact however there is an adjacent area to the left extending from the left medial incisor to the molar region demonstrating mandibular bone erosion, lingual cortical thinning/dehiscence and a linear lucency paralleling the lingual cortex likely representing osteonecrosis with a bony sequestrum.  LYMPH NODES: No adenopathy.  PAROTID GLANDS: Portions are degraded by dental artifact. No abnormality is seen.  SUBMANDIBULAR GLANDS: Normal.  THYROID GLAND: Scattered small nodules  VISUALIZED PARANASAL SINUSES: Mild mucosal thickening  VISUALIZED TYMPANOMASTOID CAVITIES: Clear.  BONES: Normal.  MISCELLANEOUS: Fibrotic changes within the visualized upper lobes of the lungs. Right sided calcified granuloma right lower lobe superior segment.    IMPRESSION:  Peripherally enhancing necrotic mass within the anterior midline floor of mouth. While this may represent an abscess collection, the possibility of a necrotic neoplasm is raised.    Bone erosion involving the left mandible which is adjacent to but appears separate from the soft tissue mass. While this likely represents osteonecrosis, the possibility of osteomyelitis or tumor infiltration is not excluded.    No cervical adenopathy.     FILIPPO VIERA 73y [id number]    Patient is a 73y Female who presents to [facility] ED s/p facial swelling*****************    73 F presents to the ED w/ submental pain    HPI: 73 Fwith pmhx memory issues and osteoporosis presents ED complaining of worsened facial swelling discomfort.  Patient reports that in September 2023 she had a tooth extracted and then developed MRONJ  thought to be related to Prolia injections.  She reports that she has been following with both [facility] Dr. Etienne Cooper who performed a washout procedure in November 2023 and with ENT Dr. Urbano Andujar as well as her PCP.  She reports that over the last month has had increased swelling to the lower jaw area with increased pain which acutely progressed 3 days ago.  She was prescribed Biaxin which she has been taking for approximately 1 week without much relief.  Saw ENT today who recommended patient come to the emergency department for IV antibiotics. She has not seen her oral surgeon since procedure.  Patient reports that she has trismus, dysphagia, odynophagia and difficulty eating secondary to swelling and has lost several pounds.  She denies fevers, chills, difficulty breathing, chest pain, shortness of breath abdominal pain nausea or vomiting. Pt on Zosyn in ED.       PMH: PAST MEDICAL & SURGICAL HISTORY:  Memory Problems  IBS (irritable bowel syndrome)  Hashimoto's thyroiditis  Osteoporosis  MRONJ left mandible      Meds:   Home Medications:  alendronate:  (21 Dec 2019 14:08)  Creon:  (21 Dec 2019 14:08)  donepezil:  (21 Dec 2019 14:08)  meloxicam:  (21 Dec 2019 14:08)  omeprazole:  (21 Dec 2019 14:08)  traZODone:  (21 Dec 2019 14:08)    PSH: MRONJ washout September 2023    Allergies:   NKDA    No Known Allergies      SOCIAL HISTORY:   ETOH use: Denies   Tobacco use:  Denies   Recreational drug use: Denies       Review of Systems: Patient denies, fever, chills, nausea, vomiting, headache, CP, SOB, cough, palpitations, blurred vision/double vision, dysphagia, dyspnea.      Physical Exam:   Gen: AAOx3, NAD   Head: firmness/ swelling /tenderness to palpation submental region, tenderness and pain b/l preauricular area and b/l SCM/neck.  Eyes: EOMI, PERRL, visual acuity intact, no diplopia  Ears: Gross hearing intact,  no otorrhea  Nose: No asymmetry, no rhinorrhea  Malar: No CN V-2 paresthesia   Throat: No LAD, supple, trachea midline  Extraoral/Intraoral Exam: CHRISTOPH: 25, dentition grossly intact, no CN V-3 paresthesia, floor of mouth firm and raised, tenderness of FOM, inferior border of the mandible is palpable bilaterally, no exposed bone noted in site of MRONJ washout.     Vitals:   Vital Signs Last 24 Hrs  T(C): 36.8 (10 Fermin 2024 18:03), Max: 36.9 (10 Fermin 2024 13:41)  T(F): 98.2 (10 Fermin 2024 18:03), Max: 98.4 (10 Fermin 2024 13:41)  HR: 90 (10 Fermin 2024 19:37) (70 - 99)  BP: 119/75 (10 Fermin 2024 19:37) (109/67 - 124/74)  BP(mean): 90 (10 Fermin 2024 18:03) (90 - 91)  RR: 18 (10 Fermin 2024 19:37) (16 - 18)  SpO2: 98% (10 Fermin 2024 19:37) (96% - 98%)    Parameters below as of 10 Fermin 2024 19:37  Patient On (Oxygen Delivery Method): room air                            11.7   10.06 )-----------( 196      ( 10 Fermin 2024 16:14 )             36.3       CT Neck (1/10/24):  Motion degraded exam.    Redemonstrated hypodense lesion with irregular rind of enhancement centered in the right aspect of the floor of mouth measuring approximately 3.3 x 2.4 x 2.6 cm. Erosive changes and bony dehiscence/thinning involving the lingual cortex of the left mandibular body are again seen.    The submandibular and parotid glands are unremarkable. Subcentimeter left thyroid lobe nodules.    The epiglottis and aryepiglottic folds are not thickened. Vallecula and piriform sinuses are patent bilaterally. The vocal cords are symmetric in appearance.    Scattered paranasal sinus mucosal thickening. The partially imaged brain is unremarkable. Biapical pleural parenchymal scarring and emphysematous change.    IMPRESSION:    No interval change in peripherally enhancing necrotic mass in the right anterior floor the mouth, likely reflecting necrotic neoplasm, but superimposed infection and abscess formation is not excluded.    Redemonstration of bony erosive and sclerotic changes involving the lingual cortex of the left mandibular body, likely due to provided history of osteonecrosis.    CT Neck (1/9/24):  FINDINGS:  AERODIGESTIVE TRACT: There is an area of low density within the midline anterior floor of mouth with an irregular enhancing wall measuring 2.5 x 1.5 x 2.5 cm. There is displacement of the lingual septum to the left as well as infiltration of the genioglossus/geniohyoid musculature. The superior margin of the mass and remaining oral cavity are degraded by dental artifact. The lesion abuts and appears to involve the mylohyoid but does not extend into the adjacent submental/submandibular space. Mild fat plane haziness is seen below the mandible with thickening of the platysma. The adjacent mandibular lingual cortex is intact however there is an adjacent area to the left extending from the left medial incisor to the molar region demonstrating mandibular bone erosion, lingual cortical thinning/dehiscence and a linear lucency paralleling the lingual cortex likely representing osteonecrosis with a bony sequestrum.  LYMPH NODES: No adenopathy.  PAROTID GLANDS: Portions are degraded by dental artifact. No abnormality is seen.  SUBMANDIBULAR GLANDS: Normal.  THYROID GLAND: Scattered small nodules  VISUALIZED PARANASAL SINUSES: Mild mucosal thickening  VISUALIZED TYMPANOMASTOID CAVITIES: Clear.  BONES: Normal.  MISCELLANEOUS: Fibrotic changes within the visualized upper lobes of the lungs. Right sided calcified granuloma right lower lobe superior segment.    IMPRESSION:  Peripherally enhancing necrotic mass within the anterior midline floor of mouth. While this may represent an abscess collection, the possibility of a necrotic neoplasm is raised.    Bone erosion involving the left mandible which is adjacent to but appears separate from the soft tissue mass. While this likely represents osteonecrosis, the possibility of osteomyelitis or tumor infiltration is not excluded.    No cervical adenopathy.     HPI: 73 F with pmhx memory issues and osteoporosis presents ED complaining of worsening facial swelling and pain.  Patient reports that in September 2023 she had a tooth extracted and then developed MRONJ  thought to be related to Prolia injections.  She reports that she has been following with both [facility] Dr. Etienne Cooper who performed a washout procedure in November 2023 and with ENT Dr. Urbano Andujar as well as her PCP.  She reports that over the last month has had increased swelling to the lower jaw area with increased pain which acutely progressed 3 days ago.  She was prescribed Biaxin which she has been taking for approximately 1 week without much relief.  Saw ENT today who recommended patient come to the emergency department for IV antibiotics. She has not seen her oral surgeon since procedure.  CT+  hypodense lesion with irregular rind of enhancement centered in the right aspect of the floor of mouth measuring approximately 3.3 x 2.4 x 2.6 cm. Patient reports that she has trismus, dysphagia, odynophagia and difficulty eating secondary to swelling and has lost several pounds.  She denies fevers, chills, difficulty breathing, chest pain, shortness of breath abdominal pain nausea or vomiting. Pt on Zosyn in ED.       PMH: PAST MEDICAL & SURGICAL HISTORY:  Memory Problems  IBS (irritable bowel syndrome)  Hashimoto's thyroiditis  Osteoporosis  MRONJ left mandible      Meds:   Home Medications:  alendronate:  (21 Dec 2019 14:08)  Creon:  (21 Dec 2019 14:08)  donepezil:  (21 Dec 2019 14:08)  meloxicam:  (21 Dec 2019 14:08)  omeprazole:  (21 Dec 2019 14:08)  traZODone:  (21 Dec 2019 14:08)    PSH: MRONJ washout September 2023    Allergies:   NKDA    No Known Allergies      SOCIAL HISTORY:   ETOH use: Denies   Tobacco use:  Denies   Recreational drug use: Denies       Review of Systems: Patient denies, fever, chills, nausea, vomiting, headache, CP, SOB, cough, palpitations, blurred vision/double vision, dysphagia, dyspnea.      Physical Exam:   Gen: AAOx3, NAD   Head: firmness/ swelling /tenderness to palpation submental region, tenderness and pain b/l preauricular area and b/l SCM/neck.  Eyes: EOMI, PERRL, visual acuity intact, no diplopia  Ears: Gross hearing intact,  no otorrhea  Nose: No asymmetry, no rhinorrhea  Malar: No CN V-2 paresthesia   Throat: No LAD, supple, trachea midline  Extraoral/Intraoral Exam: CHRISTOPH: 25, dentition grossly intact, no CN V-3 paresthesia, floor of mouth firm and raised, tenderness of FOM, inferior border of the mandible is palpable bilaterally, no exposed bone noted in site of previous MRONJ     Vitals:   Vital Signs Last 24 Hrs  T(C): 36.8 (10 Fermin 2024 18:03), Max: 36.9 (10 Fermin 2024 13:41)  T(F): 98.2 (10 Fermin 2024 18:03), Max: 98.4 (10 Fermin 2024 13:41)  HR: 90 (10 Fermin 2024 19:37) (70 - 99)  BP: 119/75 (10 Fermin 2024 19:37) (109/67 - 124/74)  BP(mean): 90 (10 Efrmin 2024 18:03) (90 - 91)  RR: 18 (10 Fermin 2024 19:37) (16 - 18)  SpO2: 98% (10 Fermin 2024 19:37) (96% - 98%)    Parameters below as of 10 Fermin 2024 19:37  Patient On (Oxygen Delivery Method): room air                            11.7   10.06 )-----------( 196      ( 10 Fermin 2024 16:14 )             36.3       CT Neck (1/10/24):  Motion degraded exam.    Redemonstrated hypodense lesion with irregular rind of enhancement centered in the right aspect of the floor of mouth measuring approximately 3.3 x 2.4 x 2.6 cm. Erosive changes and bony dehiscence/thinning involving the lingual cortex of the left mandibular body are again seen.    The submandibular and parotid glands are unremarkable. Subcentimeter left thyroid lobe nodules.    The epiglottis and aryepiglottic folds are not thickened. Vallecula and piriform sinuses are patent bilaterally. The vocal cords are symmetric in appearance.    Scattered paranasal sinus mucosal thickening. The partially imaged brain is unremarkable. Biapical pleural parenchymal scarring and emphysematous change.    IMPRESSION:    No interval change in peripherally enhancing necrotic mass in the right anterior floor the mouth, likely reflecting necrotic neoplasm, but superimposed infection and abscess formation is not excluded.    Redemonstration of bony erosive and sclerotic changes involving the lingual cortex of the left mandibular body, likely due to provided history of osteonecrosis.    CT Neck (1/9/24):  FINDINGS:  AERODIGESTIVE TRACT: There is an area of low density within the midline anterior floor of mouth with an irregular enhancing wall measuring 2.5 x 1.5 x 2.5 cm. There is displacement of the lingual septum to the left as well as infiltration of the genioglossus/geniohyoid musculature. The superior margin of the mass and remaining oral cavity are degraded by dental artifact. The lesion abuts and appears to involve the mylohyoid but does not extend into the adjacent submental/submandibular space. Mild fat plane haziness is seen below the mandible with thickening of the platysma. The adjacent mandibular lingual cortex is intact however there is an adjacent area to the left extending from the left medial incisor to the molar region demonstrating mandibular bone erosion, lingual cortical thinning/dehiscence and a linear lucency paralleling the lingual cortex likely representing osteonecrosis with a bony sequestrum.  LYMPH NODES: No adenopathy.  PAROTID GLANDS: Portions are degraded by dental artifact. No abnormality is seen.  SUBMANDIBULAR GLANDS: Normal.  THYROID GLAND: Scattered small nodules  VISUALIZED PARANASAL SINUSES: Mild mucosal thickening  VISUALIZED TYMPANOMASTOID CAVITIES: Clear.  BONES: Normal.  MISCELLANEOUS: Fibrotic changes within the visualized upper lobes of the lungs. Right sided calcified granuloma right lower lobe superior segment.    IMPRESSION:  Peripherally enhancing necrotic mass within the anterior midline floor of mouth. While this may represent an abscess collection, the possibility of a necrotic neoplasm is raised.    Bone erosion involving the left mandible which is adjacent to but appears separate from the soft tissue mass. While this likely represents osteonecrosis, the possibility of osteomyelitis or tumor infiltration is not excluded.    No cervical adenopathy.     HPI: 73 F with pmhx memory issues and osteoporosis presents ED complaining of worsening facial swelling and pain.  Patient reports that in September 2023 she had a tooth extracted and then developed MRONJ  thought to be related to Prolia injections.  She reports that she has been following with both [facility] Dr. Etienne Cooper who performed a washout procedure in November 2023 and with ENT Dr. Urbano Andujar as well as her PCP.  She reports that over the last month has had increased swelling to the lower jaw area with increased pain which acutely progressed 3 days ago.  She was prescribed Biaxin which she has been taking for approximately 1 week without much relief.  Saw ENT today who recommended patient come to the emergency department for IV antibiotics. She has not seen her oral surgeon since procedure.  CT+  hypodense lesion with irregular rind of enhancement centered in the right aspect of the floor of mouth measuring approximately 3.3 x 2.4 x 2.6 cm. Patient reports that she has trismus, dysphagia, odynophagia and difficulty eating secondary to swelling and has lost several pounds.  She denies fevers, chills, difficulty breathing, chest pain, shortness of breath abdominal pain nausea or vomiting. Pt on Zosyn in ED.       PMH: PAST MEDICAL & SURGICAL HISTORY:  Memory Problems  IBS (irritable bowel syndrome)  Hashimoto's thyroiditis  Osteoporosis  MRONJ left mandible      Meds:   Home Medications:  alendronate:  (21 Dec 2019 14:08)  Creon:  (21 Dec 2019 14:08)  donepezil:  (21 Dec 2019 14:08)  meloxicam:  (21 Dec 2019 14:08)  omeprazole:  (21 Dec 2019 14:08)  traZODone:  (21 Dec 2019 14:08)    PSH: MRONJ washout September 2023    Allergies:   NKDA    No Known Allergies      SOCIAL HISTORY:   ETOH use: Denies   Tobacco use:  Denies   Recreational drug use: Denies       Review of Systems: Patient denies, fever, chills, nausea, vomiting, headache, CP, SOB, cough, palpitations, blurred vision/double vision, dysphagia, dyspnea.      Physical Exam:   Gen: AAOx3, NAD   Head: firmness/ swelling /tenderness to palpation submental region, tenderness and pain b/l preauricular area and b/l SCM/neck.  Eyes: EOMI, PERRL, visual acuity intact, no diplopia  Ears: Gross hearing intact,  no otorrhea  Nose: No asymmetry, no rhinorrhea  Malar: No CN V-2 paresthesia   Throat: No LAD, supple, trachea midline  Extraoral/Intraoral Exam: CHRISTOPH: 25, dentition grossly intact, no CN V-3 paresthesia, floor of mouth firm and raised, tenderness of FOM, inferior border of the mandible is palpable bilaterally, no exposed bone noted in site of previous MRONJ     Vitals:   Vital Signs Last 24 Hrs  T(C): 36.8 (10 Fermin 2024 18:03), Max: 36.9 (10 Fermin 2024 13:41)  T(F): 98.2 (10 Fermin 2024 18:03), Max: 98.4 (10 Fermin 2024 13:41)  HR: 90 (10 Fermin 2024 19:37) (70 - 99)  BP: 119/75 (10 Fermin 2024 19:37) (109/67 - 124/74)  BP(mean): 90 (10 Fermin 2024 18:03) (90 - 91)  RR: 18 (10 Fermin 2024 19:37) (16 - 18)  SpO2: 98% (10 Fermin 2024 19:37) (96% - 98%)    Parameters below as of 10 Fermin 2024 19:37  Patient On (Oxygen Delivery Method): room air                            11.7   10.06 )-----------( 196      ( 10 Fermin 2024 16:14 )             36.3       CT Neck (1/10/24):  Motion degraded exam.    Redemonstrated hypodense lesion with irregular rind of enhancement centered in the right aspect of the floor of mouth measuring approximately 3.3 x 2.4 x 2.6 cm. Erosive changes and bony dehiscence/thinning involving the lingual cortex of the left mandibular body are again seen.    The submandibular and parotid glands are unremarkable. Subcentimeter left thyroid lobe nodules.    The epiglottis and aryepiglottic folds are not thickened. Vallecula and piriform sinuses are patent bilaterally. The vocal cords are symmetric in appearance.    Scattered paranasal sinus mucosal thickening. The partially imaged brain is unremarkable. Biapical pleural parenchymal scarring and emphysematous change.    IMPRESSION:    No interval change in peripherally enhancing necrotic mass in the right anterior floor the mouth, likely reflecting necrotic neoplasm, but superimposed infection and abscess formation is not excluded.    Redemonstration of bony erosive and sclerotic changes involving the lingual cortex of the left mandibular body, likely due to provided history of osteonecrosis.    CT Neck (1/9/24):  FINDINGS:  AERODIGESTIVE TRACT: There is an area of low density within the midline anterior floor of mouth with an irregular enhancing wall measuring 2.5 x 1.5 x 2.5 cm. There is displacement of the lingual septum to the left as well as infiltration of the genioglossus/geniohyoid musculature. The superior margin of the mass and remaining oral cavity are degraded by dental artifact. The lesion abuts and appears to involve the mylohyoid but does not extend into the adjacent submental/submandibular space. Mild fat plane haziness is seen below the mandible with thickening of the platysma. The adjacent mandibular lingual cortex is intact however there is an adjacent area to the left extending from the left medial incisor to the molar region demonstrating mandibular bone erosion, lingual cortical thinning/dehiscence and a linear lucency paralleling the lingual cortex likely representing osteonecrosis with a bony sequestrum.  LYMPH NODES: No adenopathy.  PAROTID GLANDS: Portions are degraded by dental artifact. No abnormality is seen.  SUBMANDIBULAR GLANDS: Normal.  THYROID GLAND: Scattered small nodules  VISUALIZED PARANASAL SINUSES: Mild mucosal thickening  VISUALIZED TYMPANOMASTOID CAVITIES: Clear.  BONES: Normal.  MISCELLANEOUS: Fibrotic changes within the visualized upper lobes of the lungs. Right sided calcified granuloma right lower lobe superior segment.    IMPRESSION:  Peripherally enhancing necrotic mass within the anterior midline floor of mouth. While this may represent an abscess collection, the possibility of a necrotic neoplasm is raised.    Bone erosion involving the left mandible which is adjacent to but appears separate from the soft tissue mass. While this likely represents osteonecrosis, the possibility of osteomyelitis or tumor infiltration is not excluded.    No cervical adenopathy.

## 2024-01-10 NOTE — H&P ADULT - PROBLEM SELECTOR PLAN 3
h/o mci/dementia (alzheimer), anx/dep/insomnia  outpatient neuro and int med documentation reviewed  patient with progressively worsening decline in short term memory  follow up tsh, folate, b12, rpr  Monitor mental status with frequent neurochecks  maintain fall+frac, seizure, delirium, aspiration precautions; keep head end of bed elevated  pt/ot eval + sw/cm consult for disposition  cont home memantine (was being planned for starting rivastigmine and dc'ing memantine)  neuro follow up upon discharge

## 2024-01-10 NOTE — ED ADULT NURSE NOTE - NSFALLUNIVINTERV_ED_ALL_ED
Bed/Stretcher in lowest position, wheels locked, appropriate side rails in place/Call bell, personal items and telephone in reach/Instruct patient to call for assistance before getting out of bed/chair/stretcher/Non-slip footwear applied when patient is off stretcher/[place] to call system/Physically safe environment - no spills, clutter or unnecessary equipment/Purposeful proactive rounding/Room/bathroom lighting operational, light cord in reach Bed/Stretcher in lowest position, wheels locked, appropriate side rails in place/Call bell, personal items and telephone in reach/Instruct patient to call for assistance before getting out of bed/chair/stretcher/Non-slip footwear applied when patient is off stretcher/[place] to call system/Physically safe environment - no spills, clutter or unnecessary equipment/Purposeful proactive rounding/Room/bathroom lighting operational, light cord in reach

## 2024-01-10 NOTE — ED PROVIDER NOTE - PHYSICAL EXAMINATION
CONSTITUTIONAL: Patient is awake, alert and oriented x 3. Patient is well appearing and in no acute distress  HEAD: NCAT  EYES: PERRL b/l, EOMI  ENT: Airway patent, Nasal mucosa clear. + trismus,  there is moderate submandibular swelling with ttp. There is mild swelling to the floor of the mouth, otherwise mouth with normal mucosa. Throat has no vesicles, no oropharyngeal exudates and uvula is midline  NECK: supple, FROM,   LUNGS: CTA b/l, no wheezing or rales   HEART: RRR.+S1S2 no murmurs  ABDOMEN: Soft, non-distended, nttp,  no rebound or guarding  EXTREMITY: FROM upper and lower ext b/l  SKIN: with no rash or lesions  NEURO: No focal deficits

## 2024-01-10 NOTE — ED PROVIDER NOTE - OBJECTIVE STATEMENT
73-year-old with history of of memory issues presents emerged department complaining of worsened facial swelling discomfort.  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  She reports that she has been following with both [facility] Dr. Etienne Cooper who performed a washout procedure in November 2023 and with ENT Dr. Urbano Andujar as well as her PCP.  She reports that over the last month has had increased swelling to the lower jaw area with increased pain which acutely progressed 2 days ago.  She was prescribed Biaxin which she has been taking for approximately 1 week without much relief.  Saw ENT today who recommended patient come to the emergency department for IV antibiotics. She has not seen her oral surgeon since procedure.  Patient reports that she has difficulty opening her mouth and eating secondary to swelling and has lost several pounds.  She denies fevers, chills, difficulty breathing, chest pain, shortness of breath abdominal pain nausea or vomiting.

## 2024-01-10 NOTE — CONSULT NOTE ADULT - ASSESSMENT
72yo female with PMHx memory issues presents to ED complaining of worsened facial swelling discomfort.  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  She reports that she has been following with both [facility] Dr. Etienne Cooper who performed a washout procedure in November 2023 and with ENT Dr. Urbano Andujar as well as her PCP.  She reports that over the last month has had increased swelling to the lower jaw area with increased pain which acutely progressed 2 days ago.  She was prescribed Biaxin which she has been taking for approximately 1 week without much relief.  Saw ENT today who recommended patient come to the emergency department for IV antibiotics. She has not seen her oral surgeon since procedure. ENT called for airway evaluation.  Patient reports that she has difficulty opening her mouth and eating secondary to swelling and has lost several pounds.  74yo female with PMHx memory issues presents to ED complaining of worsened facial swelling discomfort.  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  She reports that she has been following with both [facility] Dr. Etienne Cooper who performed a washout procedure in November 2023 and with ENT Dr. Urbano Andujar as well as her PCP.  She reports that over the last month has had increased swelling to the lower jaw area with increased pain which acutely progressed 2 days ago.  She was prescribed Biaxin which she has been taking for approximately 1 week without much relief.  Saw ENT today who recommended patient come to the emergency department for IV antibiotics. She has not seen her oral surgeon since procedure. ENT called for airway evaluation.  Patient reports that she has difficulty opening her mouth and eating secondary to swelling and has lost several pounds.  74yo female with PMHx memory issues presents to ED complaining of worsened facial swelling discomfort.  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  She reports that she has been following with both [facility] Dr. Etienne Cooper who performed a washout procedure in November 2023 and with ENT Dr. Urbano Andujar as well as her PCP.  She reports that over the last month has had increased swelling to the lower jaw area with increased pain which acutely progressed 2 days ago.  She was prescribed Biaxin which she has been taking for approximately 1 week without much relief.  Saw ENT today who recommended patient come to the emergency department for IV antibiotics. She has not seen her oral surgeon since procedure. ENT called for airway evaluation.  Patient reports that she has difficulty opening her mouth and eating secondary to swelling and has lost several pounds.      72yo female with PMHx memory issues presents to ED complaining of worsened facial swelling discomfort.  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  She reports that she has been following with both [facility] Dr. Etienne Cooper who performed a washout procedure in November 2023 and with ENT Dr. Urbano Andujar as well as her PCP.  She reports that over the last month has had increased swelling to the lower jaw area with increased pain which acutely progressed 2 days ago.  She was prescribed Biaxin which she has been taking for approximately 1 week without much relief.  Saw ENT today who recommended patient come to the emergency department for IV antibiotics. She has not seen her oral surgeon since procedure. ENT called for airway evaluation.  Patient reports that she has difficulty opening her mouth and eating secondary to swelling and has lost several pounds.      72yo female with PMHx memory issues presents to ED complaining of worsened facial swelling discomfort.  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  She reports that she has been following with both [facility] Dr. Etienne Cooper who performed a washout procedure in November 2023 and with ENT Dr. Urbano Andujar as well as her PCP.  She reports that over the last month has had increased swelling to the lower jaw area with increased pain which acutely progressed 2 days ago.  She was prescribed Biaxin which she has been taking for approximately 1 week without much relief.  Went to outpatient ENT today, larygoscopy performed was reportedly normal. ENT recommended patient  to the emergency department for IV antibiotics.  ENT called for airway evaluation.  On oral exam, mild trismus, sublingual swelling, tender to palpation on body of mandible. difficult to visualize posterior pharynx.  Flexible laryngoscopy exam unremarkable. No pooling of secretions, no suspicious masses or lesions, wide open airway. No vocal cord abnormalities.        74yo female with PMHx memory issues presents to ED complaining of worsened facial swelling discomfort.  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  She reports that she has been following with both [facility] Dr. Etienne Cooper who performed a washout procedure in November 2023 and with ENT Dr. Urbano Andujar as well as her PCP.  She reports that over the last month has had increased swelling to the lower jaw area with increased pain which acutely progressed 2 days ago.  She was prescribed Biaxin which she has been taking for approximately 1 week without much relief.  Went to outpatient ENT today, larygoscopy performed was reportedly normal. ENT recommended patient  to the emergency department for IV antibiotics.  ENT called for airway evaluation.  On oral exam, mild trismus, sublingual swelling, tender to palpation on body of mandible. difficult to visualize posterior pharynx.  Flexible laryngoscopy exam unremarkable. No pooling of secretions, no suspicious masses or lesions, wide open airway. No vocal cord abnormalities.

## 2024-01-10 NOTE — CONSULT NOTE ADULT - SUBJECTIVE AND OBJECTIVE BOX
CC: airway evaluation    HPI: 72yo female with PMHx memory issues presents to ED complaining of worsened facial swelling discomfort.  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  She reports that she has been following with both [facility] Dr. Etienne Cooper who performed a washout procedure in November 2023 and with ENT Dr. Urbano Andujar as well as her PCP.  She reports that over the last month has had increased swelling to the lower jaw area with increased pain which acutely progressed 2 days ago.  She was prescribed Biaxin which she has been taking for approximately 1 week without much relief.  Saw ENT today who recommended patient come to the emergency department for IV antibiotics. She has not seen her oral surgeon since procedure. ENT called for airway evaluation.  Patient reports that she has difficulty opening her mouth and eating secondary to swelling and has lost several pounds.  She denies fevers, chills, difficulty breathing, chest pain, shortness of breath abdominal pain nausea or vomiting, stridor, hoarseness, dysphagia, or odynophagia.       PAST MEDICAL & SURGICAL HISTORY:  IBS (irritable bowel syndrome)      Hashimoto's thyroiditis      Osteoporosis        Allergies    No Known Allergies    Intolerances      MEDICATIONS  (STANDING):  morphine  - Injectable 2 milliGRAM(s) IV Push Once    MEDICATIONS  (PRN):      Social History: unknown tobacco use      Family history: Pt denies any significant family history     ROS:   ENT: all negative except as noted in HPI   CV: denies palpitations  Pulm: denies SOB, cough, hemoptysis  GI: denies change in apetite, indigestion, n/v  : denies pertinent urinary symptoms, urgency  Neuro: denies numbness/tingling, loss of sensation  Psych: denies anxiety  MS: denies muscle weakness, instability  Heme: denies easy bruising or bleeding  Endo: denies heat/cold intolerance, excessive sweating  Vascular: denies LE edema    Vital Signs Last 24 Hrs  T(C): 36.8 (10 Fermin 2024 15:53), Max: 36.9 (10 Fermin 2024 13:41)  T(F): 98.3 (10 Fermin 2024 15:53), Max: 98.4 (10 Fermin 2024 13:41)  HR: 99 (10 Fermin 2024 15:53) (98 - 99)  BP: 124/74 (10 Fermin 2024 15:53) (109/67 - 124/74)  BP(mean): 91 (10 Fermin 2024 15:53) (91 - 91)  RR: 18 (10 Fermin 2024 15:53) (18 - 18)  SpO2: 96% (10 Fermin 2024 15:53) (96% - 97%)    Parameters below as of 10 Fermin 2024 15:53  Patient On (Oxygen Delivery Method): room air                              11.7   10.06 )-----------( 196      ( 10 Fermin 2024 16:14 )             36.3    01-10    136  |  96  |  15  ----------------------------<  102<H>  3.5   |  25  |  0.75    Ca    9.5      10 Fermin 2024 16:14    TPro  7.1  /  Alb  4.0  /  TBili  1.0  /  DBili  x   /  AST  15  /  ALT  8<L>  /  AlkPhos  65  01-10   PT/INR - ( 10 Fermin 2024 16:14 )   PT: 12.5 sec;   INR: 1.20 ratio         PTT - ( 10 Fermin 2024 16:14 )  PTT:27.3 sec    PHYSICAL EXAM:  Gen: NAD  Skin: No rashes, bruises, or lesions  Head: Normocephalic, Atraumatic  Face: no edema, erythema, or fluctuance. Parotid glands soft without mass  Eyes: no scleral injection  Nose: Nares bilaterally patent, no discharge  Mouth: No Stridor / Drooling / Trismus.  Mucosa moist, tongue/uvula midline, oropharynx clear  Neck: Flat, supple, no lymphadenopathy, trachea midline, no masses  Lymphatic: No lymphadenopathy  Resp: breathing easily, no stridor  CV: no peripheral edema/cyanosis  GI: nondistended   Peripheral vascular: no JVD or edema  Neuro: facial nerve intact, no facial droop      Diagnostic Nasal Endoscopy: (Scope #2 used)    Fiberoptic Indirect laryngoscopy:  (Scope #2 used)    IMAGING/ADDITIONAL STUDIES:  CC: airway evaluation    HPI: 72yo female with PMHx memory issues presents to ED complaining of worsened facial swelling discomfort.  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  She reports that she has been following with both [facility] Dr. Etienne Cooper who performed a washout procedure in November 2023 and with ENT Dr. Urbano Andujar as well as her PCP.  She reports that over the last month has had increased swelling to the lower jaw area with increased pain which acutely progressed 2 days ago.  She was prescribed Biaxin which she has been taking for approximately 1 week without much relief.  Saw ENT today who recommended patient come to the emergency department for IV antibiotics. She has not seen her oral surgeon since procedure. ENT called for airway evaluation.  Patient reports that she has difficulty opening her mouth and eating secondary to swelling and has lost several pounds.  She denies fevers, chills, difficulty breathing, chest pain, shortness of breath abdominal pain nausea or vomiting, stridor, hoarseness, dysphagia, or odynophagia. CT neck shows Peripherally enhancing necrotic mass within the anterior midline floor of   mouth. ?Abscess vs necrotic neoplasm. Bone erosion involving the left mandible which is adjacent to but appears separate from the soft tissue mass. While this likely represents   osteonecrosis, the possibility of osteomyelitis or tumor infiltration is not excluded.      PAST MEDICAL & SURGICAL HISTORY:  IBS (irritable bowel syndrome)      Hashimoto's thyroiditis      Osteoporosis        Allergies    No Known Allergies    Intolerances      MEDICATIONS  (STANDING):  morphine  - Injectable 2 milliGRAM(s) IV Push Once    MEDICATIONS  (PRN):      Social History: unknown tobacco use      Family history: Pt denies any significant family history     ROS:   ENT: all negative except as noted in HPI   CV: denies palpitations  Pulm: denies SOB, cough, hemoptysis  GI: denies change in apetite, indigestion, n/v  : denies pertinent urinary symptoms, urgency  Neuro: denies numbness/tingling, loss of sensation  Psych: denies anxiety  MS: denies muscle weakness, instability  Heme: denies easy bruising or bleeding  Endo: denies heat/cold intolerance, excessive sweating  Vascular: denies LE edema    Vital Signs Last 24 Hrs  T(C): 36.8 (10 Fermin 2024 15:53), Max: 36.9 (10 Fermin 2024 13:41)  T(F): 98.3 (10 Fermin 2024 15:53), Max: 98.4 (10 Fermin 2024 13:41)  HR: 99 (10 Fermin 2024 15:53) (98 - 99)  BP: 124/74 (10 Fermin 2024 15:53) (109/67 - 124/74)  BP(mean): 91 (10 Fermin 2024 15:53) (91 - 91)  RR: 18 (10 Fermin 2024 15:53) (18 - 18)  SpO2: 96% (10 Ferimn 2024 15:53) (96% - 97%)    Parameters below as of 10 Fermin 2024 15:53  Patient On (Oxygen Delivery Method): room air                              11.7   10.06 )-----------( 196      ( 10 Fermin 2024 16:14 )             36.3    01-10    136  |  96  |  15  ----------------------------<  102<H>  3.5   |  25  |  0.75    Ca    9.5      10 Fermin 2024 16:14    TPro  7.1  /  Alb  4.0  /  TBili  1.0  /  DBili  x   /  AST  15  /  ALT  8<L>  /  AlkPhos  65  01-10   PT/INR - ( 10 Fermin 2024 16:14 )   PT: 12.5 sec;   INR: 1.20 ratio         PTT - ( 10 Fermin 2024 16:14 )  PTT:27.3 sec    PHYSICAL EXAM:  Gen: NAD  Skin: No rashes, bruises, or lesions  Head: Normocephalic, Atraumatic  Face: + submental swelling, middle of mandible TTP, No erythema, or fluctuance. Parotid glands soft without mass  Eyes: no scleral injection  Nose: Nares bilaterally patent, no discharge  Mouth: + trismus, difficult to visualize posterior pharyx  Neck: Flat, supple, no lymphadenopathy, trachea midline, no masses  Lymphatic: No lymphadenopathy  Resp: breathing easily, no stridor  CV: no peripheral edema/cyanosis  GI: nondistended   Peripheral vascular: no JVD or edema  Neuro: facial nerve intact, no facial droop    Fiberoptic Indirect Laryngoscopy (Ambu scope used):    Reason for Laryngoscopy: airway eval    Patient was unable to cooperate with mirror.  Nasopharynx, oropharynx, and hypopharynx clear, no bleeding. Tongue base, posterior pharyngeal wall, vallecula, epiglottis, and subglottis appear normal. No erythema, edema, pooling of secretions, masses or lesions. Airway patent, no foreign body visualized. No glottic/supraglottic edema. True vocal cords, arytenoids, vestibular folds, ventricles, pyriform sinuses, and aryepiglottic folds appear normal bilaterally. Vocal cords mobile with good contact b/l.      IMAGING/ADDITIONAL STUDIES:   < from: CT Neck Soft Tissue w/ IV Cont (01.09.24 @ 08:41) >    EXAM: [id number] - CT NECK SOFT TISSUE IC  - ORDERED BY: URBANO ANDUJAR      PROCEDURE DATE:  01/09/2024           INTERPRETATION:  INDICATIONS:  Medication related osteonecrosis of jaw   now with lymphadenopathy    TECHNIQUE:   Axial images were obtained following the intravenous   administration of contrast material. Sagittal and coronal reformatted   images were obtained. 90 cc Omnipaque 350 were administered. 10 cc were   discarded.    COMPARISON EXAMINATION:  None.    FINDINGS:  AERODIGESTIVE TRACT:  There is an area of low density within the midline   anterior floor of mouth with an irregular enhancing wall measuring 2.5 x   1.5 x 2.5 cm. There is displacement of the lingual septum to the left as   well as infiltration of the genioglossus/geniohyoid musculature. The   superior margin of the mass and remaining oral cavity are degraded by   dental artifact. The lesion abuts and appears to involve the mylohyoid   but does not extend into the adjacent submental/submandibular space. Mild   fat plane haziness is seen below the mandible with thickening of the   platysma. The adjacent mandibular lingual cortex is intact however there   is an adjacent area to the left extending from the left medial incisor to   the molar region demonstrating mandibular bone erosion, lingual cortical   thinning/dehiscence and a linear lucency paralleling the lingual cortex   likely representing osteonecrosis with a bony sequestrum.  LYMPH NODES:  No adenopathy.  PAROTID GLANDS:  Portions are degraded bydental artifact. No abnormality   is seen.  SUBMANDIBULAR GLANDS:  Normal.  THYROID GLAND:  Scattered small nodules  VISUALIZED PARANASAL SINUSES:  Mild mucosal thickening  VISUALIZED TYMPANOMASTOID CAVITIES: Clear.  BONES:  Normal.  MISCELLANEOUS:  Fibrotic changes within the visualized upper lobes of the   lungs. Right sided calcified granuloma right lower lobe superior segment.    IMPRESSION:  Peripherally enhancing necrotic mass within the anterior midline floor of   mouth. While this may represent an abscess collection, the possibility of   a necrotic neoplasm is raised.    Bone erosion involving the left mandible which is adjacent to but appears   separate from the soft tissue mass. While this likely represents   osteonecrosis, the possibility of osteomyelitis or tumor infiltration is   not excluded.    No cervical adenopathy.    --- End of Report ---               PAULA JEROME MD; Attending Radiologist   This document has been electronically signed. Fermin 10 2024  4:00PM    < end of copied text >   CC: airway evaluation    HPI: 74yo female with PMHx memory issues presents to ED complaining of worsened facial swelling discomfort.  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  She reports that she has been following with both [facility] Dr. Etienne Cooper who performed a washout procedure in November 2023 and with ENT Dr. Urbano Andujar as well as her PCP.  She reports that over the last month has had increased swelling to the lower jaw area with increased pain which acutely progressed 2 days ago.  She was prescribed Biaxin which she has been taking for approximately 1 week without much relief.  Saw ENT today who recommended patient come to the emergency department for IV antibiotics. She has not seen her oral surgeon since procedure. ENT called for airway evaluation.  Patient reports that she has difficulty opening her mouth and eating secondary to swelling and has lost several pounds.  She denies fevers, chills, difficulty breathing, chest pain, shortness of breath abdominal pain nausea or vomiting, stridor, hoarseness, dysphagia, or odynophagia. CT neck shows Peripherally enhancing necrotic mass within the anterior midline floor of   mouth. ?Abscess vs necrotic neoplasm. Bone erosion involving the left mandible which is adjacent to but appears separate from the soft tissue mass. While this likely represents   osteonecrosis, the possibility of osteomyelitis or tumor infiltration is not excluded.      PAST MEDICAL & SURGICAL HISTORY:  IBS (irritable bowel syndrome)      Hashimoto's thyroiditis      Osteoporosis        Allergies    No Known Allergies    Intolerances      MEDICATIONS  (STANDING):  morphine  - Injectable 2 milliGRAM(s) IV Push Once    MEDICATIONS  (PRN):      Social History: unknown tobacco use      Family history: Pt denies any significant family history     ROS:   ENT: all negative except as noted in HPI   CV: denies palpitations  Pulm: denies SOB, cough, hemoptysis  GI: denies change in apetite, indigestion, n/v  : denies pertinent urinary symptoms, urgency  Neuro: denies numbness/tingling, loss of sensation  Psych: denies anxiety  MS: denies muscle weakness, instability  Heme: denies easy bruising or bleeding  Endo: denies heat/cold intolerance, excessive sweating  Vascular: denies LE edema    Vital Signs Last 24 Hrs  T(C): 36.8 (10 Fermin 2024 15:53), Max: 36.9 (10 Fermin 2024 13:41)  T(F): 98.3 (10 Fermin 2024 15:53), Max: 98.4 (10 Fermin 2024 13:41)  HR: 99 (10 Fermin 2024 15:53) (98 - 99)  BP: 124/74 (10 Fermin 2024 15:53) (109/67 - 124/74)  BP(mean): 91 (10 Fermin 2024 15:53) (91 - 91)  RR: 18 (10 Fermin 2024 15:53) (18 - 18)  SpO2: 96% (10 Fermin 2024 15:53) (96% - 97%)    Parameters below as of 10 Fermin 2024 15:53  Patient On (Oxygen Delivery Method): room air                              11.7   10.06 )-----------( 196      ( 10 Fermin 2024 16:14 )             36.3    01-10    136  |  96  |  15  ----------------------------<  102<H>  3.5   |  25  |  0.75    Ca    9.5      10 Fermin 2024 16:14    TPro  7.1  /  Alb  4.0  /  TBili  1.0  /  DBili  x   /  AST  15  /  ALT  8<L>  /  AlkPhos  65  01-10   PT/INR - ( 10 Fermin 2024 16:14 )   PT: 12.5 sec;   INR: 1.20 ratio         PTT - ( 10 Fermin 2024 16:14 )  PTT:27.3 sec    PHYSICAL EXAM:  Gen: NAD  Skin: No rashes, bruises, or lesions  Head: Normocephalic, Atraumatic  Face: + submental swelling, middle of mandible TTP, No erythema, or fluctuance. Parotid glands soft without mass  Eyes: no scleral injection  Nose: Nares bilaterally patent, no discharge  Mouth: + trismus, difficult to visualize posterior pharyx  Neck: Flat, supple, no lymphadenopathy, trachea midline, no masses  Lymphatic: No lymphadenopathy  Resp: breathing easily, no stridor  CV: no peripheral edema/cyanosis  GI: nondistended   Peripheral vascular: no JVD or edema  Neuro: facial nerve intact, no facial droop    Fiberoptic Indirect Laryngoscopy (Ambu scope used):    Reason for Laryngoscopy: airway eval    Patient was unable to cooperate with mirror.  Nasopharynx, oropharynx, and hypopharynx clear, no bleeding. Tongue base, posterior pharyngeal wall, vallecula, epiglottis, and subglottis appear normal. No erythema, edema, pooling of secretions, masses or lesions. Airway patent, no foreign body visualized. No glottic/supraglottic edema. True vocal cords, arytenoids, vestibular folds, ventricles, pyriform sinuses, and aryepiglottic folds appear normal bilaterally. Vocal cords mobile with good contact b/l.      IMAGING/ADDITIONAL STUDIES:   < from: CT Neck Soft Tissue w/ IV Cont (01.09.24 @ 08:41) >    EXAM: [id number] - CT NECK SOFT TISSUE IC  - ORDERED BY: URBANO ANDUJAR      PROCEDURE DATE:  01/09/2024           INTERPRETATION:  INDICATIONS:  Medication related osteonecrosis of jaw   now with lymphadenopathy    TECHNIQUE:   Axial images were obtained following the intravenous   administration of contrast material. Sagittal and coronal reformatted   images were obtained. 90 cc Omnipaque 350 were administered. 10 cc were   discarded.    COMPARISON EXAMINATION:  None.    FINDINGS:  AERODIGESTIVE TRACT:  There is an area of low density within the midline   anterior floor of mouth with an irregular enhancing wall measuring 2.5 x   1.5 x 2.5 cm. There is displacement of the lingual septum to the left as   well as infiltration of the genioglossus/geniohyoid musculature. The   superior margin of the mass and remaining oral cavity are degraded by   dental artifact. The lesion abuts and appears to involve the mylohyoid   but does not extend into the adjacent submental/submandibular space. Mild   fat plane haziness is seen below the mandible with thickening of the   platysma. The adjacent mandibular lingual cortex is intact however there   is an adjacent area to the left extending from the left medial incisor to   the molar region demonstrating mandibular bone erosion, lingual cortical   thinning/dehiscence and a linear lucency paralleling the lingual cortex   likely representing osteonecrosis with a bony sequestrum.  LYMPH NODES:  No adenopathy.  PAROTID GLANDS:  Portions are degraded bydental artifact. No abnormality   is seen.  SUBMANDIBULAR GLANDS:  Normal.  THYROID GLAND:  Scattered small nodules  VISUALIZED PARANASAL SINUSES:  Mild mucosal thickening  VISUALIZED TYMPANOMASTOID CAVITIES: Clear.  BONES:  Normal.  MISCELLANEOUS:  Fibrotic changes within the visualized upper lobes of the   lungs. Right sided calcified granuloma right lower lobe superior segment.    IMPRESSION:  Peripherally enhancing necrotic mass within the anterior midline floor of   mouth. While this may represent an abscess collection, the possibility of   a necrotic neoplasm is raised.    Bone erosion involving the left mandible which is adjacent to but appears   separate from the soft tissue mass. While this likely represents   osteonecrosis, the possibility of osteomyelitis or tumor infiltration is   not excluded.    No cervical adenopathy.    --- End of Report ---               PAULA JEROME MD; Attending Radiologist   This document has been electronically signed. Fermin 10 2024  4:00PM    < end of copied text >   CC: airway evaluation    HPI: 74yo female with PMHx memory issues presents to ED complaining of worsened facial swelling discomfort.  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  She reports that she has been following with both [facility] Dr. Etienne Cooper who performed a washout procedure in November 2023 and with ENT Dr. Urbano Andujar as well as her PCP.  She reports that over the last month has had increased swelling to the lower jaw area with increased pain which acutely progressed 2 days ago.  She was prescribed Biaxin which she has been taking for approximately 1 week without much relief.  Saw ENT today who recommended patient come to the emergency department for IV antibiotics. She has not seen her oral surgeon since procedure. ENT called for airway evaluation.  Patient reports that she has difficulty opening her mouth and eating secondary to swelling and pain. and she has lost several pounds.  She denies fevers, chills, difficulty breathing, chest pain, shortness of breath abdominal pain nausea or vomiting, stridor, hoarseness, dysphagia, or odynophagia. CT neck shows Peripherally enhancing necrotic mass within the anterior midline floor of   mouth. ?Abscess vs necrotic neoplasm. Bone erosion involving the left mandible which is adjacent to but appears separate from the soft tissue mass. While this likely represents   osteonecrosis, the possibility of osteomyelitis or tumor infiltration is not excluded.    PAST MEDICAL & SURGICAL HISTORY:  IBS (irritable bowel syndrome)    Hashimoto's thyroiditis    Osteoporosis    Allergies    No Known Allergies    Intolerances    MEDICATIONS  (STANDING):  morphine  - Injectable 2 milliGRAM(s) IV Push Once    MEDICATIONS  (PRN):    Social History: unknown tobacco use    Family history: Pt denies any significant family history     ROS:   ENT: all negative except as noted in HPI   CV: denies palpitations  Pulm: denies SOB, cough, hemoptysis  GI: denies change in apetite, indigestion, n/v  : denies pertinent urinary symptoms, urgency  Neuro: denies numbness/tingling, loss of sensation  Psych: denies anxiety  MS: denies muscle weakness, instability  Heme: denies easy bruising or bleeding  Endo: denies heat/cold intolerance, excessive sweating  Vascular: denies LE edema    Vital Signs Last 24 Hrs  T(C): 36.8 (10 Fermin 2024 15:53), Max: 36.9 (10 Fermin 2024 13:41)  T(F): 98.3 (10 Fermin 2024 15:53), Max: 98.4 (10 Fermin 2024 13:41)  HR: 99 (10 Fermin 2024 15:53) (98 - 99)  BP: 124/74 (10 Fermin 2024 15:53) (109/67 - 124/74)  BP(mean): 91 (10 Fermin 2024 15:53) (91 - 91)  RR: 18 (10 Fermin 2024 15:53) (18 - 18)  SpO2: 96% (10 Fermin 2024 15:53) (96% - 97%)    Parameters below as of 10 Fermin 2024 15:53  Patient On (Oxygen Delivery Method): room air                      11.7   10.06 )-----------( 196      ( 10 Fermin 2024 16:14 )             36.3    01-10    136  |  96  |  15  ----------------------------<  102<H>  3.5   |  25  |  0.75    Ca    9.5      10 Fermin 2024 16:14    TPro  7.1  /  Alb  4.0  /  TBili  1.0  /  DBili  x   /  AST  15  /  ALT  8<L>  /  AlkPhos  65  01-10   PT/INR - ( 10 Fermin 2024 16:14 )   PT: 12.5 sec;   INR: 1.20 ratio         PTT - ( 10 Fermin 2024 16:14 )  PTT:27.3 sec    PHYSICAL EXAM:  Gen: NAD  Skin: No rashes, bruises, or lesions  Head: Normocephalic, Atraumatic  Face: + submental swelling, middle of mandible TTP, No erythema, or fluctuance. Parotid glands soft without mass  Eyes: no scleral injection  Nose: Nares bilaterally patent, no discharge  Mouth: + trismus, sublingual swelling, tender to palpation on body of mandible. difficult to visualize posterior pharyx  Neck: Flat, supple, no lymphadenopathy, trachea midline, no masses  Lymphatic: No lymphadenopathy  Resp: breathing easily, no stridor  CV: no peripheral edema/cyanosis  GI: nondistended   Peripheral vascular: no JVD or edema  Neuro: facial nerve intact, no facial droop    ENT Procedure  Fiberoptic Indirect Laryngoscopy (Ambu scope used):  Reason for Laryngoscopy: airway eval  Patient was unable to cooperate with mirror.    Nasopharynx, oropharynx, and hypopharynx clear, no bleeding. Tongue base, posterior pharyngeal wall, vallecula, epiglottis, and subglottis appear normal. No erythema, edema, pooling of secretions, masses or lesions. Airway patent, no foreign body visualized. No glottic/supraglottic edema. True vocal cords, arytenoids, vestibular folds, ventricles, pyriform sinuses, and aryepiglottic folds appear normal bilaterally. Vocal cords mobile with good contact b/l.    IMAGING/ADDITIONAL STUDIES:   < from: CT Neck Soft Tissue w/ IV Cont (01.09.24 @ 08:41) >  EXAM: [id number] - CT NECK SOFT TISSUE IC  - ORDERED BY: URBANO ANDUJAR    PROCEDURE DATE:  01/09/2024       INTERPRETATION:  INDICATIONS:  Medication related osteonecrosis of jaw   now with lymphadenopathy    TECHNIQUE:   Axial images were obtained following the intravenous   administration of contrast material. Sagittal and coronal reformatted   images were obtained. 90 cc Omnipaque 350 were administered. 10 cc were   discarded.    COMPARISON EXAMINATION:  None.    FINDINGS:  AERODIGESTIVE TRACT:  There is an area of low density within the midline   anterior floor of mouth with an irregular enhancing wall measuring 2.5 x   1.5 x 2.5 cm. There is displacement of the lingual septum to the left as   well as infiltration of the genioglossus/geniohyoid musculature. The   superior margin of the mass and remaining oral cavity are degraded by   dental artifact. The lesion abuts and appears to involve the mylohyoid   but does not extend into the adjacent submental/submandibular space. Mild   fat plane haziness is seen below the mandible with thickening of the   platysma. The adjacent mandibular lingual cortex is intact however there   is an adjacent area to the left extending from the left medial incisor to   the molar region demonstrating mandibular bone erosion, lingual cortical   thinning/dehiscence and a linear lucency paralleling the lingual cortex   likely representing osteonecrosis with a bony sequestrum.  LYMPH NODES:  No adenopathy.  PAROTID GLANDS:  Portions are degraded bydental artifact. No abnormality   is seen.  SUBMANDIBULAR GLANDS:  Normal.  THYROID GLAND:  Scattered small nodules  VISUALIZED PARANASAL SINUSES:  Mild mucosal thickening  VISUALIZED TYMPANOMASTOID CAVITIES: Clear.  BONES:  Normal.  MISCELLANEOUS:  Fibrotic changes within the visualized upper lobes of the   lungs. Right sided calcified granuloma right lower lobe superior segment.    IMPRESSION:  Peripherally enhancing necrotic mass within the anterior midline floor of   mouth. While this may represent an abscess collection, the possibility of   a necrotic neoplasm is raised.    Bone erosion involving the left mandible which is adjacent to but appears   separate from the soft tissue mass. While this likely represents   osteonecrosis, the possibility of osteomyelitis or tumor infiltration is   not excluded.    No cervical adenopathy.    --- End of Report --- CC: airway evaluation    HPI: 72yo female with PMHx memory issues presents to ED complaining of worsened facial swelling discomfort.  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  She reports that she has been following with both OMFS Dr. Etienne Cooper who performed a washout procedure in November 2023 and with ENT Dr. Urbano Andujar as well as her PCP.  She reports that over the last month has had increased swelling to the lower jaw area with increased pain which acutely progressed 2 days ago.  She was prescribed Biaxin which she has been taking for approximately 1 week without much relief.  Saw ENT today who recommended patient come to the emergency department for IV antibiotics. She has not seen her oral surgeon since procedure. ENT called for airway evaluation.  Patient reports that she has difficulty opening her mouth and eating secondary to swelling and pain. and she has lost several pounds.  She denies fevers, chills, difficulty breathing, chest pain, shortness of breath abdominal pain nausea or vomiting, stridor, hoarseness, dysphagia, or odynophagia. CT neck shows Peripherally enhancing necrotic mass within the anterior midline floor of   mouth. ?Abscess vs necrotic neoplasm. Bone erosion involving the left mandible which is adjacent to but appears separate from the soft tissue mass. While this likely represents   osteonecrosis, the possibility of osteomyelitis or tumor infiltration is not excluded.    PAST MEDICAL & SURGICAL HISTORY:  IBS (irritable bowel syndrome)    Hashimoto's thyroiditis    Osteoporosis    Allergies    No Known Allergies    Intolerances    MEDICATIONS  (STANDING):  morphine  - Injectable 2 milliGRAM(s) IV Push Once    MEDICATIONS  (PRN):    Social History: unknown tobacco use    Family history: Pt denies any significant family history     ROS:   ENT: all negative except as noted in HPI   CV: denies palpitations  Pulm: denies SOB, cough, hemoptysis  GI: denies change in apetite, indigestion, n/v  : denies pertinent urinary symptoms, urgency  Neuro: denies numbness/tingling, loss of sensation  Psych: denies anxiety  MS: denies muscle weakness, instability  Heme: denies easy bruising or bleeding  Endo: denies heat/cold intolerance, excessive sweating  Vascular: denies LE edema    Vital Signs Last 24 Hrs  T(C): 36.8 (10 Fermin 2024 15:53), Max: 36.9 (10 Fermin 2024 13:41)  T(F): 98.3 (10 Fermin 2024 15:53), Max: 98.4 (10 Fermin 2024 13:41)  HR: 99 (10 Fermin 2024 15:53) (98 - 99)  BP: 124/74 (10 Fermin 2024 15:53) (109/67 - 124/74)  BP(mean): 91 (10 Fermin 2024 15:53) (91 - 91)  RR: 18 (10 Fermin 2024 15:53) (18 - 18)  SpO2: 96% (10 Fermin 2024 15:53) (96% - 97%)    Parameters below as of 10 Fermin 2024 15:53  Patient On (Oxygen Delivery Method): room air                      11.7   10.06 )-----------( 196      ( 10 Fermin 2024 16:14 )             36.3    01-10    136  |  96  |  15  ----------------------------<  102<H>  3.5   |  25  |  0.75    Ca    9.5      10 Fermin 2024 16:14    TPro  7.1  /  Alb  4.0  /  TBili  1.0  /  DBili  x   /  AST  15  /  ALT  8<L>  /  AlkPhos  65  01-10   PT/INR - ( 10 Fermin 2024 16:14 )   PT: 12.5 sec;   INR: 1.20 ratio         PTT - ( 10 Fermin 2024 16:14 )  PTT:27.3 sec    PHYSICAL EXAM:  Gen: NAD  Skin: No rashes, bruises, or lesions  Head: Normocephalic, Atraumatic  Face: + submental swelling, middle of mandible TTP, No erythema, or fluctuance. Parotid glands soft without mass  Eyes: no scleral injection  Nose: Nares bilaterally patent, no discharge  Mouth: + trismus, sublingual swelling, tender to palpation on body of mandible. difficult to visualize posterior pharyx  Neck: Flat, supple, no lymphadenopathy, trachea midline, no masses  Lymphatic: No lymphadenopathy  Resp: breathing easily, no stridor  CV: no peripheral edema/cyanosis  GI: nondistended   Peripheral vascular: no JVD or edema  Neuro: facial nerve intact, no facial droop    ENT Procedure  Fiberoptic Indirect Laryngoscopy (Ambu scope used):  Reason for Laryngoscopy: airway eval  Patient was unable to cooperate with mirror.    Nasopharynx, oropharynx, and hypopharynx clear, no bleeding. Tongue base, posterior pharyngeal wall, vallecula, epiglottis, and subglottis appear normal. No erythema, edema, pooling of secretions, masses or lesions. Airway patent, no foreign body visualized. No glottic/supraglottic edema. True vocal cords, arytenoids, vestibular folds, ventricles, pyriform sinuses, and aryepiglottic folds appear normal bilaterally. Vocal cords mobile with good contact b/l.    IMAGING/ADDITIONAL STUDIES:   < from: CT Neck Soft Tissue w/ IV Cont (01.09.24 @ 08:41) >  EXAM: [id number] - CT NECK SOFT TISSUE IC  - ORDERED BY: URBANO ANDUJAR    PROCEDURE DATE:  01/09/2024       INTERPRETATION:  INDICATIONS:  Medication related osteonecrosis of jaw   now with lymphadenopathy    TECHNIQUE:   Axial images were obtained following the intravenous   administration of contrast material. Sagittal and coronal reformatted   images were obtained. 90 cc Omnipaque 350 were administered. 10 cc were   discarded.    COMPARISON EXAMINATION:  None.    FINDINGS:  AERODIGESTIVE TRACT:  There is an area of low density within the midline   anterior floor of mouth with an irregular enhancing wall measuring 2.5 x   1.5 x 2.5 cm. There is displacement of the lingual septum to the left as   well as infiltration of the genioglossus/geniohyoid musculature. The   superior margin of the mass and remaining oral cavity are degraded by   dental artifact. The lesion abuts and appears to involve the mylohyoid   but does not extend into the adjacent submental/submandibular space. Mild   fat plane haziness is seen below the mandible with thickening of the   platysma. The adjacent mandibular lingual cortex is intact however there   is an adjacent area to the left extending from the left medial incisor to   the molar region demonstrating mandibular bone erosion, lingual cortical   thinning/dehiscence and a linear lucency paralleling the lingual cortex   likely representing osteonecrosis with a bony sequestrum.  LYMPH NODES:  No adenopathy.  PAROTID GLANDS:  Portions are degraded bydental artifact. No abnormality   is seen.  SUBMANDIBULAR GLANDS:  Normal.  THYROID GLAND:  Scattered small nodules  VISUALIZED PARANASAL SINUSES:  Mild mucosal thickening  VISUALIZED TYMPANOMASTOID CAVITIES: Clear.  BONES:  Normal.  MISCELLANEOUS:  Fibrotic changes within the visualized upper lobes of the   lungs. Right sided calcified granuloma right lower lobe superior segment.    IMPRESSION:  Peripherally enhancing necrotic mass within the anterior midline floor of   mouth. While this may represent an abscess collection, the possibility of   a necrotic neoplasm is raised.    Bone erosion involving the left mandible which is adjacent to but appears   separate from the soft tissue mass. While this likely represents   osteonecrosis, the possibility of osteomyelitis or tumor infiltration is   not excluded.    No cervical adenopathy.    --- End of Report ---

## 2024-01-10 NOTE — ED PROVIDER NOTE - ATTENDING APP SHARED VISIT CONTRIBUTION OF CARE
Maurice Collado MD:  I personally made/approve the management plan and take responsibility for the patient management.    MDM: 73-year-old female with history of memory issues, osteoarthritis and osteoporosis, who presents with facial and mandibular swelling and discomfort.  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  Patient had washout by OMFS in November.  Patient also following with ENT and PCP, and has been treated with clarithromycin.  Patient was sent in by ENT for IV antibiotics.  Patient reports difficulty fully opening her mouth, and change in voice and difficulty eating with several pound weight loss.    On examination, patient with stable vitals, nontoxic-appearing, no acute distress. Cardiac examination RRR, lungs CTAB, abdomen soft and nontender, neurovascularly intact in all 4 extremities.  ENT examination shows (+) trismus, submandibular swelling and tenderness.  No stridor or drooling, no tripoding.    Will obtain labs to evaluate for hematologic disorder, metabolic derangements, hepatic and renal function, and screen for infection.  Will obtain CT of max face and neck soft tissue.  Will consult with ENT and OMFS.  Will likely require IV antibiotics.    My independent interpretation of the EKG shows:  Sinus rhythm with sinus arrhythmia with rate of 81 bpm, normal axis, T wave inversion in lead III and V3 to 4, no ST elevations or depressions.    Labs with no leukocytosis, electrolytes within normal range, no lactate elevation.    Seen by ENT who performed fiberoptic scope, airway patent.  They recommended Zosyn for IV antibiotics.  Awaiting OMFS recommendations. Maurice Collado MD:  I personally made/approve the management plan and take responsibility for the patient management.    MDM: 73-year-old female with history of memory issues, osteoarthritis and osteoporosis, who presents with facial and mandibular swelling and discomfort.  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  Patient had washout by OMFS in November.  Patient also following with ENT and PCP, and has been treated with clarithromycin.  Patient was sent in by ENT for IV antibiotics.  Patient reports difficulty fully opening her mouth, and change in voice and difficulty eating with several pound weight loss.    On examination, patient with stable vitals, nontoxic-appearing, no acute distress. Cardiac examination RRR, lungs CTAB, abdomen soft and nontender, neurovascularly intact in all 4 extremities.  ENT examination shows (+) trismus, submandibular swelling and tenderness.  No stridor or drooling, no tripoding.    Will obtain labs to evaluate for hematologic disorder, metabolic derangements, hepatic and renal function, and screen for infection.  Will obtain CT of max face and neck soft tissue.  Will consult with ENT and OMFS.  Will likely require IV antibiotics.    My independent interpretation of the EKG shows:  Sinus rhythm with sinus arrhythmia with rate of 81 bpm, normal axis, T wave inversion in lead III and V3 to 4, no ST elevations or depressions.    Labs with no leukocytosis, electrolytes within normal range, no lactate elevation.    Seen by ENT who performed fiberoptic scope, airway patent.  They recommended Zosyn for IV antibiotics.    CT reviewed, no interval change in peripherally enhancing necrotic mass in the right anterior floor of the mouth, likely necrotic neoplasm but cannot rule out superimposed infection/abscess.  There is also bony erosive and Swire changes involving the lingual cortex of the left mandibular body.    OMFS saw patient and recommended no emergent surgical intervention at this time, recommended IR consultation in a.m. and admission to medicine.    The patient will need to be admitted to the hospital for continued evaluation and management.  Discussed with the accepting physician regarding the initial presentation, diagnostic studies, treatments given in the ED, and current plan of care.  I, Dr. Maurice Collado, spoke directly to the admitting attending physician, Dr. Bhavesh Joshi, who accepted the patient to his service.  The patient was accepted by and endorsed to the medicine team and followed by consultants ENT and OMFS.

## 2024-01-10 NOTE — H&P ADULT - NSHPPHYSICALEXAM_GEN_ALL_CORE
T(C): 36.7 (01-10-24 @ 23:05), Max: 36.9 (01-10-24 @ 13:41)  HR: 70 (01-10-24 @ 23:05) (70 - 99)  BP: 123/82 (01-10-24 @ 23:05) (109/67 - 124/74)  RR: 18 (01-10-24 @ 23:05) (16 - 18)  SpO2: 98% (01-10-24 @ 23:05) (96% - 98%)  GENERAL: NAD, lying in bed comfortably  EYES: EOMI, PERRLA; conjunctiva and sclera clear  ENMT: swelling/tenderness to palpation of submental region; floor of mouth firm and raised and tender  NECK: Supple, no palpable masses; no JVD  RESPIRATORY: Normal respiratory effort; lungs are clear to auscultation bilaterally  CARDIOVASCULAR: Regular rate and rhythm, normal S1 and S2, no murmur/rub/gallop; No lower extremity edema; Peripheral pulses are 2+ bilaterally  ABDOMEN: Nontender to palpation, normoactive bowel sounds, no rebound/guarding   MUSCULOSKELETAL:  no joint swelling or tenderness to palpation  PSYCH: A+O to person, place, and time; affect appropriate  NEUROLOGY: CN 2-12 are intact and symmetric; no gross motor or sensory deficits   SKIN: No rashes; no palpable lesions

## 2024-01-10 NOTE — CONSULT NOTE ADULT - PROBLEM SELECTOR RECOMMENDATION 9
- IV zosyn for now   - Rec OMFS  - rec ID consult   - - rec to continue IV zosyn for now   - Rec OMFS for further evaluation   - rec ID consult for further recommendation of antibiotics.   - If any new concerns or issues, please feel free to call [phone]. - rec to continue IV zosyn for now   - Rec OMFS for further evaluation   - rec ID consult for further recommendation of antibiotics.   - If any new concerns or issues, please feel free to call [phone].

## 2024-01-10 NOTE — CONSULT NOTE ADULT - ASSESSMENT
Final recs pending 73 F with pmhx memory issues and osteoporosis presents ED complaining of worsening facial swelling and pain. Hypodense lesion with irregular rind of enhancement centered in the right aspect of the floor of mouth measuring approximately 3.3 x 2.4 x 2.6 cm. Clinical presentation c/w abscess v necrotic mass. Given normal WBC, febrile low concern for acute infection. Given pts clinical exam w/ firm floor of mouth, neck mass and weight loss concern for neoplastic process.    - Recommend IR consultation for core biopsy v aspiration  - C/w abx  - Multimodal pain control  - OMFS to follow closely

## 2024-01-10 NOTE — H&P ADULT - PROBLEM SELECTOR PLAN 2
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  imaging shows "bony erosive and sclerotic changes involving the lingual cortex of the left mandibular body, likely due to provided history of osteonecrosis."  omfs and ent consulted  by er  supportive mgmt w analgesics  slp eval in am  omfs/ent follow up in am

## 2024-01-10 NOTE — H&P ADULT - HISTORY OF PRESENT ILLNESS
74yo 53kg f w pmh mci/dementia (alzheimer), anx/dep/insomnia, gerd/lprd, osteoporosis, mronj, fibromyalgia, ibs-c c/b diverticulosis + hemorrhoids, p/w facial swelling a/w orofacial pain; She reports that over the last month she has had increased swelling to the lower jaw area with pain which acutely progressed 2 days ago.  She was prescribed Biaxin which she has been taking for approximately 1 week without relief.  Went to outpatient ENT, recommended patient  to go the er. so patient presents to [facility] er for further evaluation. in er, found to have necrotic mass in the floor of mouth; admit to medicine for further mgmt.  72yo 53kg f w pmh mci/dementia (alzheimer), anx/dep/insomnia, gerd/lprd, osteoporosis, mronj, fibromyalgia, ibs-c c/b diverticulosis + hemorrhoids, p/w facial swelling a/w orofacial pain; She reports that over the last month she has had increased swelling to the lower jaw area with pain which acutely progressed 2 days ago.  She was prescribed Biaxin which she has been taking for approximately 1 week without relief.  Went to outpatient ENT, recommended patient  to go the er. so patient presents to [facility] er for further evaluation. in er, found to have necrotic mass in the floor of mouth; admit to medicine for further mgmt.

## 2024-01-10 NOTE — H&P ADULT - NSHPREVIEWOFSYSTEMS_GEN_ALL_CORE
CONSTITUTIONAL: No fever. no weakness, +weight loss  ENMT:  +orofacial pain, +facial swelling  RESPIRATORY: No cough, wheezing, chills or hemoptysis; No shortness of breath  CARDIOVASCULAR: No chest pain, palpitations, dizziness, or leg swelling  GASTROINTESTINAL: No abdominal or epigastric pain. No nausea, vomiting, or hematemesis; No diarrhea or constipation. No melena or hematochezia.  GENITOURINARY: No dysuria or incontinence  NEUROLOGICAL: No headaches, memory loss, loss of strength, numbness, or tremors  SKIN: No rashes,  No hives or eczema  ENDOCRINE: No heat or cold intolerance; No hair loss  MUSCULOSKELETAL: No joint pain or swelling; No muscle, back, or extremity pain  PSYCHIATRIC: No depression, anxiety, mood swings, or difficulty sleeping  HEME/LYMPH: No easy bruising, or bleeding gums; no enlarged LN

## 2024-01-11 LAB
A1C WITH ESTIMATED AVERAGE GLUCOSE RESULT: 5.9 % — HIGH (ref 4–5.6)
A1C WITH ESTIMATED AVERAGE GLUCOSE RESULT: 5.9 % — HIGH (ref 4–5.6)
ALBUMIN SERPL ELPH-MCNC: 3.6 G/DL — SIGNIFICANT CHANGE UP (ref 3.3–5)
ALBUMIN SERPL ELPH-MCNC: 3.6 G/DL — SIGNIFICANT CHANGE UP (ref 3.3–5)
ALP SERPL-CCNC: 60 U/L — SIGNIFICANT CHANGE UP (ref 40–120)
ALP SERPL-CCNC: 60 U/L — SIGNIFICANT CHANGE UP (ref 40–120)
ALT FLD-CCNC: 7 U/L — LOW (ref 10–45)
ALT FLD-CCNC: 7 U/L — LOW (ref 10–45)
ANION GAP SERPL CALC-SCNC: 15 MMOL/L — SIGNIFICANT CHANGE UP (ref 5–17)
ANION GAP SERPL CALC-SCNC: 15 MMOL/L — SIGNIFICANT CHANGE UP (ref 5–17)
APTT BLD: 25.5 SEC — SIGNIFICANT CHANGE UP (ref 24.5–35.6)
APTT BLD: 25.5 SEC — SIGNIFICANT CHANGE UP (ref 24.5–35.6)
AST SERPL-CCNC: 16 U/L — SIGNIFICANT CHANGE UP (ref 10–40)
AST SERPL-CCNC: 16 U/L — SIGNIFICANT CHANGE UP (ref 10–40)
BASOPHILS # BLD AUTO: 0.05 K/UL — SIGNIFICANT CHANGE UP (ref 0–0.2)
BASOPHILS # BLD AUTO: 0.05 K/UL — SIGNIFICANT CHANGE UP (ref 0–0.2)
BASOPHILS NFR BLD AUTO: 0.6 % — SIGNIFICANT CHANGE UP (ref 0–2)
BASOPHILS NFR BLD AUTO: 0.6 % — SIGNIFICANT CHANGE UP (ref 0–2)
BILIRUB SERPL-MCNC: 0.9 MG/DL — SIGNIFICANT CHANGE UP (ref 0.2–1.2)
BILIRUB SERPL-MCNC: 0.9 MG/DL — SIGNIFICANT CHANGE UP (ref 0.2–1.2)
BUN SERPL-MCNC: 16 MG/DL — SIGNIFICANT CHANGE UP (ref 7–23)
BUN SERPL-MCNC: 16 MG/DL — SIGNIFICANT CHANGE UP (ref 7–23)
CALCIUM SERPL-MCNC: 8.7 MG/DL — SIGNIFICANT CHANGE UP (ref 8.4–10.5)
CALCIUM SERPL-MCNC: 8.7 MG/DL — SIGNIFICANT CHANGE UP (ref 8.4–10.5)
CHLORIDE SERPL-SCNC: 99 MMOL/L — SIGNIFICANT CHANGE UP (ref 96–108)
CHLORIDE SERPL-SCNC: 99 MMOL/L — SIGNIFICANT CHANGE UP (ref 96–108)
CHOLEST SERPL-MCNC: 165 MG/DL — SIGNIFICANT CHANGE UP
CHOLEST SERPL-MCNC: 165 MG/DL — SIGNIFICANT CHANGE UP
CO2 SERPL-SCNC: 23 MMOL/L — SIGNIFICANT CHANGE UP (ref 22–31)
CO2 SERPL-SCNC: 23 MMOL/L — SIGNIFICANT CHANGE UP (ref 22–31)
CREAT SERPL-MCNC: 0.65 MG/DL — SIGNIFICANT CHANGE UP (ref 0.5–1.3)
CREAT SERPL-MCNC: 0.65 MG/DL — SIGNIFICANT CHANGE UP (ref 0.5–1.3)
EGFR: 93 ML/MIN/1.73M2 — SIGNIFICANT CHANGE UP
EGFR: 93 ML/MIN/1.73M2 — SIGNIFICANT CHANGE UP
EOSINOPHIL # BLD AUTO: 0.05 K/UL — SIGNIFICANT CHANGE UP (ref 0–0.5)
EOSINOPHIL # BLD AUTO: 0.05 K/UL — SIGNIFICANT CHANGE UP (ref 0–0.5)
EOSINOPHIL NFR BLD AUTO: 0.6 % — SIGNIFICANT CHANGE UP (ref 0–6)
EOSINOPHIL NFR BLD AUTO: 0.6 % — SIGNIFICANT CHANGE UP (ref 0–6)
ESTIMATED AVERAGE GLUCOSE: 123 MG/DL — HIGH (ref 68–114)
ESTIMATED AVERAGE GLUCOSE: 123 MG/DL — HIGH (ref 68–114)
GLUCOSE SERPL-MCNC: 85 MG/DL — SIGNIFICANT CHANGE UP (ref 70–99)
GLUCOSE SERPL-MCNC: 85 MG/DL — SIGNIFICANT CHANGE UP (ref 70–99)
HCT VFR BLD CALC: 36.4 % — SIGNIFICANT CHANGE UP (ref 34.5–45)
HCT VFR BLD CALC: 36.4 % — SIGNIFICANT CHANGE UP (ref 34.5–45)
HCV AB S/CO SERPL IA: 0.12 S/CO — SIGNIFICANT CHANGE UP (ref 0–0.99)
HCV AB S/CO SERPL IA: 0.12 S/CO — SIGNIFICANT CHANGE UP (ref 0–0.99)
HCV AB SERPL-IMP: SIGNIFICANT CHANGE UP
HCV AB SERPL-IMP: SIGNIFICANT CHANGE UP
HDLC SERPL-MCNC: 74 MG/DL — SIGNIFICANT CHANGE UP
HDLC SERPL-MCNC: 74 MG/DL — SIGNIFICANT CHANGE UP
HGB BLD-MCNC: 11.6 G/DL — SIGNIFICANT CHANGE UP (ref 11.5–15.5)
HGB BLD-MCNC: 11.6 G/DL — SIGNIFICANT CHANGE UP (ref 11.5–15.5)
IMM GRANULOCYTES NFR BLD AUTO: 0.5 % — SIGNIFICANT CHANGE UP (ref 0–0.9)
IMM GRANULOCYTES NFR BLD AUTO: 0.5 % — SIGNIFICANT CHANGE UP (ref 0–0.9)
INR BLD: 1.19 RATIO — HIGH (ref 0.85–1.18)
INR BLD: 1.19 RATIO — HIGH (ref 0.85–1.18)
LIPID PNL WITH DIRECT LDL SERPL: 74 MG/DL — SIGNIFICANT CHANGE UP
LIPID PNL WITH DIRECT LDL SERPL: 74 MG/DL — SIGNIFICANT CHANGE UP
LYMPHOCYTES # BLD AUTO: 1.27 K/UL — SIGNIFICANT CHANGE UP (ref 1–3.3)
LYMPHOCYTES # BLD AUTO: 1.27 K/UL — SIGNIFICANT CHANGE UP (ref 1–3.3)
LYMPHOCYTES # BLD AUTO: 15 % — SIGNIFICANT CHANGE UP (ref 13–44)
LYMPHOCYTES # BLD AUTO: 15 % — SIGNIFICANT CHANGE UP (ref 13–44)
MAGNESIUM SERPL-MCNC: 1.9 MG/DL — SIGNIFICANT CHANGE UP (ref 1.6–2.6)
MAGNESIUM SERPL-MCNC: 1.9 MG/DL — SIGNIFICANT CHANGE UP (ref 1.6–2.6)
MCHC RBC-ENTMCNC: 27.1 PG — SIGNIFICANT CHANGE UP (ref 27–34)
MCHC RBC-ENTMCNC: 27.1 PG — SIGNIFICANT CHANGE UP (ref 27–34)
MCHC RBC-ENTMCNC: 31.9 GM/DL — LOW (ref 32–36)
MCHC RBC-ENTMCNC: 31.9 GM/DL — LOW (ref 32–36)
MCV RBC AUTO: 85 FL — SIGNIFICANT CHANGE UP (ref 80–100)
MCV RBC AUTO: 85 FL — SIGNIFICANT CHANGE UP (ref 80–100)
MONOCYTES # BLD AUTO: 0.69 K/UL — SIGNIFICANT CHANGE UP (ref 0–0.9)
MONOCYTES # BLD AUTO: 0.69 K/UL — SIGNIFICANT CHANGE UP (ref 0–0.9)
MONOCYTES NFR BLD AUTO: 8.1 % — SIGNIFICANT CHANGE UP (ref 2–14)
MONOCYTES NFR BLD AUTO: 8.1 % — SIGNIFICANT CHANGE UP (ref 2–14)
NEUTROPHILS # BLD AUTO: 6.39 K/UL — SIGNIFICANT CHANGE UP (ref 1.8–7.4)
NEUTROPHILS # BLD AUTO: 6.39 K/UL — SIGNIFICANT CHANGE UP (ref 1.8–7.4)
NEUTROPHILS NFR BLD AUTO: 75.2 % — SIGNIFICANT CHANGE UP (ref 43–77)
NEUTROPHILS NFR BLD AUTO: 75.2 % — SIGNIFICANT CHANGE UP (ref 43–77)
NON HDL CHOLESTEROL: 91 MG/DL — SIGNIFICANT CHANGE UP
NON HDL CHOLESTEROL: 91 MG/DL — SIGNIFICANT CHANGE UP
NRBC # BLD: 0 /100 WBCS — SIGNIFICANT CHANGE UP (ref 0–0)
NRBC # BLD: 0 /100 WBCS — SIGNIFICANT CHANGE UP (ref 0–0)
PHOSPHATE SERPL-MCNC: 1.8 MG/DL — LOW (ref 2.5–4.5)
PHOSPHATE SERPL-MCNC: 1.8 MG/DL — LOW (ref 2.5–4.5)
PLATELET # BLD AUTO: 161 K/UL — SIGNIFICANT CHANGE UP (ref 150–400)
PLATELET # BLD AUTO: 161 K/UL — SIGNIFICANT CHANGE UP (ref 150–400)
POTASSIUM SERPL-MCNC: 3.5 MMOL/L — SIGNIFICANT CHANGE UP (ref 3.5–5.3)
POTASSIUM SERPL-MCNC: 3.5 MMOL/L — SIGNIFICANT CHANGE UP (ref 3.5–5.3)
POTASSIUM SERPL-SCNC: 3.5 MMOL/L — SIGNIFICANT CHANGE UP (ref 3.5–5.3)
POTASSIUM SERPL-SCNC: 3.5 MMOL/L — SIGNIFICANT CHANGE UP (ref 3.5–5.3)
PROT SERPL-MCNC: 6.5 G/DL — SIGNIFICANT CHANGE UP (ref 6–8.3)
PROT SERPL-MCNC: 6.5 G/DL — SIGNIFICANT CHANGE UP (ref 6–8.3)
PROTHROM AB SERPL-ACNC: 12.4 SEC — SIGNIFICANT CHANGE UP (ref 9.5–13)
PROTHROM AB SERPL-ACNC: 12.4 SEC — SIGNIFICANT CHANGE UP (ref 9.5–13)
RBC # BLD: 4.28 M/UL — SIGNIFICANT CHANGE UP (ref 3.8–5.2)
RBC # BLD: 4.28 M/UL — SIGNIFICANT CHANGE UP (ref 3.8–5.2)
RBC # FLD: 16.8 % — HIGH (ref 10.3–14.5)
RBC # FLD: 16.8 % — HIGH (ref 10.3–14.5)
SODIUM SERPL-SCNC: 137 MMOL/L — SIGNIFICANT CHANGE UP (ref 135–145)
SODIUM SERPL-SCNC: 137 MMOL/L — SIGNIFICANT CHANGE UP (ref 135–145)
TRIGL SERPL-MCNC: 97 MG/DL — SIGNIFICANT CHANGE UP
TRIGL SERPL-MCNC: 97 MG/DL — SIGNIFICANT CHANGE UP
TSH SERPL-MCNC: 1.71 UIU/ML — SIGNIFICANT CHANGE UP (ref 0.27–4.2)
TSH SERPL-MCNC: 1.71 UIU/ML — SIGNIFICANT CHANGE UP (ref 0.27–4.2)
WBC # BLD: 8.49 K/UL — SIGNIFICANT CHANGE UP (ref 3.8–10.5)
WBC # BLD: 8.49 K/UL — SIGNIFICANT CHANGE UP (ref 3.8–10.5)
WBC # FLD AUTO: 8.49 K/UL — SIGNIFICANT CHANGE UP (ref 3.8–10.5)
WBC # FLD AUTO: 8.49 K/UL — SIGNIFICANT CHANGE UP (ref 3.8–10.5)

## 2024-01-11 PROCEDURE — 99233 SBSQ HOSP IP/OBS HIGH 50: CPT

## 2024-01-11 RX ORDER — GABAPENTIN 400 MG/1
1 CAPSULE ORAL
Refills: 0 | DISCHARGE

## 2024-01-11 RX ORDER — KETOROLAC TROMETHAMINE 30 MG/ML
15 SYRINGE (ML) INJECTION EVERY 6 HOURS
Refills: 0 | Status: DISCONTINUED | OUTPATIENT
Start: 2024-01-11 | End: 2024-01-12

## 2024-01-11 RX ORDER — KETOROLAC TROMETHAMINE 30 MG/ML
15 SYRINGE (ML) INJECTION EVERY 6 HOURS
Refills: 0 | Status: DISCONTINUED | OUTPATIENT
Start: 2024-01-11 | End: 2024-01-11

## 2024-01-11 RX ORDER — OMEPRAZOLE 10 MG/1
0 CAPSULE, DELAYED RELEASE ORAL
Qty: 0 | Refills: 0 | DISCHARGE

## 2024-01-11 RX ORDER — MEMANTINE HYDROCHLORIDE 10 MG/1
10 TABLET ORAL
Refills: 0 | Status: DISCONTINUED | OUTPATIENT
Start: 2024-01-11 | End: 2024-01-15

## 2024-01-11 RX ORDER — MELOXICAM 15 MG/1
0 TABLET ORAL
Qty: 0 | Refills: 0 | DISCHARGE

## 2024-01-11 RX ORDER — CHLORHEXIDINE GLUCONATE 213 G/1000ML
15 SOLUTION TOPICAL
Refills: 0 | Status: DISCONTINUED | OUTPATIENT
Start: 2024-01-11 | End: 2024-01-15

## 2024-01-11 RX ORDER — ALENDRONATE SODIUM 70 MG/1
0 TABLET ORAL
Qty: 0 | Refills: 0 | DISCHARGE

## 2024-01-11 RX ORDER — GABAPENTIN 400 MG/1
100 CAPSULE ORAL THREE TIMES A DAY
Refills: 0 | Status: DISCONTINUED | OUTPATIENT
Start: 2024-01-11 | End: 2024-01-15

## 2024-01-11 RX ORDER — MEMANTINE HYDROCHLORIDE 10 MG/1
1 TABLET ORAL
Refills: 0 | DISCHARGE

## 2024-01-11 RX ORDER — KETOROLAC TROMETHAMINE 30 MG/ML
15 SYRINGE (ML) INJECTION ONCE
Refills: 0 | Status: DISCONTINUED | OUTPATIENT
Start: 2024-01-11 | End: 2024-01-11

## 2024-01-11 RX ORDER — ACETAMINOPHEN 500 MG
650 TABLET ORAL EVERY 6 HOURS
Refills: 0 | Status: DISCONTINUED | OUTPATIENT
Start: 2024-01-11 | End: 2024-01-11

## 2024-01-11 RX ORDER — OXYCODONE AND ACETAMINOPHEN 5; 325 MG/1; MG/1
1 TABLET ORAL EVERY 4 HOURS
Refills: 0 | Status: DISCONTINUED | OUTPATIENT
Start: 2024-01-11 | End: 2024-01-11

## 2024-01-11 RX ORDER — PANTOPRAZOLE SODIUM 20 MG/1
40 TABLET, DELAYED RELEASE ORAL
Refills: 0 | Status: DISCONTINUED | OUTPATIENT
Start: 2024-01-11 | End: 2024-01-15

## 2024-01-11 RX ORDER — MORPHINE SULFATE 50 MG/1
4 CAPSULE, EXTENDED RELEASE ORAL ONCE
Refills: 0 | Status: DISCONTINUED | OUTPATIENT
Start: 2024-01-11 | End: 2024-01-11

## 2024-01-11 RX ORDER — TRAZODONE HCL 50 MG
0 TABLET ORAL
Qty: 0 | Refills: 0 | DISCHARGE

## 2024-01-11 RX ORDER — DONEPEZIL HYDROCHLORIDE 10 MG/1
0 TABLET, FILM COATED ORAL
Qty: 0 | Refills: 0 | DISCHARGE

## 2024-01-11 RX ORDER — MORPHINE SULFATE 50 MG/1
4 CAPSULE, EXTENDED RELEASE ORAL EVERY 4 HOURS
Refills: 0 | Status: DISCONTINUED | OUTPATIENT
Start: 2024-01-11 | End: 2024-01-11

## 2024-01-11 RX ORDER — LIPASE/PROTEASE/AMYLASE 16-48-48K
0 CAPSULE,DELAYED RELEASE (ENTERIC COATED) ORAL
Qty: 0 | Refills: 0 | DISCHARGE

## 2024-01-11 RX ORDER — MORPHINE SULFATE 50 MG/1
6 CAPSULE, EXTENDED RELEASE ORAL EVERY 4 HOURS
Refills: 0 | Status: DISCONTINUED | OUTPATIENT
Start: 2024-01-11 | End: 2024-01-11

## 2024-01-11 RX ORDER — ATORVASTATIN CALCIUM 80 MG/1
1 TABLET, FILM COATED ORAL
Refills: 0 | DISCHARGE

## 2024-01-11 RX ORDER — OMEPRAZOLE 10 MG/1
1 CAPSULE, DELAYED RELEASE ORAL
Refills: 0 | DISCHARGE

## 2024-01-11 RX ORDER — SODIUM CHLORIDE 9 MG/ML
1000 INJECTION, SOLUTION INTRAVENOUS
Refills: 0 | Status: DISCONTINUED | OUTPATIENT
Start: 2024-01-11 | End: 2024-01-12

## 2024-01-11 RX ORDER — MORPHINE SULFATE 50 MG/1
4 CAPSULE, EXTENDED RELEASE ORAL EVERY 4 HOURS
Refills: 0 | Status: DISCONTINUED | OUTPATIENT
Start: 2024-01-11 | End: 2024-01-12

## 2024-01-11 RX ADMIN — MEMANTINE HYDROCHLORIDE 10 MILLIGRAM(S): 10 TABLET ORAL at 04:47

## 2024-01-11 RX ADMIN — MORPHINE SULFATE 2 MILLIGRAM(S): 50 CAPSULE, EXTENDED RELEASE ORAL at 04:46

## 2024-01-11 RX ADMIN — SODIUM CHLORIDE 80 MILLILITER(S): 9 INJECTION, SOLUTION INTRAVENOUS at 21:54

## 2024-01-11 RX ADMIN — Medication 1000 MILLIGRAM(S): at 06:14

## 2024-01-11 RX ADMIN — Medication 15 MILLIGRAM(S): at 13:30

## 2024-01-11 RX ADMIN — GABAPENTIN 100 MILLIGRAM(S): 400 CAPSULE ORAL at 22:30

## 2024-01-11 RX ADMIN — SODIUM CHLORIDE 80 MILLILITER(S): 9 INJECTION, SOLUTION INTRAVENOUS at 13:05

## 2024-01-11 RX ADMIN — MORPHINE SULFATE 2 MILLIGRAM(S): 50 CAPSULE, EXTENDED RELEASE ORAL at 10:11

## 2024-01-11 RX ADMIN — MORPHINE SULFATE 4 MILLIGRAM(S): 50 CAPSULE, EXTENDED RELEASE ORAL at 23:02

## 2024-01-11 RX ADMIN — MORPHINE SULFATE 2 MILLIGRAM(S): 50 CAPSULE, EXTENDED RELEASE ORAL at 06:14

## 2024-01-11 RX ADMIN — PIPERACILLIN AND TAZOBACTAM 25 GRAM(S): 4; .5 INJECTION, POWDER, LYOPHILIZED, FOR SOLUTION INTRAVENOUS at 21:54

## 2024-01-11 RX ADMIN — Medication 3 MILLIGRAM(S): at 22:29

## 2024-01-11 RX ADMIN — Medication 15 MILLIGRAM(S): at 19:30

## 2024-01-11 RX ADMIN — PANTOPRAZOLE SODIUM 40 MILLIGRAM(S): 20 TABLET, DELAYED RELEASE ORAL at 04:47

## 2024-01-11 RX ADMIN — MORPHINE SULFATE 2 MILLIGRAM(S): 50 CAPSULE, EXTENDED RELEASE ORAL at 09:41

## 2024-01-11 RX ADMIN — MORPHINE SULFATE 4 MILLIGRAM(S): 50 CAPSULE, EXTENDED RELEASE ORAL at 23:32

## 2024-01-11 RX ADMIN — Medication 15 MILLIGRAM(S): at 06:15

## 2024-01-11 RX ADMIN — PIPERACILLIN AND TAZOBACTAM 25 GRAM(S): 4; .5 INJECTION, POWDER, LYOPHILIZED, FOR SOLUTION INTRAVENOUS at 13:04

## 2024-01-11 RX ADMIN — PIPERACILLIN AND TAZOBACTAM 25 GRAM(S): 4; .5 INJECTION, POWDER, LYOPHILIZED, FOR SOLUTION INTRAVENOUS at 04:55

## 2024-01-11 RX ADMIN — MEMANTINE HYDROCHLORIDE 10 MILLIGRAM(S): 10 TABLET ORAL at 17:03

## 2024-01-11 RX ADMIN — Medication 15 MILLIGRAM(S): at 13:04

## 2024-01-11 RX ADMIN — SODIUM CHLORIDE 80 MILLILITER(S): 9 INJECTION, SOLUTION INTRAVENOUS at 10:28

## 2024-01-11 RX ADMIN — GABAPENTIN 100 MILLIGRAM(S): 400 CAPSULE ORAL at 04:47

## 2024-01-11 NOTE — PROGRESS NOTE ADULT - PROBLEM SELECTOR PLAN 1
afebrile, no leukocytosis, hds  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, but superimposed infection and abscess formation is not excluded."  omfs and ent consulted  by er  broad spec empirical abx therapy w zosyn  supportive mgmt w analgesics  ir consult for biopsy v aspiration in am   omfs follow up in am 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, but superimposed infection and abscess formation is not excluded."  OMFS and ENT are folllowing   PT needs a biopsy +/- aspiration --> IR recommends ENT --> ENT recommends OMFS  c/w pain control  puree diet

## 2024-01-11 NOTE — PROGRESS NOTE ADULT - SUBJECTIVE AND OBJECTIVE BOX
Andre Reyes, M.D.  Pager: [phone]  Office: [phone]    Patient is a 73y old  Female who presents with a chief complaint of facial swelling (11 Jan 2024 08:52)          SUBJECTIVE / OVERNIGHT EVENTS:    No acute overnight events.    ROS: ( - ) Fever, ( - )Chills,  ( - )Nausea/Vomiting, ( - ) Cough, ( - )Shortness of breath, ( - )Chest Pain    MEDICATIONS  (STANDING):  dextrose 5% + sodium chloride 0.45%. 1000 milliLiter(s) (80 mL/Hr) IV Continuous <Continuous>  gabapentin 100 milliGRAM(s) Oral three times a day  memantine 10 milliGRAM(s) Oral two times a day  naloxone Injectable 0.4 milliGRAM(s) IV Push once  pantoprazole    Tablet 40 milliGRAM(s) Oral before breakfast  piperacillin/tazobactam IVPB.. 3.375 Gram(s) IV Intermittent every 8 hours  polyethylene glycol 3350 17 Gram(s) Oral daily  senna 2 Tablet(s) Oral at bedtime    MEDICATIONS  (PRN):  acetaminophen     Tablet .. 650 milliGRAM(s) Oral every 6 hours PRN Temp greater or equal to 38C (100.4F), Mild Pain (1 - 3)  aluminum hydroxide/magnesium hydroxide/simethicone Suspension 30 milliLiter(s) Oral every 4 hours PRN Dyspepsia  bisacodyl 5 milliGRAM(s) Oral daily PRN Constipation  melatonin 3 milliGRAM(s) Oral at bedtime PRN Insomnia  morphine  - Injectable 2 milliGRAM(s) IV Push every 4 hours PRN Moderate Pain (4 - 6)  morphine  - Injectable 4 milliGRAM(s) IV Push every 4 hours PRN Severe Pain (7 - 10)  ondansetron Injectable 4 milliGRAM(s) IV Push every 8 hours PRN Nausea and/or Vomiting          T(C): 37.1 (01-11 @ 09:00), Max: 37.1 (01-11 @ 09:00)   HR: 86   BP: 111/71   RR: 18   SpO2: 96%    PHYSICAL EXAM:    CONSTITUTIONAL: NAD, well-developed, well-groomed  EYES: PERRLA; conjunctiva and sclera clear  ENMT: Moist oral mucosa, no pharyngeal injection or exudates; normal dentition  NECK: Supple, no palpable masses; no thyromegaly  RESPIRATORY: Normal respiratory effort; lungs are clear to auscultation bilaterally  CARDIOVASCULAR: Regular rate and rhythm, normal S1 and S2, no murmur/rub/gallop; No lower extremity edema; Peripheral pulses are 2+ bilaterally  ABDOMEN: Nontender to palpation, normoactive bowel sounds, no rebound/guarding; No hepatosplenomegaly  MUSCULOSKELETAL:  no clubbing or cyanosis of digits; no joint swelling or tenderness to palpation  PSYCH: A+O to person, place, and time; affect appropriate  NEUROLOGY: CN 2-12 are intact and symmetric; no gross sensory deficits   SKIN: No rashes; no palpable lesions      LABS:                        11.6   8.49  )-----------( 161      ( 11 Jan 2024 05:55 )             36.4      01-11    137  |  99  |  16  ----------------------------<  85  3.5   |  23  |  0.65    Ca    8.7      11 Jan 2024 05:55  Phos  1.8     01-11  Mg     1.9     01-11    TPro  6.5  /  Alb  3.6  /  TBili  0.9  /  DBili  x   /  AST  16  /  ALT  7<L>  /  AlkPhos  60  01-11       CAPILLARY BLOOD GLUCOSE          RADIOLOGY & ADDITIONAL TESTS:    Imaging Personally Reviewed:  Consultant(s) Notes Reviewed:    Care Discussed with Consultants/Other Providers:   Andre Reyes, M.D.  Pager: [phone]  Office: [phone]    Patient is a 73y old  Female who presents with a chief complaint of facial swelling (11 Jan 2024 08:52)          SUBJECTIVE / OVERNIGHT EVENTS:    No acute overnight events.    ROS: ( - ) Fever, ( - )Chills,  ( - )Nausea/Vomiting, ( - ) Cough, ( - )Shortness of breath, ( - )Chest Pain    MEDICATIONS  (STANDING):  dextrose 5% + sodium chloride 0.45%. 1000 milliLiter(s) (80 mL/Hr) IV Continuous <Continuous>  gabapentin 100 milliGRAM(s) Oral three times a day  memantine 10 milliGRAM(s) Oral two times a day  naloxone Injectable 0.4 milliGRAM(s) IV Push once  pantoprazole    Tablet 40 milliGRAM(s) Oral before breakfast  piperacillin/tazobactam IVPB.. 3.375 Gram(s) IV Intermittent every 8 hours  polyethylene glycol 3350 17 Gram(s) Oral daily  senna 2 Tablet(s) Oral at bedtime    MEDICATIONS  (PRN):  acetaminophen     Tablet .. 650 milliGRAM(s) Oral every 6 hours PRN Temp greater or equal to 38C (100.4F), Mild Pain (1 - 3)  aluminum hydroxide/magnesium hydroxide/simethicone Suspension 30 milliLiter(s) Oral every 4 hours PRN Dyspepsia  bisacodyl 5 milliGRAM(s) Oral daily PRN Constipation  melatonin 3 milliGRAM(s) Oral at bedtime PRN Insomnia  morphine  - Injectable 2 milliGRAM(s) IV Push every 4 hours PRN Moderate Pain (4 - 6)  morphine  - Injectable 4 milliGRAM(s) IV Push every 4 hours PRN Severe Pain (7 - 10)  ondansetron Injectable 4 milliGRAM(s) IV Push every 8 hours PRN Nausea and/or Vomiting          T(C): 37.1 (01-11 @ 09:00), Max: 37.1 (01-11 @ 09:00)   HR: 86   BP: 111/71   RR: 18   SpO2: 96%    PHYSICAL EXAM:    CONSTITUTIONAL: NAD, well-developed, well-groomed  EYES: PERRLA; conjunctiva and sclera clear  ENMT: Moist oral mucosa, no pharyngeal injection or exudates; normal dentition  NECK: Supple, no palpable masses; no thyromegaly  RESPIRATORY: Normal respiratory effort; lungs are clear to auscultation bilaterally  CARDIOVASCULAR: Regular rate and rhythm, normal S1 and S2, no murmur/rub/gallop; No lower extremity edema; Peripheral pulses are 2+ bilaterally  ABDOMEN: Nontender to palpation, normoactive bowel sounds, no rebound/guarding; No hepatosplenomegaly  MUSCULOSKELETAL:  no clubbing or cyanosis of digits; no joint swelling or tenderness to palpation  PSYCH: A+O to person, place, and time; affect appropriate  NEUROLOGY: CN 2-12 are intact and symmetric; no gross sensory deficits   SKIN: No rashes; no palpable lesions      LABS:                        11.6   8.49  )-----------( 161      ( 11 Jan 2024 05:55 )             36.4      01-11    137  |  99  |  16  ----------------------------<  85  3.5   |  23  |  0.65    Ca    8.7      11 Jan 2024 05:55  Phos  1.8     01-11  Mg     1.9     01-11    TPro  6.5  /  Alb  3.6  /  TBili  0.9  /  DBili  x   /  AST  16  /  ALT  7<L>  /  AlkPhos  60  01-11       CAPILLARY BLOOD GLUCOSE          RADIOLOGY & ADDITIONAL TESTS:    Imaging Personally Reviewed:  Consultant(s) Notes Reviewed:    Care Discussed with Consultants/Other Providers:   Andre Reyes, M.D.  Pager: [phone]  Office: [phone]    Patient is a 73y old  Female who presents with a chief complaint of facial swelling (11 Jan 2024 08:52)          SUBJECTIVE / OVERNIGHT EVENTS:    No acute overnight events.  c/o difficulty swallowing, mandibular and scalp pain.    ROS: ( - ) Fever, ( - )Chills,  ( - )Nausea/Vomiting, ( - ) Cough, ( - )Shortness of breath, ( - )Chest Pain    MEDICATIONS  (STANDING):  dextrose 5% + sodium chloride 0.45%. 1000 milliLiter(s) (80 mL/Hr) IV Continuous <Continuous>  gabapentin 100 milliGRAM(s) Oral three times a day  memantine 10 milliGRAM(s) Oral two times a day  naloxone Injectable 0.4 milliGRAM(s) IV Push once  pantoprazole    Tablet 40 milliGRAM(s) Oral before breakfast  piperacillin/tazobactam IVPB.. 3.375 Gram(s) IV Intermittent every 8 hours  polyethylene glycol 3350 17 Gram(s) Oral daily  senna 2 Tablet(s) Oral at bedtime    MEDICATIONS  (PRN):  acetaminophen     Tablet .. 650 milliGRAM(s) Oral every 6 hours PRN Temp greater or equal to 38C (100.4F), Mild Pain (1 - 3)  aluminum hydroxide/magnesium hydroxide/simethicone Suspension 30 milliLiter(s) Oral every 4 hours PRN Dyspepsia  bisacodyl 5 milliGRAM(s) Oral daily PRN Constipation  melatonin 3 milliGRAM(s) Oral at bedtime PRN Insomnia  morphine  - Injectable 2 milliGRAM(s) IV Push every 4 hours PRN Moderate Pain (4 - 6)  morphine  - Injectable 4 milliGRAM(s) IV Push every 4 hours PRN Severe Pain (7 - 10)  ondansetron Injectable 4 milliGRAM(s) IV Push every 8 hours PRN Nausea and/or Vomiting          T(C): 37.1 (01-11 @ 09:00), Max: 37.1 (01-11 @ 09:00)   HR: 86   BP: 111/71   RR: 18   SpO2: 96%    PHYSICAL EXAM:    CONSTITUTIONAL: NAD, well-developed, well-groomed  EYES: PERRLA; conjunctiva and sclera clear  ENMT: submandibular swelling  NECK: Supple, no palpable masses; no thyromegaly  RESPIRATORY: Normal respiratory effort; lungs are clear to auscultation bilaterally  CARDIOVASCULAR: Regular rate and rhythm, normal S1 and S2, no murmur/rub/gallop; No lower extremity edema; Peripheral pulses are 2+ bilaterally  ABDOMEN: Nontender to palpation, normoactive bowel sounds, no rebound/guarding; No hepatosplenomegaly  MUSCULOSKELETAL:  no clubbing or cyanosis of digits; no joint swelling or tenderness to palpation  PSYCH: A+O to person, place, and time; affect appropriate  NEUROLOGY: CN 2-12 are intact and symmetric; no gross sensory deficits   SKIN: No rashes; no palpable lesions      LABS:                        11.6   8.49  )-----------( 161      ( 11 Jan 2024 05:55 )             36.4      01-11    137  |  99  |  16  ----------------------------<  85  3.5   |  23  |  0.65    Ca    8.7      11 Jan 2024 05:55  Phos  1.8     01-11  Mg     1.9     01-11    TPro  6.5  /  Alb  3.6  /  TBili  0.9  /  DBili  x   /  AST  16  /  ALT  7<L>  /  AlkPhos  60  01-11       CAPILLARY BLOOD GLUCOSE          RADIOLOGY & ADDITIONAL TESTS:    Imaging Personally Reviewed:  Consultant(s) Notes Reviewed:    Care Discussed with Consultants/Other Providers:   Andre Reyes, M.D.  Pager: [phone]  Office: [phone]    Patient is a 73y old  Female who presents with a chief complaint of facial swelling (11 Jan 2024 08:52)          SUBJECTIVE / OVERNIGHT EVENTS:    No acute overnight events.  c/o difficulty swallowing, mandibular and scalp pain.    ROS: ( - ) Fever, ( - )Chills,  ( - )Nausea/Vomiting, ( - ) Cough, ( - )Shortness of breath, ( - )Chest Pain    MEDICATIONS  (STANDING):  dextrose 5% + sodium chloride 0.45%. 1000 milliLiter(s) (80 mL/Hr) IV Continuous <Continuous>  gabapentin 100 milliGRAM(s) Oral three times a day  memantine 10 milliGRAM(s) Oral two times a day  naloxone Injectable 0.4 milliGRAM(s) IV Push once  pantoprazole    Tablet 40 milliGRAM(s) Oral before breakfast  piperacillin/tazobactam IVPB.. 3.375 Gram(s) IV Intermittent every 8 hours  polyethylene glycol 3350 17 Gram(s) Oral daily  senna 2 Tablet(s) Oral at bedtime    MEDICATIONS  (PRN):  acetaminophen     Tablet .. 650 milliGRAM(s) Oral every 6 hours PRN Temp greater or equal to 38C (100.4F), Mild Pain (1 - 3)  aluminum hydroxide/magnesium hydroxide/simethicone Suspension 30 milliLiter(s) Oral every 4 hours PRN Dyspepsia  bisacodyl 5 milliGRAM(s) Oral daily PRN Constipation  melatonin 3 milliGRAM(s) Oral at bedtime PRN Insomnia  morphine  - Injectable 2 milliGRAM(s) IV Push every 4 hours PRN Moderate Pain (4 - 6)  morphine  - Injectable 4 milliGRAM(s) IV Push every 4 hours PRN Severe Pain (7 - 10)  ondansetron Injectable 4 milliGRAM(s) IV Push every 8 hours PRN Nausea and/or Vomiting          T(C): 37.1 (01-11 @ 09:00), Max: 37.1 (01-11 @ 09:00)   HR: 86   BP: 111/71   RR: 18   SpO2: 96%    PHYSICAL EXAM:    CONSTITUTIONAL: NAD, well-developed, well-groomed  EYES: PERRLA; conjunctiva and sclera clear  ENMT: submandibular swelling  NECK: Supple, no palpable masses; no thyromegaly  RESPIRATORY: Normal respiratory effort; lungs are clear to auscultation bilaterally  CARDIOVASCULAR: Regular rate and rhythm, normal S1 and S2, no murmur/rub/gallop; No lower extremity edema; Peripheral pulses are 2+ bilaterally  ABDOMEN: Nontender to palpation, normoactive bowel sounds, no rebound/guarding; No hepatosplenomegaly  MUSCULOSKELETAL:  no clubbing or cyanosis of digits; no joint swelling or tenderness to palpation  PSYCH: A+O to person, place, and time; affect appropriate  NEUROLOGY: CN 2-12 are intact and symmetric; no gross sensory deficits   SKIN: No rashes; no palpable lesions      LABS:                        11.6   8.49  )-----------( 161      ( 11 Jan 2024 05:55 )             36.4      01-11    137  |  99  |  16  ----------------------------<  85  3.5   |  23  |  0.65    Ca    8.7      11 Jan 2024 05:55  Phos  1.8     01-11  Mg     1.9     01-11    TPro  6.5  /  Alb  3.6  /  TBili  0.9  /  DBili  x   /  AST  16  /  ALT  7<L>  /  AlkPhos  60  01-11       CAPILLARY BLOOD GLUCOSE          RADIOLOGY & ADDITIONAL TESTS:    Imaging Personally Reviewed:  Consultant(s) Notes Reviewed:    Care Discussed with Consultants/Other Providers:

## 2024-01-11 NOTE — PROGRESS NOTE ADULT - PROBLEM SELECTOR PLAN 3
h/o mci/dementia (alzheimer), anx/dep/insomnia  outpatient neuro and int med documentation reviewed  patient with progressively worsening decline in short term memory  follow up tsh, folate, b12, rpr  Monitor mental status with frequent neurochecks  maintain fall+frac, seizure, delirium, aspiration precautions; keep head end of bed elevated  pt/ot eval + sw/cm consult for disposition  cont home memantine (was being planned for starting rivastigmine and dc'ing memantine)  neuro follow up upon discharge h/o mci/dementia (alzheimer), anx/dep/insomnia  outpatient neuro and int med documentation reviewed  patient with progressively worsening decline in short term memory  cont home memantine (was being planned for starting rivastigmine and dc'ing memantine)  neuro follow up upon discharge  Thyroid Stimulating Hormone, Serum: 1.71 uIU/mL (01.11.24 @ 05:55)  will check b12, folate, rpr

## 2024-01-11 NOTE — PROGRESS NOTE ADULT - SUBJECTIVE AND OBJECTIVE BOX
Patient examined bedside resting comfortably , NAEON, AVSS, pain well controlled.     HPI:  HPI: 73 F with pmhx memory issues and osteoporosis presents ED complaining of worsening facial swelling and pain.  Patient reports that in September 2023 she had a tooth extracted and then developed MRONJ  thought to be related to Prolia injections.  She reports that she has been following with both [facility] Dr. Etienne Cooper who performed a washout procedure in November 2023 and with ENT Dr. Urbano Andujar as well as her PCP.  She reports that over the last month has had increased swelling to the lower jaw area with increased pain which acutely progressed 3 days ago.  She was prescribed Biaxin which she has been taking for approximately 1 week without much relief.  Saw ENT today who recommended patient come to the emergency department for IV antibiotics. She has not seen her oral surgeon since procedure.  CT+  hypodense lesion with irregular rind of enhancement centered in the right aspect of the floor of mouth measuring approximately 3.3 x 2.4 x 2.6 cm. Patient reports that she has trismus, dysphagia, odynophagia and difficulty eating secondary to swelling and has lost several pounds.  She denies fevers, chills, difficulty breathing, chest pain, shortness of breath abdominal pain nausea or vomiting. Pt on Zosyn in ED.         Labs:                         11.6   8.49  )-----------( 161      ( 11 Jan 2024 05:55 )             36.4     I&O's Detail    Vital Signs Last 24 Hrs  T(C): 36.5 (11 Jan 2024 03:53), Max: 36.9 (10 Fermin 2024 13:41)  T(F): 97.7 (11 Jan 2024 03:53), Max: 98.5 (11 Jan 2024 03:28)  HR: 78 (11 Jan 2024 03:53) (70 - 99)  BP: 117/77 (11 Jan 2024 03:53) (109/67 - 135/95)  BP(mean): 90 (10 Fermin 2024 18:03) (90 - 91)  RR: 18 (11 Jan 2024 03:53) (16 - 18)  SpO2: 98% (11 Jan 2024 03:53) (96% - 98%)    Parameters below as of 11 Jan 2024 03:53  Patient On (Oxygen Delivery Method): room air        Physical Exam:   Gen: AAOx3, NAD   Head: No edema/tenderness to palpation, no abrasions/lacerations/echymosis    Eyes: EOMI, PERRL, visual acuity intact, no diplopia, supra/infra orbital rims intact, no subconjunctival heme, no telecanthus, no exophthalmos   Ears: Gross hearing intact, No heme appreciated, Condylar head palpated bilaterally.   Nose: No septal hematoma/asymmetry, no epistaxis bilaterally. no abrasions present, no lacerations.   Malar: No malar depression, No CN V-2 paresthesia   Throat: No LAD, supple, no lesions     CT Maxillofacial     Assessment:    Plan:  Wagner Garvin  Oral and Maxillofacial Surgery   Pager #[phone]  Available on [employer] Teams       Patient examined bedside resting comfortably , NAEON, AVSS, pain well controlled.     HPI:  HPI: 73 F with pmhx memory issues and osteoporosis presents ED complaining of worsening facial swelling and pain.  Patient reports that in September 2023 she had a tooth extracted and then developed MRONJ  thought to be related to Prolia injections.  She reports that she has been following with both OMFS Dr. Etienne Cooper who performed a washout procedure in November 2023 and with ENT Dr. Urbano Andujar as well as her PCP.  She reports that over the last month has had increased swelling to the lower jaw area with increased pain which acutely progressed 3 days ago.  She was prescribed Biaxin which she has been taking for approximately 1 week without much relief.  Saw ENT today who recommended patient come to the emergency department for IV antibiotics. She has not seen her oral surgeon since procedure.  CT+  hypodense lesion with irregular rind of enhancement centered in the right aspect of the floor of mouth measuring approximately 3.3 x 2.4 x 2.6 cm. Patient reports that she has trismus, dysphagia, odynophagia and difficulty eating secondary to swelling and has lost several pounds.  She denies fevers, chills, difficulty breathing, chest pain, shortness of breath abdominal pain nausea or vomiting. Pt on Zosyn in ED.         Labs:                         11.6   8.49  )-----------( 161      ( 11 Jan 2024 05:55 )             36.4     I&O's Detail    Vital Signs Last 24 Hrs  T(C): 36.5 (11 Jan 2024 03:53), Max: 36.9 (10 Fermin 2024 13:41)  T(F): 97.7 (11 Jan 2024 03:53), Max: 98.5 (11 Jan 2024 03:28)  HR: 78 (11 Jan 2024 03:53) (70 - 99)  BP: 117/77 (11 Jan 2024 03:53) (109/67 - 135/95)  BP(mean): 90 (10 Fermin 2024 18:03) (90 - 91)  RR: 18 (11 Jan 2024 03:53) (16 - 18)  SpO2: 98% (11 Jan 2024 03:53) (96% - 98%)    Parameters below as of 11 Jan 2024 03:53  Patient On (Oxygen Delivery Method): room air        Physical Exam:   Gen: AAOx3, NAD   Head: No edema/tenderness to palpation, no abrasions/lacerations/echymosis    Eyes: EOMI, PERRL, visual acuity intact, no diplopia, supra/infra orbital rims intact, no subconjunctival heme, no telecanthus, no exophthalmos   Ears: Gross hearing intact, No heme appreciated, Condylar head palpated bilaterally.   Nose: No septal hematoma/asymmetry, no epistaxis bilaterally. no abrasions present, no lacerations.   Malar: No malar depression, No CN V-2 paresthesia   Throat: No LAD, supple, no lesions     CT Maxillofacial     Assessment:    Plan:  Wagner Garvin  Oral and Maxillofacial Surgery   Pager #[phone]  Available on [employer] Teams       Patient examined bedside resting comfortably , NAEON, VSS, low grade fever yesterday pm, pain moderatly controlled w/ rx, condition consistent w/ exam last night, pt reports s    HPI:  HPI: 73 F with pmhx memory issues and osteoporosis presents ED complaining of worsening facial swelling and pain.  Patient reports that in September 2023 she had a tooth extracted and then developed MRONJ  thought to be related to Prolia injections.  She reports that she has been following with both OM[facility] Dr. Etienne Cooper who performed a washout procedure in November 2023 and with ENT Dr. Urbano Andujar as well as her PCP.  She reports that over the last month has had increased swelling to the lower jaw area with increased pain which acutely progressed 3 days ago.  She was prescribed Biaxin which she has been taking for approximately 1 week without much relief.  Saw ENT today who recommended patient come to the emergency department for IV antibiotics. She has not seen her oral surgeon since procedure.  CT+  hypodense lesion with irregular rind of enhancement centered in the right aspect of the floor of mouth measuring approximately 3.3 x 2.4 x 2.6 cm. Patient reports that she has trismus, dysphagia, odynophagia and difficulty eating secondary to swelling and has lost several pounds.  She denies fevers, chills, difficulty breathing, chest pain, shortness of breath abdominal pain nausea or vomiting. Pt on Zosyn in ED.         Labs:                         11.6   8.49  )-----------( 161      ( 11 Jan 2024 05:55 )             36.4     I&O's Detail    Vital Signs Last 24 Hrs  T(C): 36.5 (11 Jan 2024 03:53), Max: 36.9 (10 Fermin 2024 13:41)  T(F): 97.7 (11 Jan 2024 03:53), Max: 98.5 (11 Jan 2024 03:28)  HR: 78 (11 Jan 2024 03:53) (70 - 99)  BP: 117/77 (11 Jan 2024 03:53) (109/67 - 135/95)  BP(mean): 90 (10 Fermin 2024 18:03) (90 - 91)  RR: 18 (11 Jan 2024 03:53) (16 - 18)  SpO2: 98% (11 Jan 2024 03:53) (96% - 98%)    Parameters below as of 11 Jan 2024 03:53  Patient On (Oxygen Delivery Method): room air        Physical Exam:   Physical Exam:   Gen: AAOx3, NAD   Head: firmness/ swelling /tenderness to palpation submental region, tenderness and pain b/l preauricular area and b/l SCM/neck.  Eyes: EOMI, PERRL, visual acuity intact, no diplopia  Ears: Gross hearing intact,  no otorrhea  Nose: No asymmetry, no rhinorrhea  Malar: No CN V-2 paresthesia   Throat: No LAD, supple, trachea midline  Extraoral/Intraoral Exam: CHRISTOPH: 25, dentition grossly intact, no CN V-3 paresthesia, floor of mouth firm and raised, tenderness of FOM, inferior border of the mandible is palpable bilaterally, no exposed bone noted in site of previous MRONJ       Assessment:    Plan:  Wagner Garvin  Oral and Maxillofacial Surgery   Pager #[phone]  Available on [employer] Teams       Patient examined bedside resting comfortably , NAEON, VSS, low grade fever yesterday pm, pain moderatly controlled w/ rx, condition consistent w/ exam last night, pt reports s    HPI:  HPI: 73 F with pmhx memory issues and osteoporosis presents ED complaining of worsening facial swelling and pain.  Patient reports that in September 2023 she had a tooth extracted and then developed MRONJ  thought to be related to Prolia injections.  She reports that she has been following with both OMFS Dr. Etienne Cooper who performed a washout procedure in November 2023 and with ENT Dr. Urbano Andujar as well as her PCP.  She reports that over the last month has had increased swelling to the lower jaw area with increased pain which acutely progressed 3 days ago.  She was prescribed Biaxin which she has been taking for approximately 1 week without much relief.  Saw ENT today who recommended patient come to the emergency department for IV antibiotics. She has not seen her oral surgeon since procedure.  CT+  hypodense lesion with irregular rind of enhancement centered in the right aspect of the floor of mouth measuring approximately 3.3 x 2.4 x 2.6 cm. Patient reports that she has trismus, dysphagia, odynophagia and difficulty eating secondary to swelling and has lost several pounds.  She denies fevers, chills, difficulty breathing, chest pain, shortness of breath abdominal pain nausea or vomiting. Pt on Zosyn in ED.         Labs:                         11.6   8.49  )-----------( 161      ( 11 Jan 2024 05:55 )             36.4     I&O's Detail    Vital Signs Last 24 Hrs  T(C): 36.5 (11 Jan 2024 03:53), Max: 36.9 (10 Fermin 2024 13:41)  T(F): 97.7 (11 Jan 2024 03:53), Max: 98.5 (11 Jan 2024 03:28)  HR: 78 (11 Jan 2024 03:53) (70 - 99)  BP: 117/77 (11 Jan 2024 03:53) (109/67 - 135/95)  BP(mean): 90 (10 Fermin 2024 18:03) (90 - 91)  RR: 18 (11 Jan 2024 03:53) (16 - 18)  SpO2: 98% (11 Jan 2024 03:53) (96% - 98%)    Parameters below as of 11 Jan 2024 03:53  Patient On (Oxygen Delivery Method): room air        Physical Exam:   Physical Exam:   Gen: AAOx3, NAD   Head: firmness/ swelling /tenderness to palpation submental region, tenderness and pain b/l preauricular area and b/l SCM/neck.  Eyes: EOMI, PERRL, visual acuity intact, no diplopia  Ears: Gross hearing intact,  no otorrhea  Nose: No asymmetry, no rhinorrhea  Malar: No CN V-2 paresthesia   Throat: No LAD, supple, trachea midline  Extraoral/Intraoral Exam: CHRISTOPH: 25, dentition grossly intact, no CN V-3 paresthesia, floor of mouth firm and raised, tenderness of FOM, inferior border of the mandible is palpable bilaterally, no exposed bone noted in site of previous MRONJ       Assessment:    Plan:  Wagner Garvin  Oral and Maxillofacial Surgery   Pager #[phone]  Available on [employer] Teams       HPI: 73 F with pmhx memory issues and osteoporosis presents ED complaining of worsening facial swelling and pain.  Patient reports that in September 2023 she had a tooth extracted and then developed MRONJ  thought to be related to Prolia injections.  She reports that she has been following with both OMFS Dr. Etienne Cooper who performed a washout procedure in November 2023 and with ENT Dr. Urbano Andujar as well as her PCP.  She reports that over the last month has had increased swelling to the lower jaw area with increased pain which acutely progressed 3 days ago.  She was prescribed Biaxin which she has been taking for approximately 1 week without much relief.  Saw ENT today who recommended patient come to the emergency department for IV antibiotics. She has not seen her oral surgeon since procedure.  CT+  hypodense lesion with irregular rind of enhancement centered in the right aspect of the floor of mouth measuring approximately 3.3 x 2.4 x 2.6 cm. Patient reports that she has trismus, dysphagia, odynophagia and difficulty eating secondary to swelling and has lost several pounds.  She denies fevers, chills, difficulty breathing, chest pain, shortness of breath abdominal pain nausea or vomiting. Pt on Zosyn in ED.     Interval Events: Patient examined bedside resting comfortably, NAEON, VSS, low grade fever yesterday pm, pain moderately controlled w/ rx, condition consistent w/ exam last night. Pt continues to endorse trismus, odynophagia, and dysphagia. Denies any difficulty breathing. Still on IV Zosyn.         Labs:                         11.6   8.49  )-----------( 161      ( 11 Jan 2024 05:55 )             36.4     I&O's Detail    Vital Signs Last 24 Hrs  T(C): 36.5 (11 Jan 2024 03:53), Max: 36.9 (10 Fermin 2024 13:41)  T(F): 97.7 (11 Jan 2024 03:53), Max: 98.5 (11 Jan 2024 03:28)  HR: 78 (11 Jan 2024 03:53) (70 - 99)  BP: 117/77 (11 Jan 2024 03:53) (109/67 - 135/95)  BP(mean): 90 (10 Fermin 2024 18:03) (90 - 91)  RR: 18 (11 Jan 2024 03:53) (16 - 18)  SpO2: 98% (11 Jan 2024 03:53) (96% - 98%)    Parameters below as of 11 Jan 2024 03:53  Patient On (Oxygen Delivery Method): room air      Physical Exam:   Gen: AAOx3, NAD   Head: firmness/ swelling /tenderness to palpation submental region, tenderness and pain b/l preauricular area and b/l SCM/neck.  Eyes: EOMI, PERRL, visual acuity intact, no diplopia  Ears: Gross hearing intact,  no otorrhea  Nose: No asymmetry, no rhinorrhea  Malar: No CN V-2 paresthesia   Throat: No LAD, supple, trachea midline  Extraoral/Intraoral Exam: CHRISTOPH: 25, dentition grossly intact, no CN V-3 paresthesia, floor of mouth firm and raised, tenderness of FOM, inferior border of the mandible is palpable bilaterally, no exposed bone noted in site of previous MRONJ

## 2024-01-11 NOTE — PROGRESS NOTE ADULT - PROBLEM SELECTOR PLAN 2
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  imaging shows "bony erosive and sclerotic changes involving the lingual cortex of the left mandibular body, likely due to provided history of osteonecrosis."  omfs and ent consulted  by er  supportive mgmt w analgesics  slp eval in am  omfs/ent follow up in am h/o mronj 2/2 ?tooth extraction vs prolia injections?  imaging shows "bony erosive and sclerotic changes involving the lingual cortex of the left mandibular body, likely due to provided history of osteonecrosis."  pain control

## 2024-01-11 NOTE — CHART NOTE - NSCHARTNOTEFT_GEN_A_CORE
Procedure: Bedside Incision and drainage of floor of mouth collection  Consent signed   Local anesthesia: 3.4cc 2% lido w/ 1:100k epi   Spontaneous purulence noted at floor of mouth at left side, 15 blade for incision along gingiva, latasha purulence encountered, culture taken x2, wash out of area with saline, subperosteal dissection along lingual aspect of anterior mandible, washed out with saline, hemostatic post op.     Post op care   - patient to avoid straws, otherwise unrestricted diet as tolerated   - follow up cultures   - Please give Peridex mouth wash twice daily swish for 1 minute and spit   - OMFS will continue to follow closely     Melissa Smith  Oral Maxillofacial Surgery  [facility]: [id number]  [facility]: [phone]  [facility]: [phone]  Available on [employer] Teams Procedure: Bedside Incision and drainage of floor of mouth collection  Consent signed   Local anesthesia: 3.4cc 2% lido w/ 1:100k epi   Spontaneous purulence noted at floor of mouth at left side, 15 blade for incision along gingiva, latasha purulence encountered, culture taken x2, wash out of area with saline, subperosteal dissection along lingual aspect of anterior mandible, washed out with saline, hemostatic post op.     Post op care   - patient to avoid straws, otherwise unrestricted diet as tolerated   - follow up cultures   - Please give Peridex mouth wash twice daily swish for 1 minute and spit   - OMFS will continue to follow closely     Melissa Smith  Oral Maxillofacial Surgery  [facility]: [id number]  [facility]: [phone]  [facility]: [phone]  Available on [employer] Teams

## 2024-01-11 NOTE — CONSULT NOTE ADULT - ASSESSMENT
Patient seen and examined, agree with above assessment and plan as transcribed above.    - No need for further inpatient cardiac work up prior to operative procedures    Maurice Wilson MD, [facility]  BEEPER [phone]   Patient seen and examined, agree with above assessment and plan as transcribed above.    - No need for further inpatient cardiac work up prior to operative procedures    Maurice Wilson MD, [facility]  BEEPER [phone]

## 2024-01-11 NOTE — CONSULT NOTE ADULT - SUBJECTIVE AND OBJECTIVE BOX
Interventional Radiology    Evaluate for Procedure: Drainage/Biopsy of abscess in midline floor of mouth    HPI: 73 F with pmhx memory issues and osteoporosis presents ED complaining of worsening facial swelling and pain. Hypodense lesion with irregular ri of enhancement centered in the right aspect of the floor of mouth measuring approximately 3.3 x 2.4 x 2.6 cm. IR consulted for Drainage/Biopsy of abscess in midline floor of mouth.     Allergies: Levaquin (Unknown)  lidocaine (Unknown)    Medications (Abx/Cardiac/Anticoagulation/Blood Products)  piperacillin/tazobactam IVPB..: 25 mL/Hr IV Intermittent (01-11 @ 04:55)  piperacillin/tazobactam IVPB...: 200 mL/Hr IV Intermittent (01-10 @ 19:27)    Data:  162.6  53.1  T(C): 36.5  HR: 78  BP: 117/77  RR: 18  SpO2: 98%    -WBC 8.49 / HgB 11.6 / Hct 36.4 / Plt 161  -Na 137 / Cl 99 / BUN 16 / Glucose 85  -K 3.5 / CO2 23 / Cr 0.65  -ALT 7 / Alk Phos 60 / T.Bili 0.9  -INR 1.19 / PTT 25.5    Radiology:     < from: CT Neck Soft Tissue w/ IV Cont (01.10.24 @ 17:22) >  Redemonstrated hypodense lesion with irregular rind of enhancement   centered in the right aspect of the floor of mouth measuring   approximately 3.3 x 2.4 x 2.6 cm.     < end of copied text >    Assessment/Plan:   -73y Female with pmhx memory issues and osteoporosis presents ED complaining of worsening facial swelling and pain. Hypodense lesion with irregular ri of enhancement centered in the right aspect of the floor of mouth measuring approximately 3.3 x 2.4 x 2.6 cm. IR consulted for Drainage/Biopsy of abscess in midline floor of mouth.     - case reviewed with Dr. Gil  - Recommend re-consult ENT given collection can be accessed orally  - d/w primary team

## 2024-01-11 NOTE — PATIENT PROFILE ADULT - FALL HARM RISK - HARM RISK INTERVENTIONS
Assistance OOB with selected safe patient handling equipment/Communicate Risk of Fall with Harm to all staff/Discuss with provider need for PT consult/Monitor gait and stability/Provide patient with walking aids - walker, cane, crutches/Reinforce activity limits and safety measures with patient and family/Tailored Fall Risk Interventions/Visual Cue: Yellow wristband and red socks/Bed in lowest position, wheels locked, appropriate side rails in place/Call bell, personal items and telephone in reach/Instruct patient to call for assistance before getting out of bed or chair/Non-slip footwear when patient is out of bed/[place] to call system/Physically safe environment - no spills, clutter or unnecessary equipment/Purposeful Proactive Rounding/Room/bathroom lighting operational, light cord in reach Assistance OOB with selected safe patient handling equipment/Communicate Risk of Fall with Harm to all staff/Discuss with provider need for PT consult/Monitor gait and stability/Provide patient with walking aids - walker, cane, crutches/Reinforce activity limits and safety measures with patient and family/Tailored Fall Risk Interventions/Visual Cue: Yellow wristband and red socks/Bed in lowest position, wheels locked, appropriate side rails in place/Call bell, personal items and telephone in reach/Instruct patient to call for assistance before getting out of bed or chair/Non-slip footwear when patient is out of bed/[place] to call system/Physically safe environment - no spills, clutter or unnecessary equipment/Purposeful Proactive Rounding/Room/bathroom lighting operational, light cord in reach

## 2024-01-11 NOTE — PROGRESS NOTE ADULT - ASSESSMENT
pending plan yes 73 F with pmhx memory issues and osteoporosis presents ED complaining of worsening facial swelling and pain. Hypodense lesion with irregular ri of enhancement centered in the right aspect of the floor of mouth measuring approximately 3.3 x 2.4 x 2.6 cm. Clinical presentation c/w abscess v necrotic mass. Given normal WBC, febrile low concern for acute infection. Given pts clinical exam w/ firm floor of mouth, neck mass and weight loss concern for neoplastic process.    Plan:  - Admission to Medicine  - Recommend IR consultation for core biopsy v aspiration  - C/w IV abx  - Multimodal pain control  - OMFS to follow closely      OMFS  [id number] 73 F with pmhx memory issues and osteoporosis presents ED complaining of worsening facial swelling and pain. Hypodense lesion with irregular ri of enhancement centered in the right aspect of the floor of mouth measuring approximately 3.3 x 2.4 x 2.6 cm. Clinical presentation c/w abscess v necrotic mass. Given normal WBC, febrile low concern for acute infection. Given pts clinical exam w/ firm floor of mouth, neck mass and weight loss concern for neoplastic process.    Plan:  - Admission to Medicine  - Recommend IR consultation for core biopsy v aspiration  - C/w IV abx  - Multimodal pain control  - OMFS to follow closely      OMFS  [id number]

## 2024-01-11 NOTE — CONSULT NOTE ADULT - SUBJECTIVE AND OBJECTIVE BOX
CC: airway evaluation    HPI: 74yo female with PMHx memory issues presents to ED complaining of worsened facial swelling discomfort.  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  She reports that she has been following with both [facility] Dr. Etienne Cooper who performed a washout procedure in November 2023 and with ENT Dr. Urbano Andujar as well as her PCP.  She reports that over the last month has had increased swelling to the lower jaw area with increased pain which acutely progressed 2 days ago.  She was prescribed Biaxin which she has been taking for approximately 1 week without much relief.  Saw ENT today who recommended patient come to the emergency department for IV antibiotics. She has not seen her oral surgeon since procedure. ENT called for airway evaluation.  Patient reports that she has difficulty opening her mouth and eating secondary to swelling and pain. and she has lost several pounds.  She denies fevers, chills, difficulty breathing, chest pain, shortness of breath abdominal pain nausea or vomiting, stridor, hoarseness, dysphagia, or odynophagia. CT neck shows Peripherally enhancing necrotic mass within the anterior midline floor of   mouth. ?Abscess vs necrotic neoplasm. Bone erosion involving the left mandible which is adjacent to but appears separate from the soft tissue mass. While this likely represents   osteonecrosis, the possibility of osteomyelitis or tumor infiltration is not excluded.    PAST MEDICAL & SURGICAL HISTORY:  IBS (irritable bowel syndrome)      Hashimoto's thyroiditis      Osteoporosis        Allergies    Levaquin (Unknown)  lidocaine (Unknown)    Intolerances      MEDICATIONS  (STANDING):  dextrose 5% + sodium chloride 0.45%. 1000 milliLiter(s) (80 mL/Hr) IV Continuous <Continuous>  dextrose 5% + sodium chloride 0.45%. 1000 milliLiter(s) (80 mL/Hr) IV Continuous <Continuous>  gabapentin 100 milliGRAM(s) Oral three times a day  memantine 10 milliGRAM(s) Oral two times a day  naloxone Injectable 0.4 milliGRAM(s) IV Push once  pantoprazole    Tablet 40 milliGRAM(s) Oral before breakfast  piperacillin/tazobactam IVPB.. 3.375 Gram(s) IV Intermittent every 8 hours  polyethylene glycol 3350 17 Gram(s) Oral daily  senna 2 Tablet(s) Oral at bedtime    MEDICATIONS  (PRN):  acetaminophen     Tablet .. 650 milliGRAM(s) Oral every 6 hours PRN Temp greater or equal to 38C (100.4F), Mild Pain (1 - 3)  aluminum hydroxide/magnesium hydroxide/simethicone Suspension 30 milliLiter(s) Oral every 4 hours PRN Dyspepsia  bisacodyl 5 milliGRAM(s) Oral daily PRN Constipation  ketorolac   Injectable 15 milliGRAM(s) IV Push every 6 hours PRN Moderate Pain (4 - 6)  melatonin 3 milliGRAM(s) Oral at bedtime PRN Insomnia  morphine  - Injectable 4 milliGRAM(s) IV Push every 4 hours PRN Severe Pain (7 - 10)  ondansetron Injectable 4 milliGRAM(s) IV Push every 8 hours PRN Nausea and/or Vomiting      social history: see consult     family history: see consult     ROS:   ENT: all negative except as noted in HPI   Pulm: denies SOB, cough, hemoptysis  Neuro: denies numbness/tingling, loss of sensation  Endo: denies heat/cold intolerance, excessive sweating      Vital Signs Last 24 Hrs  T(C): 36.8 (11 Jan 2024 13:32), Max: 37.1 (11 Jan 2024 09:00)  T(F): 98.2 (11 Jan 2024 13:32), Max: 98.7 (11 Jan 2024 09:00)  HR: 83 (11 Jan 2024 13:32) (70 - 99)  BP: 115/73 (11 Jan 2024 13:32) (111/71 - 135/95)  BP(mean): 90 (10 Fermin 2024 18:03) (90 - 91)  RR: 18 (11 Jan 2024 13:32) (16 - 18)  SpO2: 97% (11 Jan 2024 13:32) (96% - 98%)    Parameters below as of 11 Jan 2024 13:32  Patient On (Oxygen Delivery Method): room air                              11.6   8.49  )-----------( 161      ( 11 Jan 2024 05:55 )             36.4    01-11    137  |  99  |  16  ----------------------------<  85  3.5   |  23  |  0.65    Ca    8.7      11 Jan 2024 05:55  Phos  1.8     01-11  Mg     1.9     01-11    TPro  6.5  /  Alb  3.6  /  TBili  0.9  /  DBili  x   /  AST  16  /  ALT  7<L>  /  AlkPhos  60  01-11   PT/INR - ( 11 Jan 2024 05:55 )   PT: 12.4 sec;   INR: 1.19 ratio         PTT - ( 11 Jan 2024 05:55 )  PTT:25.5 sec        PHYSICAL EXAM:  Gen: NAD  Skin: No rashes, bruises, or lesions  Head: Normocephalic, Atraumatic  Face: + submental swelling, middle of mandible TTP, No erythema, or fluctuance. Parotid glands soft without mass  Eyes: no scleral injection  Nose: Nares bilaterally patent, no discharge  Mouth: + trismus, sublingual swelling, tender to palpation on body of mandible. difficult to visualize posterior pharyx  Neck: Flat, supple, no lymphadenopathy, trachea midline, no masses  Lymphatic: No lymphadenopathy  Resp: breathing easily, no stridor  CV: no peripheral edema/cyanosis  GI: nondistended   Peripheral vascular: no JVD or edema  Neuro: facial nerve intact, no facial droop      IMAGING/ADDITIONAL STUDIES:   < from: CT Neck Soft Tissue w/ IV Cont (01.09.24 @ 08:41) >  EXAM: [id number] - CT NECK SOFT TISSUE IC  - ORDERED BY: URBANO ANDUJAR    PROCEDURE DATE:  01/09/2024       INTERPRETATION:  INDICATIONS:  Medication related osteonecrosis of jaw   now with lymphadenopathy    TECHNIQUE:   Axial images were obtained following the intravenous   administration of contrast material. Sagittal and coronal reformatted   images were obtained. 90 cc Omnipaque 350 were administered. 10 cc were   discarded.    COMPARISON EXAMINATION:  None.    FINDINGS:  AERODIGESTIVE TRACT:  There is an area of low density within the midline   anterior floor of mouth with an irregular enhancing wall measuring 2.5 x   1.5 x 2.5 cm. There is displacement of the lingual septum to the left as   well as infiltration of the genioglossus/geniohyoid musculature. The   superior margin of the mass and remaining oral cavity are degraded by   dental artifact. The lesion abuts and appears to involve the mylohyoid   but does not extend into the adjacent submental/submandibular space. Mild   fat plane haziness is seen below the mandible with thickening of the   platysma. The adjacent mandibular lingual cortex is intact however there   is an adjacent area to the left extending from the left medial incisor to   the molar region demonstrating mandibular bone erosion, lingual cortical   thinning/dehiscence and a linear lucency paralleling the lingual cortex   likely representing osteonecrosis with a bony sequestrum.  LYMPH NODES:  No adenopathy.  PAROTID GLANDS:  Portions are degraded bydental artifact. No abnormality   is seen.  SUBMANDIBULAR GLANDS:  Normal.  THYROID GLAND:  Scattered small nodules  VISUALIZED PARANASAL SINUSES:  Mild mucosal thickening  VISUALIZED TYMPANOMASTOID CAVITIES: Clear.  BONES:  Normal.  MISCELLANEOUS:  Fibrotic changes within the visualized upper lobes of the   lungs. Right sided calcified granuloma right lower lobe superior segment.    IMPRESSION:  Peripherally enhancing necrotic mass within the anterior midline floor of   mouth. While this may represent an abscess collection, the possibility of   a necrotic neoplasm is raised.    Bone erosion involving the left mandible which is adjacent to but appears   separate from the soft tissue mass. While this likely represents   osteonecrosis, the possibility of osteomyelitis or tumor infiltration is   not excluded.    No cervical adenopathy.    --- End of Report ---     CC: airway evaluation    HPI: 72yo female with PMHx memory issues presents to ED complaining of worsened facial swelling discomfort.  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  She reports that she has been following with both [facility] Dr. Etienne Cooper who performed a washout procedure in November 2023 and with ENT Dr. Urbano Andujar as well as her PCP.  She reports that over the last month has had increased swelling to the lower jaw area with increased pain which acutely progressed 2 days ago.  She was prescribed Biaxin which she has been taking for approximately 1 week without much relief.  Saw ENT today who recommended patient come to the emergency department for IV antibiotics. She has not seen her oral surgeon since procedure. ENT called for airway evaluation.  Patient reports that she has difficulty opening her mouth and eating secondary to swelling and pain. and she has lost several pounds.  She denies fevers, chills, difficulty breathing, chest pain, shortness of breath abdominal pain nausea or vomiting, stridor, hoarseness, dysphagia, or odynophagia. CT neck shows Peripherally enhancing necrotic mass within the anterior midline floor of   mouth. ?Abscess vs necrotic neoplasm. Bone erosion involving the left mandible which is adjacent to but appears separate from the soft tissue mass. While this likely represents   osteonecrosis, the possibility of osteomyelitis or tumor infiltration is not excluded.    PAST MEDICAL & SURGICAL HISTORY:  IBS (irritable bowel syndrome)      Hashimoto's thyroiditis      Osteoporosis        Allergies    Levaquin (Unknown)  lidocaine (Unknown)    Intolerances      MEDICATIONS  (STANDING):  dextrose 5% + sodium chloride 0.45%. 1000 milliLiter(s) (80 mL/Hr) IV Continuous <Continuous>  dextrose 5% + sodium chloride 0.45%. 1000 milliLiter(s) (80 mL/Hr) IV Continuous <Continuous>  gabapentin 100 milliGRAM(s) Oral three times a day  memantine 10 milliGRAM(s) Oral two times a day  naloxone Injectable 0.4 milliGRAM(s) IV Push once  pantoprazole    Tablet 40 milliGRAM(s) Oral before breakfast  piperacillin/tazobactam IVPB.. 3.375 Gram(s) IV Intermittent every 8 hours  polyethylene glycol 3350 17 Gram(s) Oral daily  senna 2 Tablet(s) Oral at bedtime    MEDICATIONS  (PRN):  acetaminophen     Tablet .. 650 milliGRAM(s) Oral every 6 hours PRN Temp greater or equal to 38C (100.4F), Mild Pain (1 - 3)  aluminum hydroxide/magnesium hydroxide/simethicone Suspension 30 milliLiter(s) Oral every 4 hours PRN Dyspepsia  bisacodyl 5 milliGRAM(s) Oral daily PRN Constipation  ketorolac   Injectable 15 milliGRAM(s) IV Push every 6 hours PRN Moderate Pain (4 - 6)  melatonin 3 milliGRAM(s) Oral at bedtime PRN Insomnia  morphine  - Injectable 4 milliGRAM(s) IV Push every 4 hours PRN Severe Pain (7 - 10)  ondansetron Injectable 4 milliGRAM(s) IV Push every 8 hours PRN Nausea and/or Vomiting      social history: see consult     family history: see consult     ROS:   ENT: all negative except as noted in HPI   Pulm: denies SOB, cough, hemoptysis  Neuro: denies numbness/tingling, loss of sensation  Endo: denies heat/cold intolerance, excessive sweating      Vital Signs Last 24 Hrs  T(C): 36.8 (11 Jan 2024 13:32), Max: 37.1 (11 Jan 2024 09:00)  T(F): 98.2 (11 Jan 2024 13:32), Max: 98.7 (11 Jan 2024 09:00)  HR: 83 (11 Jan 2024 13:32) (70 - 99)  BP: 115/73 (11 Jan 2024 13:32) (111/71 - 135/95)  BP(mean): 90 (10 Fermin 2024 18:03) (90 - 91)  RR: 18 (11 Jan 2024 13:32) (16 - 18)  SpO2: 97% (11 Jan 2024 13:32) (96% - 98%)    Parameters below as of 11 Jan 2024 13:32  Patient On (Oxygen Delivery Method): room air                              11.6   8.49  )-----------( 161      ( 11 Jan 2024 05:55 )             36.4    01-11    137  |  99  |  16  ----------------------------<  85  3.5   |  23  |  0.65    Ca    8.7      11 Jan 2024 05:55  Phos  1.8     01-11  Mg     1.9     01-11    TPro  6.5  /  Alb  3.6  /  TBili  0.9  /  DBili  x   /  AST  16  /  ALT  7<L>  /  AlkPhos  60  01-11   PT/INR - ( 11 Jan 2024 05:55 )   PT: 12.4 sec;   INR: 1.19 ratio         PTT - ( 11 Jan 2024 05:55 )  PTT:25.5 sec        PHYSICAL EXAM:  Gen: NAD  Skin: No rashes, bruises, or lesions  Head: Normocephalic, Atraumatic  Face: + submental swelling, middle of mandible TTP, No erythema, or fluctuance. Parotid glands soft without mass  Eyes: no scleral injection  Nose: Nares bilaterally patent, no discharge  Mouth: + trismus, sublingual swelling, tender to palpation on body of mandible. difficult to visualize posterior pharyx  Neck: Flat, supple, no lymphadenopathy, trachea midline, no masses  Lymphatic: No lymphadenopathy  Resp: breathing easily, no stridor  CV: no peripheral edema/cyanosis  GI: nondistended   Peripheral vascular: no JVD or edema  Neuro: facial nerve intact, no facial droop      IMAGING/ADDITIONAL STUDIES:   < from: CT Neck Soft Tissue w/ IV Cont (01.09.24 @ 08:41) >  EXAM: [id number] - CT NECK SOFT TISSUE IC  - ORDERED BY: URBANO ANDUJAR    PROCEDURE DATE:  01/09/2024       INTERPRETATION:  INDICATIONS:  Medication related osteonecrosis of jaw   now with lymphadenopathy    TECHNIQUE:   Axial images were obtained following the intravenous   administration of contrast material. Sagittal and coronal reformatted   images were obtained. 90 cc Omnipaque 350 were administered. 10 cc were   discarded.    COMPARISON EXAMINATION:  None.    FINDINGS:  AERODIGESTIVE TRACT:  There is an area of low density within the midline   anterior floor of mouth with an irregular enhancing wall measuring 2.5 x   1.5 x 2.5 cm. There is displacement of the lingual septum to the left as   well as infiltration of the genioglossus/geniohyoid musculature. The   superior margin of the mass and remaining oral cavity are degraded by   dental artifact. The lesion abuts and appears to involve the mylohyoid   but does not extend into the adjacent submental/submandibular space. Mild   fat plane haziness is seen below the mandible with thickening of the   platysma. The adjacent mandibular lingual cortex is intact however there   is an adjacent area to the left extending from the left medial incisor to   the molar region demonstrating mandibular bone erosion, lingual cortical   thinning/dehiscence and a linear lucency paralleling the lingual cortex   likely representing osteonecrosis with a bony sequestrum.  LYMPH NODES:  No adenopathy.  PAROTID GLANDS:  Portions are degraded bydental artifact. No abnormality   is seen.  SUBMANDIBULAR GLANDS:  Normal.  THYROID GLAND:  Scattered small nodules  VISUALIZED PARANASAL SINUSES:  Mild mucosal thickening  VISUALIZED TYMPANOMASTOID CAVITIES: Clear.  BONES:  Normal.  MISCELLANEOUS:  Fibrotic changes within the visualized upper lobes of the   lungs. Right sided calcified granuloma right lower lobe superior segment.    IMPRESSION:  Peripherally enhancing necrotic mass within the anterior midline floor of   mouth. While this may represent an abscess collection, the possibility of   a necrotic neoplasm is raised.    Bone erosion involving the left mandible which is adjacent to but appears   separate from the soft tissue mass. While this likely represents   osteonecrosis, the possibility of osteomyelitis or tumor infiltration is   not excluded.    No cervical adenopathy.    --- End of Report ---

## 2024-01-11 NOTE — CONSULT NOTE ADULT - ASSESSMENT
72yo female with PMHx memory issues presents to ED complaining of worsened facial swelling discomfort.  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  She reports that she has been following with both [facility] Dr. Etienne Cooper who performed a washout procedure in November 2023 and with ENT Dr. Urbano Andujar as well as her PCP.  She reports that over the last month has had increased swelling to the lower jaw area with increased pain which acutely progressed 2 days ago.  She was prescribed Biaxin which she has been taking for approximately 1 week without much relief.  Went to outpatient ENT today, larygoscopy performed was reportedly normal. ENT recommended patient  to the emergency department for IV antibiotics.  ENT called for airway evaluation.  On oral exam, mild trismus, sublingual swelling, tender to palpation on body of mandible. difficult to visualize posterior pharynx.  Flexible laryngoscopy exam unremarkable. No pooling of secretions, no suspicious masses or lesions, wide open airway. No vocal cord abnormalities.              74yo female with PMHx memory issues presents to ED complaining of worsened facial swelling discomfort.  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  She reports that she has been following with both [facility] Dr. Etienne Cooper who performed a washout procedure in November 2023 and with ENT Dr. Urbano Andujar as well as her PCP.  She reports that over the last month has had increased swelling to the lower jaw area with increased pain which acutely progressed 2 days ago.  She was prescribed Biaxin which she has been taking for approximately 1 week without much relief.  Went to outpatient ENT today, larygoscopy performed was reportedly normal. ENT recommended patient  to the emergency department for IV antibiotics.  ENT called for airway evaluation.  On oral exam, mild trismus, sublingual swelling, tender to palpation on body of mandible. difficult to visualize posterior pharynx.  Flexible laryngoscopy exam unremarkable. No pooling of secretions, no suspicious masses or lesions, wide open airway. No vocal cord abnormalities.

## 2024-01-11 NOTE — CONSULT NOTE ADULT - SUBJECTIVE AND OBJECTIVE BOX
C A R D I O L O G Y  *********************    DATE OF SERVICE: 01-11-24    HISTORY OF PRESENT ILLNESS: HPI:  Patient is a 72 y/o female well known to our office with PMH of mci/dementia (alzheimer), anx/dep/insomnia, gerd, osteoporosis, fibromyalgia, ibs-c c/b diverticulosis + hemorrhoids who is admitted with facial swelling found to have necrotic mass in the right anterior floor the mouth. Patient has jaw pain. Reports intermittent palpitations. Denies chest pain, SOB, dizziness, or syncope.    PAST MEDICAL & SURGICAL HISTORY:  IBS (irritable bowel syndrome)      Hashimoto's thyroiditis      Osteoporosis    MEDICATIONS:  MEDICATIONS  (STANDING):  dextrose 5% + sodium chloride 0.45%. 1000 milliLiter(s) (80 mL/Hr) IV Continuous <Continuous>  dextrose 5% + sodium chloride 0.45%. 1000 milliLiter(s) (80 mL/Hr) IV Continuous <Continuous>  gabapentin 100 milliGRAM(s) Oral three times a day  memantine 10 milliGRAM(s) Oral two times a day  naloxone Injectable 0.4 milliGRAM(s) IV Push once  pantoprazole    Tablet 40 milliGRAM(s) Oral before breakfast  piperacillin/tazobactam IVPB.. 3.375 Gram(s) IV Intermittent every 8 hours  polyethylene glycol 3350 17 Gram(s) Oral daily  senna 2 Tablet(s) Oral at bedtime      Allergies    Levaquin (Unknown)  lidocaine (Unknown)    Intolerances        FAMILY HISTORY:    Non-contributary for premature coronary disease or sudden cardiac death    SOCIAL HISTORY:    [x ] Non-smoker  [ ] Smoker  [ ] Alcohol    FLU VACCINE THIS YEAR STARTS IN AUGUST:  [ ] Yes    [ ] No    IF OVER 65 HAVE YOU EVER HAD A PNA VACCINE:  [ ] Yes    [ ] No       [ ] N/A      REVIEW OF SYSTEMS:  [ ]chest pain  [  ]shortness of breath  [ x ]palpitations  [  ]syncope  [ ]near syncope [ ]upper extremity weakness   [ ] lower extremity weakness  [  ]diplopia  [  ]altered mental status   [  ]fevers  [ ]chills [ ]nausea  [ ]vomiting  [  ]dysphagia    [ ]abdominal pain  [ ]melena  [ ]BRBPR    [  ]epistaxis  [  ]rash    [ ]lower extremity edema    +facial swelling/pain    [X] All others negative	  [ ] Unable to obtain      LABS:	 	    CARDIAC MARKERS:                              11.6   8.49  )-----------( 161      ( 11 Jan 2024 05:55 )             36.4     Hb Trend: 11.6<--    01-11    137  |  99  |  16  ----------------------------<  85  3.5   |  23  |  0.65    Ca    8.7      11 Jan 2024 05:55  Phos  1.8     01-11  Mg     1.9     01-11    TPro  6.5  /  Alb  3.6  /  TBili  0.9  /  DBili  x   /  AST  16  /  ALT  7<L>  /  AlkPhos  60  01-11    Creatinine Trend: 0.65<--, 0.75<--    Coags:  PT/INR - ( 11 Jan 2024 05:55 )   PT: 12.4 sec;   INR: 1.19 ratio         PTT - ( 11 Jan 2024 05:55 )  PTT:25.5 sec    proBNP:   Lipid Profile:   HgA1c:   TSH: Thyroid Stimulating Hormone, Serum: 1.71 uIU/mL (01-11 @ 05:55)          PHYSICAL EXAM:  T(C): 36.8 (01-11-24 @ 13:32), Max: 37.1 (01-11-24 @ 09:00)  HR: 83 (01-11-24 @ 13:32) (70 - 91)  BP: 115/73 (01-11-24 @ 13:32) (111/71 - 135/95)  RR: 18 (01-11-24 @ 13:32) (18 - 18)  SpO2: 97% (01-11-24 @ 13:32) (96% - 98%)  Wt(kg): --   BMI (kg/m2): 20.1 (01-10-24 @ 13:41)  I&O's Summary      Gen: NAD  HEENT:  (-)icterus (-)pallor  CV: N S1 S2 1/6 MANUEL (+)2 Pulses B/l  Resp:  Clear to auscultation B/L, normal effort  GI: (+) BS Soft, NT, ND  Lymph:  (-)Edema, (-)obvious lymphadenopathy  Skin: Warm to touch, Normal turgor  Psych: Appropriate mood and affect      TELEMETRY: None 	      ECG: SR with PACs, low voltage QRS - unchanged compared to prior EKGs 	    RADIOLOGY:         CXR: < from: Xray Chest 1 View AP/PA (01.10.24 @ 18:09) >  IMPRESSION:  No focal consolidations.    --- End of Report ---    < end of copied text >      ASSESSMENT/PLAN: Patient is a 72 y/o female well known to our office with PMH of mci/dementia (alzheimer), anx/dep/insomnia, gerd, osteoporosis, fibromyalgia, ibs-c c/b diverticulosis + hemorrhoids who is admitted with facial swelling found to have necrotic mass in the right anterior floor the mouth.    #Necrotic Mass in mouth  - Workup per ENT/IR/OMFS  - Based on patient's RCRI score of 0, would consider her low cardiac risk for any planned procedures/biopsies. Patient is optimized from CV to proceed. No evidence of clinical HF or unstable cardiac syndromes. She had office echo with normal LV/RV function in 1/2023 and normal exercise NST with no ischemia in 7/2021.    #Palpitations  - EKG with SR and PACs  - No arrhythmia on prior office holter monitor. Recommend repeat outpatient holter monitor    - No further inpatient cardiac w/u planned  - Patient to f/u with Dr. Duffy on 3/12 at 3:30 PM    Checo Campo PA-C  Pager: [phone]     C A R D I O L O G Y  *********************    DATE OF SERVICE: 01-11-24    HISTORY OF PRESENT ILLNESS: HPI:  Patient is a 72 y/o female well known to our office with PMH of mci/dementia (alzheimer), anx/dep/insomnia, gerd, osteoporosis, fibromyalgia, ibs-c c/b diverticulosis + hemorrhoids who is admitted with facial swelling found to have necrotic mass in the right anterior floor the mouth. Patient has jaw pain. Reports intermittent palpitations. Denies chest pain, SOB, dizziness, or syncope.    PAST MEDICAL & SURGICAL HISTORY:  IBS (irritable bowel syndrome)      Hashimoto's thyroiditis      Osteoporosis    MEDICATIONS:  MEDICATIONS  (STANDING):  dextrose 5% + sodium chloride 0.45%. 1000 milliLiter(s) (80 mL/Hr) IV Continuous <Continuous>  dextrose 5% + sodium chloride 0.45%. 1000 milliLiter(s) (80 mL/Hr) IV Continuous <Continuous>  gabapentin 100 milliGRAM(s) Oral three times a day  memantine 10 milliGRAM(s) Oral two times a day  naloxone Injectable 0.4 milliGRAM(s) IV Push once  pantoprazole    Tablet 40 milliGRAM(s) Oral before breakfast  piperacillin/tazobactam IVPB.. 3.375 Gram(s) IV Intermittent every 8 hours  polyethylene glycol 3350 17 Gram(s) Oral daily  senna 2 Tablet(s) Oral at bedtime      Allergies    Levaquin (Unknown)  lidocaine (Unknown)    Intolerances        FAMILY HISTORY:    Non-contributary for premature coronary disease or sudden cardiac death    SOCIAL HISTORY:    [x ] Non-smoker  [ ] Smoker  [ ] Alcohol    FLU VACCINE THIS YEAR STARTS IN AUGUST:  [ ] Yes    [ ] No    IF OVER 65 HAVE YOU EVER HAD A PNA VACCINE:  [ ] Yes    [ ] No       [ ] N/A      REVIEW OF SYSTEMS:  [ ]chest pain  [  ]shortness of breath  [ x ]palpitations  [  ]syncope  [ ]near syncope [ ]upper extremity weakness   [ ] lower extremity weakness  [  ]diplopia  [  ]altered mental status   [  ]fevers  [ ]chills [ ]nausea  [ ]vomiting  [  ]dysphagia    [ ]abdominal pain  [ ]melena  [ ]BRBPR    [  ]epistaxis  [  ]rash    [ ]lower extremity edema    +facial swelling/pain    [X] All others negative	  [ ] Unable to obtain      LABS:	 	    CARDIAC MARKERS:                              11.6   8.49  )-----------( 161      ( 11 Jan 2024 05:55 )             36.4     Hb Trend: 11.6<--    01-11    137  |  99  |  16  ----------------------------<  85  3.5   |  23  |  0.65    Ca    8.7      11 Jan 2024 05:55  Phos  1.8     01-11  Mg     1.9     01-11    TPro  6.5  /  Alb  3.6  /  TBili  0.9  /  DBili  x   /  AST  16  /  ALT  7<L>  /  AlkPhos  60  01-11    Creatinine Trend: 0.65<--, 0.75<--    Coags:  PT/INR - ( 11 Jan 2024 05:55 )   PT: 12.4 sec;   INR: 1.19 ratio         PTT - ( 11 Jan 2024 05:55 )  PTT:25.5 sec    proBNP:   Lipid Profile:   HgA1c:   TSH: Thyroid Stimulating Hormone, Serum: 1.71 uIU/mL (01-11 @ 05:55)          PHYSICAL EXAM:  T(C): 36.8 (01-11-24 @ 13:32), Max: 37.1 (01-11-24 @ 09:00)  HR: 83 (01-11-24 @ 13:32) (70 - 91)  BP: 115/73 (01-11-24 @ 13:32) (111/71 - 135/95)  RR: 18 (01-11-24 @ 13:32) (18 - 18)  SpO2: 97% (01-11-24 @ 13:32) (96% - 98%)  Wt(kg): --   BMI (kg/m2): 20.1 (01-10-24 @ 13:41)  I&O's Summary      Gen: NAD  HEENT:  (-)icterus (-)pallor  CV: N S1 S2 1/6 MANUEL (+)2 Pulses B/l  Resp:  Clear to auscultation B/L, normal effort  GI: (+) BS Soft, NT, ND  Lymph:  (-)Edema, (-)obvious lymphadenopathy  Skin: Warm to touch, Normal turgor  Psych: Appropriate mood and affect      TELEMETRY: None 	      ECG: SR with PACs, low voltage QRS - unchanged compared to prior EKGs 	    RADIOLOGY:         CXR: < from: Xray Chest 1 View AP/PA (01.10.24 @ 18:09) >  IMPRESSION:  No focal consolidations.    --- End of Report ---    < end of copied text >      ASSESSMENT/PLAN: Patient is a 72 y/o female well known to our office with PMH of mci/dementia (alzheimer), anx/dep/insomnia, gerd, osteoporosis, fibromyalgia, ibs-c c/b diverticulosis + hemorrhoids who is admitted with facial swelling found to have necrotic mass in the right anterior floor the mouth.    #Necrotic Mass in mouth  - Workup per ENT/IR/OMFS  - Based on patient's RCRI score of 0, would consider her low cardiac risk for any planned procedures/biopsies. Patient is optimized from CV to proceed. No evidence of clinical HF or unstable cardiac syndromes. She had office echo with normal LV/RV function in 1/2023 and normal exercise NST with no ischemia in 7/2021.    #Palpitations  - EKG with SR and PACs  - No arrhythmia on prior office holter monitor. Recommend repeat outpatient holter monitor    - No further inpatient cardiac w/u planned  - Patient to f/u with Dr. Duffy on 3/12 at 3:30 PM    Checo Campo PA-C  Pager: [phone]

## 2024-01-12 DIAGNOSIS — K12.2 CELLULITIS AND ABSCESS OF MOUTH: ICD-10-CM

## 2024-01-12 LAB
ANION GAP SERPL CALC-SCNC: 10 MMOL/L — SIGNIFICANT CHANGE UP (ref 5–17)
ANION GAP SERPL CALC-SCNC: 10 MMOL/L — SIGNIFICANT CHANGE UP (ref 5–17)
BUN SERPL-MCNC: 9 MG/DL — SIGNIFICANT CHANGE UP (ref 7–23)
BUN SERPL-MCNC: 9 MG/DL — SIGNIFICANT CHANGE UP (ref 7–23)
CALCIUM SERPL-MCNC: 8.2 MG/DL — LOW (ref 8.4–10.5)
CALCIUM SERPL-MCNC: 8.2 MG/DL — LOW (ref 8.4–10.5)
CHLORIDE SERPL-SCNC: 97 MMOL/L — SIGNIFICANT CHANGE UP (ref 96–108)
CHLORIDE SERPL-SCNC: 97 MMOL/L — SIGNIFICANT CHANGE UP (ref 96–108)
CO2 SERPL-SCNC: 27 MMOL/L — SIGNIFICANT CHANGE UP (ref 22–31)
CO2 SERPL-SCNC: 27 MMOL/L — SIGNIFICANT CHANGE UP (ref 22–31)
CREAT SERPL-MCNC: 0.56 MG/DL — SIGNIFICANT CHANGE UP (ref 0.5–1.3)
CREAT SERPL-MCNC: 0.56 MG/DL — SIGNIFICANT CHANGE UP (ref 0.5–1.3)
EGFR: 97 ML/MIN/1.73M2 — SIGNIFICANT CHANGE UP
EGFR: 97 ML/MIN/1.73M2 — SIGNIFICANT CHANGE UP
FOLATE SERPL-MCNC: 5.5 NG/ML — SIGNIFICANT CHANGE UP
FOLATE SERPL-MCNC: 5.5 NG/ML — SIGNIFICANT CHANGE UP
GLUCOSE SERPL-MCNC: 124 MG/DL — HIGH (ref 70–99)
GLUCOSE SERPL-MCNC: 124 MG/DL — HIGH (ref 70–99)
HCT VFR BLD CALC: 40.1 % — SIGNIFICANT CHANGE UP (ref 34.5–45)
HCT VFR BLD CALC: 40.1 % — SIGNIFICANT CHANGE UP (ref 34.5–45)
HGB BLD-MCNC: 12.7 G/DL — SIGNIFICANT CHANGE UP (ref 11.5–15.5)
HGB BLD-MCNC: 12.7 G/DL — SIGNIFICANT CHANGE UP (ref 11.5–15.5)
MCHC RBC-ENTMCNC: 27.1 PG — SIGNIFICANT CHANGE UP (ref 27–34)
MCHC RBC-ENTMCNC: 27.1 PG — SIGNIFICANT CHANGE UP (ref 27–34)
MCHC RBC-ENTMCNC: 31.7 GM/DL — LOW (ref 32–36)
MCHC RBC-ENTMCNC: 31.7 GM/DL — LOW (ref 32–36)
MCV RBC AUTO: 85.5 FL — SIGNIFICANT CHANGE UP (ref 80–100)
MCV RBC AUTO: 85.5 FL — SIGNIFICANT CHANGE UP (ref 80–100)
MRSA PCR RESULT.: SIGNIFICANT CHANGE UP
MRSA PCR RESULT.: SIGNIFICANT CHANGE UP
NRBC # BLD: 0 /100 WBCS — SIGNIFICANT CHANGE UP (ref 0–0)
NRBC # BLD: 0 /100 WBCS — SIGNIFICANT CHANGE UP (ref 0–0)
PLATELET # BLD AUTO: 178 K/UL — SIGNIFICANT CHANGE UP (ref 150–400)
PLATELET # BLD AUTO: 178 K/UL — SIGNIFICANT CHANGE UP (ref 150–400)
POTASSIUM SERPL-MCNC: 3.1 MMOL/L — LOW (ref 3.5–5.3)
POTASSIUM SERPL-MCNC: 3.1 MMOL/L — LOW (ref 3.5–5.3)
POTASSIUM SERPL-SCNC: 3.1 MMOL/L — LOW (ref 3.5–5.3)
POTASSIUM SERPL-SCNC: 3.1 MMOL/L — LOW (ref 3.5–5.3)
RBC # BLD: 4.69 M/UL — SIGNIFICANT CHANGE UP (ref 3.8–5.2)
RBC # BLD: 4.69 M/UL — SIGNIFICANT CHANGE UP (ref 3.8–5.2)
RBC # FLD: 16.4 % — HIGH (ref 10.3–14.5)
RBC # FLD: 16.4 % — HIGH (ref 10.3–14.5)
S AUREUS DNA NOSE QL NAA+PROBE: SIGNIFICANT CHANGE UP
S AUREUS DNA NOSE QL NAA+PROBE: SIGNIFICANT CHANGE UP
SODIUM SERPL-SCNC: 134 MMOL/L — LOW (ref 135–145)
SODIUM SERPL-SCNC: 134 MMOL/L — LOW (ref 135–145)
T PALLIDUM AB TITR SER: NEGATIVE — SIGNIFICANT CHANGE UP
T PALLIDUM AB TITR SER: NEGATIVE — SIGNIFICANT CHANGE UP
VIT B12 SERPL-MCNC: >2000 PG/ML — HIGH (ref 232–1245)
VIT B12 SERPL-MCNC: >2000 PG/ML — HIGH (ref 232–1245)
WBC # BLD: 6.7 K/UL — SIGNIFICANT CHANGE UP (ref 3.8–10.5)
WBC # BLD: 6.7 K/UL — SIGNIFICANT CHANGE UP (ref 3.8–10.5)
WBC # FLD AUTO: 6.7 K/UL — SIGNIFICANT CHANGE UP (ref 3.8–10.5)
WBC # FLD AUTO: 6.7 K/UL — SIGNIFICANT CHANGE UP (ref 3.8–10.5)

## 2024-01-12 PROCEDURE — 99232 SBSQ HOSP IP/OBS MODERATE 35: CPT

## 2024-01-12 RX ORDER — POTASSIUM CHLORIDE 20 MEQ
40 PACKET (EA) ORAL ONCE
Refills: 0 | Status: COMPLETED | OUTPATIENT
Start: 2024-01-12 | End: 2024-01-12

## 2024-01-12 RX ORDER — KETOROLAC TROMETHAMINE 30 MG/ML
15 SYRINGE (ML) INJECTION EVERY 6 HOURS
Refills: 0 | Status: DISCONTINUED | OUTPATIENT
Start: 2024-01-12 | End: 2024-01-14

## 2024-01-12 RX ORDER — OXYCODONE HYDROCHLORIDE 5 MG/1
2.5 TABLET ORAL EVERY 4 HOURS
Refills: 0 | Status: DISCONTINUED | OUTPATIENT
Start: 2024-01-12 | End: 2024-01-14

## 2024-01-12 RX ORDER — OXYCODONE HYDROCHLORIDE 5 MG/1
5 TABLET ORAL EVERY 4 HOURS
Refills: 0 | Status: DISCONTINUED | OUTPATIENT
Start: 2024-01-12 | End: 2024-01-14

## 2024-01-12 RX ORDER — SODIUM CHLORIDE 9 MG/ML
1000 INJECTION, SOLUTION INTRAVENOUS
Refills: 0 | Status: DISCONTINUED | OUTPATIENT
Start: 2024-01-12 | End: 2024-01-13

## 2024-01-12 RX ADMIN — PIPERACILLIN AND TAZOBACTAM 25 GRAM(S): 4; .5 INJECTION, POWDER, LYOPHILIZED, FOR SOLUTION INTRAVENOUS at 05:23

## 2024-01-12 RX ADMIN — ENOXAPARIN SODIUM 40 MILLIGRAM(S): 100 INJECTION SUBCUTANEOUS at 05:23

## 2024-01-12 RX ADMIN — MEMANTINE HYDROCHLORIDE 10 MILLIGRAM(S): 10 TABLET ORAL at 05:22

## 2024-01-12 RX ADMIN — Medication 15 MILLIGRAM(S): at 21:49

## 2024-01-12 RX ADMIN — Medication 3 MILLIGRAM(S): at 21:53

## 2024-01-12 RX ADMIN — PIPERACILLIN AND TAZOBACTAM 25 GRAM(S): 4; .5 INJECTION, POWDER, LYOPHILIZED, FOR SOLUTION INTRAVENOUS at 13:40

## 2024-01-12 RX ADMIN — CHLORHEXIDINE GLUCONATE 15 MILLILITER(S): 213 SOLUTION TOPICAL at 05:23

## 2024-01-12 RX ADMIN — Medication 40 MILLIEQUIVALENT(S): at 18:03

## 2024-01-12 RX ADMIN — CHLORHEXIDINE GLUCONATE 15 MILLILITER(S): 213 SOLUTION TOPICAL at 21:49

## 2024-01-12 RX ADMIN — GABAPENTIN 100 MILLIGRAM(S): 400 CAPSULE ORAL at 05:22

## 2024-01-12 RX ADMIN — GABAPENTIN 100 MILLIGRAM(S): 400 CAPSULE ORAL at 21:49

## 2024-01-12 RX ADMIN — PIPERACILLIN AND TAZOBACTAM 25 GRAM(S): 4; .5 INJECTION, POWDER, LYOPHILIZED, FOR SOLUTION INTRAVENOUS at 21:49

## 2024-01-12 RX ADMIN — MORPHINE SULFATE 4 MILLIGRAM(S): 50 CAPSULE, EXTENDED RELEASE ORAL at 05:55

## 2024-01-12 RX ADMIN — GABAPENTIN 100 MILLIGRAM(S): 400 CAPSULE ORAL at 13:40

## 2024-01-12 RX ADMIN — SENNA PLUS 2 TABLET(S): 8.6 TABLET ORAL at 21:49

## 2024-01-12 RX ADMIN — PANTOPRAZOLE SODIUM 40 MILLIGRAM(S): 20 TABLET, DELAYED RELEASE ORAL at 05:22

## 2024-01-12 RX ADMIN — MORPHINE SULFATE 4 MILLIGRAM(S): 50 CAPSULE, EXTENDED RELEASE ORAL at 05:25

## 2024-01-12 RX ADMIN — MEMANTINE HYDROCHLORIDE 10 MILLIGRAM(S): 10 TABLET ORAL at 17:38

## 2024-01-12 RX ADMIN — Medication 15 MILLIGRAM(S): at 22:19

## 2024-01-12 RX ADMIN — Medication 15 MILLIGRAM(S): at 17:37

## 2024-01-12 NOTE — PROGRESS NOTE ADULT - PROBLEM SELECTOR PLAN 3
h/o mci/dementia (alzheimer), anx/dep/insomnia  outpatient neuro and int med documentation reviewed  patient with progressively worsening decline in short term memory  cont home memantine (was being planned for starting rivastigmine and dc'ing memantine)  neuro follow up upon discharge  Thyroid Stimulating Hormone, Serum: 1.71 uIU/mL (01.11.24 @ 05:55)  will check b12, folate, rpr h/o mci/dementia (alzheimer), anx/dep/insomnia  outpatient neuro and int med documentation reviewed  patient with progressively worsening decline in short term memory  cont home memantine (was being planned for starting rivastigmine and dc'ing memantine)  neuro follow up upon discharge  Thyroid Stimulating Hormone, Serum: 1.71 uIU/mL (01.11.24 @ 05:55)  Vitamin B12, Serum: >2000 pg/mL (01.12.24 @ 07:24)  Folate, Serum: 5.5 ng/mL (01.12.24 @ 07:24)  RPR pending

## 2024-01-12 NOTE — DIETITIAN INITIAL EVALUATION ADULT - REASON FOR ADMISSION
Localized swelling, mass, and lump of head    Chart reviewed, events noted This is a 73y old  Female who presents with a chief complaint of facial swelling

## 2024-01-12 NOTE — SWALLOW BEDSIDE ASSESSMENT ADULT - SLP GENERAL OBSERVATIONS
Pt encountered in bed, awake/alert, on room air. Ox4, though forgetful at times. Follows directives. Subtle dysarthria; intelligible.

## 2024-01-12 NOTE — PROGRESS NOTE ADULT - PROBLEM SELECTOR PLAN 2
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  imaging shows "bony erosive and sclerotic changes involving the lingual cortex of the left mandibular body, likely due to provided history of osteonecrosis."  pain control

## 2024-01-12 NOTE — PROGRESS NOTE ADULT - ASSESSMENT
73 F with pmhx memory issues and osteoporosis presents ED complaining of worsening facial swelling and pain. Hypodense lesion with irregular ri of enhancement centered in the right aspect of the floor of mouth measuring approximately 3.3 x 2.4 x 2.6 cm. Clinical presentation c/w abscess v necrotic mass. Given normal WBC, febrile low concern for acute infection. Given pts clinical exam w/ firm floor of mouth, neck mass and weight loss concern for neoplastic process. Now s/p bedside incision and drainage of floor of mouth collection with 2x cultures, saline washout, and subperiosteal dissection along lingual aspect of anterior mandible. Pt progressing well in immediate post-operative period.     Plan:  - C/w IV abx  - Multimodal pain control  - OMFS to follow closely  - F/u cultures for s/r of abx  - Appreciate rest of care per primary team    Rajiv Perez  Oral and Maxillofacial Surgery  [facility] OMFS Pager #[phone]  [facility] Pager: [phone]  [facility] Pager: [phone]  Available on Teams 73 F with pmhx memory issues and osteoporosis presents ED complaining of worsening facial swelling and pain. Hypodense lesion with irregular ri of enhancement centered in the right aspect of the floor of mouth measuring approximately 3.3 x 2.4 x 2.6 cm. Clinical presentation c/w abscess v necrotic mass. Given normal WBC, febrile low concern for acute infection. Given pts clinical exam w/ firm floor of mouth, neck mass and weight loss concern for neoplastic process. Now s/p bedside incision and drainage of floor of mouth collection with 2x cultures, saline washout, and subperiosteal dissection along lingual aspect of anterior mandible. Pt progressing well in immediate post-operative period.     Plan:  - C/w IV abx  - Multimodal pain control  - OMFS to follow closely  - F/u cultures for s/r of abx  - Appreciate rest of care per primary team    Rajiv Perez  Oral and Maxillofacial Surgery  [facility] OMFS Pager #[phone]  [facility] Pager: [phone]  [facility] Pager: [phone]  Available on Teams

## 2024-01-12 NOTE — SWALLOW BEDSIDE ASSESSMENT ADULT - ASR SWALLOW LINGUAL MOBILITY
Right posterior portion of the tongue with slight rounded/ edematous appearance/impaired protrusion/impaired anterior elevation/impaired left lateral movement/impaired right lateral movement

## 2024-01-12 NOTE — PROGRESS NOTE ADULT - PROBLEM SELECTOR PLAN 1
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, but superimposed infection and abscess formation is not excluded."  OMFS and ENT are folllowing   PT needs a biopsy +/- aspiration --> IR recommends ENT --> ENT recommends OMFS  c/w pain control  puree diet 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, but superimposed infection and abscess formation is not excluded."  s/p I&D - subperosteal dissection along lingual aspect of anterior mandible, washed out with saline  Pain control- standing toradol, oxycodone PRN  Peridex mouth wash BID  will f/u cultures   puree diet for now, advance to regular as tolerated

## 2024-01-12 NOTE — SWALLOW BEDSIDE ASSESSMENT ADULT - SWALLOW EVAL: DIAGNOSIS
72 y/o w/ MCI w/ worsening facial swelling/ pain, found w/ FOM lesion concerning for abscess vs necrotic mass; s/p drainage. Pt presents with an oral dysphagia 2/2 reduced lingual ROM, resulting in reduced bolus prep & delayed oral transit; more evident with purees, improved with liquids. Pt also reporting pain along  the jaw/ tongue while swallowing. Pharyngeal phase of swallow WFL; no signs of aspiration. While swallow safety appears preserved, swallow efficiency is impaired, which may lead to malnutrition. RD f/u warranted for liquid supplement recommendations to promote oral intake. 74 y/o w/ MCI w/ worsening facial swelling/ pain, found w/ FOM lesion concerning for abscess vs necrotic mass; s/p drainage. Pt presents with an oral dysphagia 2/2 reduced lingual ROM, resulting in reduced bolus prep & delayed oral transit; more evident with purees, improved with liquids. Pt also reporting pain along  the jaw/ tongue while swallowing. Pharyngeal phase of swallow WFL; no signs of aspiration. While swallow safety appears preserved, swallow efficiency is impaired, which may lead to malnutrition. RD f/u warranted for liquid supplement recommendations to promote oral intake.

## 2024-01-12 NOTE — DIETITIAN INITIAL EVALUATION ADULT - ADD RECOMMEND
1) Defer diet texture/consistency to team/ Speech Language Pathologist. 2) Recommend addition of Ensure Plus High Protein 2x daily. 3) RD to add Magic Cup 1x daily. 3) Obtain/honor food preferences as able.

## 2024-01-12 NOTE — DIETITIAN INITIAL EVALUATION ADULT - PERTINENT MEDS FT
MEDICATIONS  (STANDING):  chlorhexidine 0.12% Liquid 15 milliLiter(s) Swish and Spit two times a day  dextrose 5% + sodium chloride 0.45%. 1000 milliLiter(s) (80 mL/Hr) IV Continuous <Continuous>  enoxaparin Injectable 40 milliGRAM(s) SubCutaneous every 24 hours  gabapentin 100 milliGRAM(s) Oral three times a day  ketorolac   Injectable 15 milliGRAM(s) IV Push every 6 hours  memantine 10 milliGRAM(s) Oral two times a day  naloxone Injectable 0.4 milliGRAM(s) IV Push once  pantoprazole    Tablet 40 milliGRAM(s) Oral before breakfast  piperacillin/tazobactam IVPB.. 3.375 Gram(s) IV Intermittent every 8 hours  polyethylene glycol 3350 17 Gram(s) Oral daily  potassium chloride   Powder 40 milliEquivalent(s) Oral once  senna 2 Tablet(s) Oral at bedtime    MEDICATIONS  (PRN):  acetaminophen     Tablet .. 650 milliGRAM(s) Oral every 6 hours PRN Temp greater or equal to 38C (100.4F), Mild Pain (1 - 3)  aluminum hydroxide/magnesium hydroxide/simethicone Suspension 30 milliLiter(s) Oral every 4 hours PRN Dyspepsia  bisacodyl 5 milliGRAM(s) Oral daily PRN Constipation  melatonin 3 milliGRAM(s) Oral at bedtime PRN Insomnia  ondansetron Injectable 4 milliGRAM(s) IV Push every 8 hours PRN Nausea and/or Vomiting  oxyCODONE    IR 2.5 milliGRAM(s) Oral every 4 hours PRN Moderate Pain (4 - 6)  oxyCODONE    IR 5 milliGRAM(s) Oral every 4 hours PRN Severe Pain (7 - 10)

## 2024-01-12 NOTE — DIETITIAN INITIAL EVALUATION ADULT - ORAL INTAKE PTA/DIET HISTORY
Pt reports decrease PO intake, worsening over the last  days PTA to tongue swelling, difficulty chewing. NKFA. Pt denies nausea, vomiting, diarrhea, constipation.

## 2024-01-12 NOTE — PROGRESS NOTE ADULT - SUBJECTIVE AND OBJECTIVE BOX
HPI: 73 F with pmhx memory issues and osteoporosis presents ED complaining of worsening facial swelling and pain.  Patient reports that in September 2023 she had a tooth extracted and then developed MRONJ  thought to be related to Prolia injections.  She reports that she has been following with both OMFS Dr. Etienne Cooper who performed a washout procedure in November 2023 and with ENT Dr. Urbano Andujar as well as her PCP.  She reports that over the last month has had increased swelling to the lower jaw area with increased pain which acutely progressed 4 days ago. She was prescribed Biaxin which she has been taking for approximately 1 week without much relief. Saw ENT yesterday who recommended patient come to the emergency department for IV antibiotics. She has not seen her oral surgeon since procedure.  CT+  hypodense lesion with irregular rind of enhancement centered in the right aspect of the floor of mouth measuring approximately 3.3 x 2.4 x 2.6 cm. Patient reports that she has trismus, dysphagia, odynophagia and difficulty eating secondary to swelling and has lost several pounds.  She denies fevers, chills, difficulty breathing, chest pain, shortness of breath abdominal pain nausea or vomiting. Pt on Zosyn in ED. Now s/p bedside incision and drainage of floor of mouth collection with 2x cultures, saline washout, and subperiosteal dissection along lingual aspect of anterior mandible.     Interval Events: Patient examined bedside resting comfortably, sublingual area firm, hemostatic, non-fluctuant, and no purulence noticed. Pt endorses pain being controlled and does not exhibit trismus. Denies difficulty breathing. Still on IV Zosyn.     Vitals:     Vital Signs Last 24 Hrs  T(C): 36.7 (12 Jan 2024 04:19), Max: 37.1 (11 Jan 2024 09:00)  T(F): 98 (12 Jan 2024 04:19), Max: 98.7 (11 Jan 2024 09:00)  HR: 73 (12 Jan 2024 04:19) (73 - 86)  BP: 114/71 (12 Jan 2024 04:19) (111/71 - 132/83)  BP(mean): --  RR: 18 (12 Jan 2024 04:19) (18 - 18)  SpO2: 97% (12 Jan 2024 04:19) (96% - 99%)    Parameters below as of 12 Jan 2024 04:19  Patient On (Oxygen Delivery Method): room air        I&O's Detail    11 Jan 2024 07:01  -  12 Jan 2024 07:00  --------------------------------------------------------  IN:    dextrose 5% + sodium chloride 0.45%: 960 mL  Total IN: 960 mL    OUT:    Voided (mL): 450 mL  Total OUT: 450 mL    Total NET: 510 mL          Medications:    MEDICATIONS  (STANDING):  chlorhexidine 0.12% Liquid 15 milliLiter(s) Swish and Spit two times a day  dextrose 5% + sodium chloride 0.45%. 1000 milliLiter(s) (80 mL/Hr) IV Continuous <Continuous>  dextrose 5% + sodium chloride 0.45%. 1000 milliLiter(s) (80 mL/Hr) IV Continuous <Continuous>  enoxaparin Injectable 40 milliGRAM(s) SubCutaneous every 24 hours  gabapentin 100 milliGRAM(s) Oral three times a day  memantine 10 milliGRAM(s) Oral two times a day  naloxone Injectable 0.4 milliGRAM(s) IV Push once  pantoprazole    Tablet 40 milliGRAM(s) Oral before breakfast  piperacillin/tazobactam IVPB.. 3.375 Gram(s) IV Intermittent every 8 hours  polyethylene glycol 3350 17 Gram(s) Oral daily  senna 2 Tablet(s) Oral at bedtime    MEDICATIONS  (PRN):  acetaminophen     Tablet .. 650 milliGRAM(s) Oral every 6 hours PRN Temp greater or equal to 38C (100.4F), Mild Pain (1 - 3)  aluminum hydroxide/magnesium hydroxide/simethicone Suspension 30 milliLiter(s) Oral every 4 hours PRN Dyspepsia  bisacodyl 5 milliGRAM(s) Oral daily PRN Constipation  ketorolac   Injectable 15 milliGRAM(s) IV Push every 6 hours PRN Moderate Pain (4 - 6)  melatonin 3 milliGRAM(s) Oral at bedtime PRN Insomnia  morphine  - Injectable 4 milliGRAM(s) IV Push every 4 hours PRN Severe Pain (7 - 10)  ondansetron Injectable 4 milliGRAM(s) IV Push every 8 hours PRN Nausea and/or Vomiting    Labs:                        11.6   8.49  )-----------( 161      ( 11 Jan 2024 05:55 )             36.4     01-11    137  |  99  |  16  ----------------------------<  85  3.5   |  23  |  0.65    Ca    8.7      11 Jan 2024 05:55  Phos  1.8     01-11  Mg     1.9     01-11    TPro  6.5  /  Alb  3.6  /  TBili  0.9  /  DBili  x   /  AST  16  /  ALT  7<L>  /  AlkPhos  60  01-11    Physical Exam:   Gen: AAOx3, NAD   Head: firmness and tenderness to palpation submental region.  Eyes: EOMI, PERRL, visual acuity intact, no diplopia  Ears: Gross hearing intact,  no otorrhea  Nose: No asymmetry, no rhinorrhea  Malar: No CN V-2 paresthesia   Throat: No LAD, supple, trachea midline  Extraoral/Intraoral Exam: CHRISTOPH: >40mm, dentition grossly intact, no CN V-3 paresthesia, floor of mouth firm and raised, tenderness of FOM which is likely 2/2 post-operative period, Inferior border of the mandible is palpable bilaterally, no exposed bone noted in site of previous MRONJ

## 2024-01-12 NOTE — DIETITIAN INITIAL EVALUATION ADULT - ENERGY INTAKE
Poor (<50%) In-house pt seen by Speech Language Pathologist today (1/12), recommend for pureed diet. Pt reports only having apple sauce so far. Discussed with patient importance of PO intake and prioritizing sources of protein to maintain lean body mass. Pt agreeable to trial of Ensure Shakes, magic cup and will also add yogurt to trays. Pt reports cold items are better tolerated than hot items. Obtained food preferences, will honor as able

## 2024-01-12 NOTE — PROGRESS NOTE ADULT - SUBJECTIVE AND OBJECTIVE BOX
C A R D I O L O G Y  **********************************     DATE OF SERVICE: 01-12-24    Patient c/o mouth pain. denies chest pain, palpitations, or shortness of breath.   Review of systems otherwise negative.  	  MEDICATIONS:  MEDICATIONS  (STANDING):  chlorhexidine 0.12% Liquid 15 milliLiter(s) Swish and Spit two times a day  dextrose 5% + sodium chloride 0.45%. 1000 milliLiter(s) (80 mL/Hr) IV Continuous <Continuous>  dextrose 5% + sodium chloride 0.45%. 1000 milliLiter(s) (80 mL/Hr) IV Continuous <Continuous>  enoxaparin Injectable 40 milliGRAM(s) SubCutaneous every 24 hours  gabapentin 100 milliGRAM(s) Oral three times a day  memantine 10 milliGRAM(s) Oral two times a day  naloxone Injectable 0.4 milliGRAM(s) IV Push once  pantoprazole    Tablet 40 milliGRAM(s) Oral before breakfast  piperacillin/tazobactam IVPB.. 3.375 Gram(s) IV Intermittent every 8 hours  polyethylene glycol 3350 17 Gram(s) Oral daily  potassium chloride   Powder 40 milliEquivalent(s) Oral once  senna 2 Tablet(s) Oral at bedtime      LABS:	 	    CARDIAC MARKERS:                                12.7   6.70  )-----------( 178      ( 12 Jan 2024 07:24 )             40.1     Hemoglobin: 12.7 g/dL (01-12 @ 07:24)  Hemoglobin: 11.6 g/dL (01-11 @ 05:55)  Hemoglobin: 11.7 g/dL (01-10 @ 16:14)      01-12    134<L>  |  97  |  9   ----------------------------<  124<H>  3.1<L>   |  27  |  0.56    Ca    8.2<L>      12 Jan 2024 07:25  Phos  1.8     01-11  Mg     1.9     01-11    TPro  6.5  /  Alb  3.6  /  TBili  0.9  /  DBili  x   /  AST  16  /  ALT  7<L>  /  AlkPhos  60  01-11    Creatinine Trend: 0.56<--, 0.65<--, 0.75<--    COAGS:       proBNP:   Lipid Profile:   HgA1c:   TSH:       PHYSICAL EXAM:  T(C): 37 (01-12-24 @ 08:28), Max: 37 (01-11-24 @ 20:39)  HR: 100 (01-12-24 @ 08:28) (73 - 100)  BP: 125/73 (01-12-24 @ 08:28) (114/71 - 132/83)  RR: 18 (01-12-24 @ 08:28) (18 - 18)  SpO2: 96% (01-12-24 @ 08:28) (96% - 99%)  Wt(kg): --  I&O's Summary    11 Jan 2024 07:01  -  12 Jan 2024 07:00  --------------------------------------------------------  IN: 960 mL / OUT: 450 mL / NET: 510 mL    12 Jan 2024 07:01  -  12 Jan 2024 11:37  --------------------------------------------------------  IN: 240 mL / OUT: 0 mL / NET: 240 mL          Gen: NAD  HEENT:  (-)icterus (-)pallor  CV: N S1 S2 1/6 MANUEL (+)2 Pulses B/l  Resp:  Clear to auscultation B/L, normal effort  GI: (+) BS Soft, NT, ND  Lymph:  (-)Edema, (-)obvious lymphadenopathy  Skin: Warm to touch, Normal turgor  Psych: Appropriate mood and affect      TELEMETRY: None	      ASSESSMENT/PLAN: Patient is a 74 y/o female well known to our office with PMH of mci/dementia (alzheimer), anx/dep/insomnia, gerd, osteoporosis, fibromyalgia, ibs-c c/b diverticulosis + hemorrhoids who is admitted with facial swelling found to have necrotic mass in the right anterior floor the mouth.    #Necrotic Mass in mouth  - Workup per ENT/IR/OMFS - s/p I&D  - Based on patient's RCRI score of 0, would consider her low cardiac risk for any planned procedures/biopsies. Patient is optimized from CV to proceed. No evidence of clinical HF or unstable cardiac syndromes. She had office echo with normal LV/RV function in 1/2023 and normal exercise NST with no ischemia in 7/2021.    #Palpitations  - EKG with SR and PACs  - No arrhythmia on prior office holter monitor. Recommend repeat outpatient holter monitor    - No further inpatient cardiac w/u planned  - Patient to f/u with Dr. Duffy on 3/12 at 3:30 PM    Checo Campo PA-C  Pager: [phone]       C A R D I O L O G Y  **********************************     DATE OF SERVICE: 01-12-24    Patient c/o mouth pain. denies chest pain, palpitations, or shortness of breath.   Review of systems otherwise negative.  	  MEDICATIONS:  MEDICATIONS  (STANDING):  chlorhexidine 0.12% Liquid 15 milliLiter(s) Swish and Spit two times a day  dextrose 5% + sodium chloride 0.45%. 1000 milliLiter(s) (80 mL/Hr) IV Continuous <Continuous>  dextrose 5% + sodium chloride 0.45%. 1000 milliLiter(s) (80 mL/Hr) IV Continuous <Continuous>  enoxaparin Injectable 40 milliGRAM(s) SubCutaneous every 24 hours  gabapentin 100 milliGRAM(s) Oral three times a day  memantine 10 milliGRAM(s) Oral two times a day  naloxone Injectable 0.4 milliGRAM(s) IV Push once  pantoprazole    Tablet 40 milliGRAM(s) Oral before breakfast  piperacillin/tazobactam IVPB.. 3.375 Gram(s) IV Intermittent every 8 hours  polyethylene glycol 3350 17 Gram(s) Oral daily  potassium chloride   Powder 40 milliEquivalent(s) Oral once  senna 2 Tablet(s) Oral at bedtime      LABS:	 	    CARDIAC MARKERS:                                12.7   6.70  )-----------( 178      ( 12 Jan 2024 07:24 )             40.1     Hemoglobin: 12.7 g/dL (01-12 @ 07:24)  Hemoglobin: 11.6 g/dL (01-11 @ 05:55)  Hemoglobin: 11.7 g/dL (01-10 @ 16:14)      01-12    134<L>  |  97  |  9   ----------------------------<  124<H>  3.1<L>   |  27  |  0.56    Ca    8.2<L>      12 Jan 2024 07:25  Phos  1.8     01-11  Mg     1.9     01-11    TPro  6.5  /  Alb  3.6  /  TBili  0.9  /  DBili  x   /  AST  16  /  ALT  7<L>  /  AlkPhos  60  01-11    Creatinine Trend: 0.56<--, 0.65<--, 0.75<--    COAGS:       proBNP:   Lipid Profile:   HgA1c:   TSH:       PHYSICAL EXAM:  T(C): 37 (01-12-24 @ 08:28), Max: 37 (01-11-24 @ 20:39)  HR: 100 (01-12-24 @ 08:28) (73 - 100)  BP: 125/73 (01-12-24 @ 08:28) (114/71 - 132/83)  RR: 18 (01-12-24 @ 08:28) (18 - 18)  SpO2: 96% (01-12-24 @ 08:28) (96% - 99%)  Wt(kg): --  I&O's Summary    11 Jan 2024 07:01  -  12 Jan 2024 07:00  --------------------------------------------------------  IN: 960 mL / OUT: 450 mL / NET: 510 mL    12 Jan 2024 07:01  -  12 Jan 2024 11:37  --------------------------------------------------------  IN: 240 mL / OUT: 0 mL / NET: 240 mL          Gen: NAD  HEENT:  (-)icterus (-)pallor  CV: N S1 S2 1/6 MANUEL (+)2 Pulses B/l  Resp:  Clear to auscultation B/L, normal effort  GI: (+) BS Soft, NT, ND  Lymph:  (-)Edema, (-)obvious lymphadenopathy  Skin: Warm to touch, Normal turgor  Psych: Appropriate mood and affect      TELEMETRY: None	      ASSESSMENT/PLAN: Patient is a 74 y/o female well known to our office with PMH of mci/dementia (alzheimer), anx/dep/insomnia, gerd, osteoporosis, fibromyalgia, ibs-c c/b diverticulosis + hemorrhoids who is admitted with facial swelling found to have necrotic mass in the right anterior floor the mouth.    #Necrotic Mass in mouth  - Workup per ENT/IR/OMFS - s/p I&D  - Based on patient's RCRI score of 0, would consider her low cardiac risk for any planned procedures/biopsies. Patient is optimized from CV to proceed. No evidence of clinical HF or unstable cardiac syndromes. She had office echo with normal LV/RV function in 1/2023 and normal exercise NST with no ischemia in 7/2021.    #Palpitations  - EKG with SR and PACs  - No arrhythmia on prior office holter monitor. Recommend repeat outpatient holter monitor    - No further inpatient cardiac w/u planned  - Patient to f/u with Dr. Duffy on 3/12 at 3:30 PM    Checo Campo PA-C  Pager: [phone]

## 2024-01-12 NOTE — PROGRESS NOTE ADULT - SUBJECTIVE AND OBJECTIVE BOX
Andre Reyes, M.D.  Pager: [phone]  Office: [phone]    Patient is a 73y old  Female who presents with a chief complaint of facial swelling (12 Jan 2024 07:02)          SUBJECTIVE / OVERNIGHT EVENTS:    No acute overnight events.    ROS: ( - ) Fever, ( - )Chills,  ( - )Nausea/Vomiting, ( - ) Cough, ( - )Shortness of breath, ( - )Chest Pain    MEDICATIONS  (STANDING):  chlorhexidine 0.12% Liquid 15 milliLiter(s) Swish and Spit two times a day  dextrose 5% + sodium chloride 0.45%. 1000 milliLiter(s) (80 mL/Hr) IV Continuous <Continuous>  dextrose 5% + sodium chloride 0.45%. 1000 milliLiter(s) (80 mL/Hr) IV Continuous <Continuous>  enoxaparin Injectable 40 milliGRAM(s) SubCutaneous every 24 hours  gabapentin 100 milliGRAM(s) Oral three times a day  memantine 10 milliGRAM(s) Oral two times a day  naloxone Injectable 0.4 milliGRAM(s) IV Push once  pantoprazole    Tablet 40 milliGRAM(s) Oral before breakfast  piperacillin/tazobactam IVPB.. 3.375 Gram(s) IV Intermittent every 8 hours  polyethylene glycol 3350 17 Gram(s) Oral daily  senna 2 Tablet(s) Oral at bedtime    MEDICATIONS  (PRN):  acetaminophen     Tablet .. 650 milliGRAM(s) Oral every 6 hours PRN Temp greater or equal to 38C (100.4F), Mild Pain (1 - 3)  aluminum hydroxide/magnesium hydroxide/simethicone Suspension 30 milliLiter(s) Oral every 4 hours PRN Dyspepsia  bisacodyl 5 milliGRAM(s) Oral daily PRN Constipation  ketorolac   Injectable 15 milliGRAM(s) IV Push every 6 hours PRN Moderate Pain (4 - 6)  melatonin 3 milliGRAM(s) Oral at bedtime PRN Insomnia  morphine  - Injectable 4 milliGRAM(s) IV Push every 4 hours PRN Severe Pain (7 - 10)  ondansetron Injectable 4 milliGRAM(s) IV Push every 8 hours PRN Nausea and/or Vomiting          T(C): 37 (01-12 @ 08:28), Max: 37 (01-11 @ 20:39)   HR: 100   BP: 125/73   RR: 18   SpO2: 96%    PHYSICAL EXAM:    CONSTITUTIONAL: NAD, well-developed, well-groomed  EYES: PERRLA; conjunctiva and sclera clear  ENMT: Moist oral mucosa, no pharyngeal injection or exudates; normal dentition  NECK: Supple, no palpable masses; no thyromegaly  RESPIRATORY: Normal respiratory effort; lungs are clear to auscultation bilaterally  CARDIOVASCULAR: Regular rate and rhythm, normal S1 and S2, no murmur/rub/gallop; No lower extremity edema; Peripheral pulses are 2+ bilaterally  ABDOMEN: Nontender to palpation, normoactive bowel sounds, no rebound/guarding; No hepatosplenomegaly  MUSCULOSKELETAL:  no clubbing or cyanosis of digits; no joint swelling or tenderness to palpation  PSYCH: A+O to person, place, and time; affect appropriate  NEUROLOGY: CN 2-12 are intact and symmetric; no gross sensory deficits   SKIN: No rashes; no palpable lesions      LABS:                        12.7   6.70  )-----------( 178      ( 12 Jan 2024 07:24 )             40.1      01-12    134<L>  |  97  |  9   ----------------------------<  124<H>  3.1<L>   |  27  |  0.56    Ca    8.2<L>      12 Jan 2024 07:25  Phos  1.8     01-11  Mg     1.9     01-11    TPro  6.5  /  Alb  3.6  /  TBili  0.9  /  DBili  x   /  AST  16  /  ALT  7<L>  /  AlkPhos  60  01-11       CAPILLARY BLOOD GLUCOSE          RADIOLOGY & ADDITIONAL TESTS:    Imaging Personally Reviewed:  Consultant(s) Notes Reviewed:    Care Discussed with Consultants/Other Providers:   Andre Reyes, M.D.  Pager: [phone]  Office: [phone]    Patient is a 73y old  Female who presents with a chief complaint of facial swelling (12 Jan 2024 07:02)          SUBJECTIVE / OVERNIGHT EVENTS:    No acute overnight events.    ROS: ( - ) Fever, ( - )Chills,  ( - )Nausea/Vomiting, ( - ) Cough, ( - )Shortness of breath, ( - )Chest Pain    MEDICATIONS  (STANDING):  chlorhexidine 0.12% Liquid 15 milliLiter(s) Swish and Spit two times a day  dextrose 5% + sodium chloride 0.45%. 1000 milliLiter(s) (80 mL/Hr) IV Continuous <Continuous>  dextrose 5% + sodium chloride 0.45%. 1000 milliLiter(s) (80 mL/Hr) IV Continuous <Continuous>  enoxaparin Injectable 40 milliGRAM(s) SubCutaneous every 24 hours  gabapentin 100 milliGRAM(s) Oral three times a day  memantine 10 milliGRAM(s) Oral two times a day  naloxone Injectable 0.4 milliGRAM(s) IV Push once  pantoprazole    Tablet 40 milliGRAM(s) Oral before breakfast  piperacillin/tazobactam IVPB.. 3.375 Gram(s) IV Intermittent every 8 hours  polyethylene glycol 3350 17 Gram(s) Oral daily  senna 2 Tablet(s) Oral at bedtime    MEDICATIONS  (PRN):  acetaminophen     Tablet .. 650 milliGRAM(s) Oral every 6 hours PRN Temp greater or equal to 38C (100.4F), Mild Pain (1 - 3)  aluminum hydroxide/magnesium hydroxide/simethicone Suspension 30 milliLiter(s) Oral every 4 hours PRN Dyspepsia  bisacodyl 5 milliGRAM(s) Oral daily PRN Constipation  ketorolac   Injectable 15 milliGRAM(s) IV Push every 6 hours PRN Moderate Pain (4 - 6)  melatonin 3 milliGRAM(s) Oral at bedtime PRN Insomnia  morphine  - Injectable 4 milliGRAM(s) IV Push every 4 hours PRN Severe Pain (7 - 10)  ondansetron Injectable 4 milliGRAM(s) IV Push every 8 hours PRN Nausea and/or Vomiting          T(C): 37 (01-12 @ 08:28), Max: 37 (01-11 @ 20:39)   HR: 100   BP: 125/73   RR: 18   SpO2: 96%    PHYSICAL EXAM:    CONSTITUTIONAL: NAD, well-developed, well-groomed  EYES: PERRLA; conjunctiva and sclera clear  ENMT: Moist oral mucosa, no pharyngeal injection or exudates; normal dentition  NECK: Supple, no palpable masses; no thyromegaly  RESPIRATORY: Normal respiratory effort; lungs are clear to auscultation bilaterally  CARDIOVASCULAR: Regular rate and rhythm, normal S1 and S2, no murmur/rub/gallop; No lower extremity edema; Peripheral pulses are 2+ bilaterally  ABDOMEN: Nontender to palpation, normoactive bowel sounds, no rebound/guarding; No hepatosplenomegaly  MUSCULOSKELETAL:  no clubbing or cyanosis of digits; no joint swelling or tenderness to palpation  PSYCH: A+O to person, place, and time; affect appropriate  NEUROLOGY: CN 2-12 are intact and symmetric; no gross sensory deficits   SKIN: No rashes; no palpable lesions      LABS:                        12.7   6.70  )-----------( 178      ( 12 Jan 2024 07:24 )             40.1      01-12    134<L>  |  97  |  9   ----------------------------<  124<H>  3.1<L>   |  27  |  0.56    Ca    8.2<L>      12 Jan 2024 07:25  Phos  1.8     01-11  Mg     1.9     01-11    TPro  6.5  /  Alb  3.6  /  TBili  0.9  /  DBili  x   /  AST  16  /  ALT  7<L>  /  AlkPhos  60  01-11       CAPILLARY BLOOD GLUCOSE          RADIOLOGY & ADDITIONAL TESTS:    Imaging Personally Reviewed:  Consultant(s) Notes Reviewed:    Care Discussed with Consultants/Other Providers:   Andre Reyes, M.D.  Pager: [phone]  Office: [phone]    Patient is a 73y old  Female who presents with a chief complaint of facial swelling (12 Jan 2024 07:02)          SUBJECTIVE / OVERNIGHT EVENTS:    No acute overnight events.  oral pain is improved after her I&D yesterday. she reports being able to swallow easier.    ROS: ( - ) Fever, ( - )Chills,  ( - )Nausea/Vomiting, ( - ) Cough, ( - )Shortness of breath, ( - )Chest Pain    MEDICATIONS  (STANDING):  chlorhexidine 0.12% Liquid 15 milliLiter(s) Swish and Spit two times a day  dextrose 5% + sodium chloride 0.45%. 1000 milliLiter(s) (80 mL/Hr) IV Continuous <Continuous>  dextrose 5% + sodium chloride 0.45%. 1000 milliLiter(s) (80 mL/Hr) IV Continuous <Continuous>  enoxaparin Injectable 40 milliGRAM(s) SubCutaneous every 24 hours  gabapentin 100 milliGRAM(s) Oral three times a day  memantine 10 milliGRAM(s) Oral two times a day  naloxone Injectable 0.4 milliGRAM(s) IV Push once  pantoprazole    Tablet 40 milliGRAM(s) Oral before breakfast  piperacillin/tazobactam IVPB.. 3.375 Gram(s) IV Intermittent every 8 hours  polyethylene glycol 3350 17 Gram(s) Oral daily  senna 2 Tablet(s) Oral at bedtime    MEDICATIONS  (PRN):  acetaminophen     Tablet .. 650 milliGRAM(s) Oral every 6 hours PRN Temp greater or equal to 38C (100.4F), Mild Pain (1 - 3)  aluminum hydroxide/magnesium hydroxide/simethicone Suspension 30 milliLiter(s) Oral every 4 hours PRN Dyspepsia  bisacodyl 5 milliGRAM(s) Oral daily PRN Constipation  ketorolac   Injectable 15 milliGRAM(s) IV Push every 6 hours PRN Moderate Pain (4 - 6)  melatonin 3 milliGRAM(s) Oral at bedtime PRN Insomnia  morphine  - Injectable 4 milliGRAM(s) IV Push every 4 hours PRN Severe Pain (7 - 10)  ondansetron Injectable 4 milliGRAM(s) IV Push every 8 hours PRN Nausea and/or Vomiting          T(C): 37 (01-12 @ 08:28), Max: 37 (01-11 @ 20:39)   HR: 100   BP: 125/73   RR: 18   SpO2: 96%    PHYSICAL EXAM:    CONSTITUTIONAL: NAD, well-developed, well-groomed  EYES: PERRLA; conjunctiva and sclera clear  ENMT: tongue swelling improved. submandibular swelling improved.  NECK: Supple, no palpable masses; no thyromegaly  RESPIRATORY: Normal respiratory effort; lungs are clear to auscultation bilaterally  CARDIOVASCULAR: Regular rate and rhythm, normal S1 and S2, no murmur/rub/gallop; No lower extremity edema; Peripheral pulses are 2+ bilaterally  ABDOMEN: Nontender to palpation, normoactive bowel sounds, no rebound/guarding; No hepatosplenomegaly  MUSCULOSKELETAL:  no clubbing or cyanosis of digits; no joint swelling or tenderness to palpation  PSYCH: A+O to person, place, and time; affect appropriate  NEUROLOGY: CN 2-12 are intact and symmetric; no gross sensory deficits   SKIN: No rashes; no palpable lesions      LABS:                        12.7   6.70  )-----------( 178      ( 12 Jan 2024 07:24 )             40.1      01-12    134<L>  |  97  |  9   ----------------------------<  124<H>  3.1<L>   |  27  |  0.56    Ca    8.2<L>      12 Jan 2024 07:25  Phos  1.8     01-11  Mg     1.9     01-11    TPro  6.5  /  Alb  3.6  /  TBili  0.9  /  DBili  x   /  AST  16  /  ALT  7<L>  /  AlkPhos  60  01-11       CAPILLARY BLOOD GLUCOSE          RADIOLOGY & ADDITIONAL TESTS:    Imaging Personally Reviewed:  Consultant(s) Notes Reviewed:    Care Discussed with Consultants/Other Providers:   Andre Reyes, M.D.  Pager: [phone]  Office: [phone]    Patient is a 73y old  Female who presents with a chief complaint of facial swelling (12 Jan 2024 07:02)          SUBJECTIVE / OVERNIGHT EVENTS:    No acute overnight events.  oral pain is improved after her I&D yesterday. she reports being able to swallow easier.    ROS: ( - ) Fever, ( - )Chills,  ( - )Nausea/Vomiting, ( - ) Cough, ( - )Shortness of breath, ( - )Chest Pain    MEDICATIONS  (STANDING):  chlorhexidine 0.12% Liquid 15 milliLiter(s) Swish and Spit two times a day  dextrose 5% + sodium chloride 0.45%. 1000 milliLiter(s) (80 mL/Hr) IV Continuous <Continuous>  dextrose 5% + sodium chloride 0.45%. 1000 milliLiter(s) (80 mL/Hr) IV Continuous <Continuous>  enoxaparin Injectable 40 milliGRAM(s) SubCutaneous every 24 hours  gabapentin 100 milliGRAM(s) Oral three times a day  memantine 10 milliGRAM(s) Oral two times a day  naloxone Injectable 0.4 milliGRAM(s) IV Push once  pantoprazole    Tablet 40 milliGRAM(s) Oral before breakfast  piperacillin/tazobactam IVPB.. 3.375 Gram(s) IV Intermittent every 8 hours  polyethylene glycol 3350 17 Gram(s) Oral daily  senna 2 Tablet(s) Oral at bedtime    MEDICATIONS  (PRN):  acetaminophen     Tablet .. 650 milliGRAM(s) Oral every 6 hours PRN Temp greater or equal to 38C (100.4F), Mild Pain (1 - 3)  aluminum hydroxide/magnesium hydroxide/simethicone Suspension 30 milliLiter(s) Oral every 4 hours PRN Dyspepsia  bisacodyl 5 milliGRAM(s) Oral daily PRN Constipation  ketorolac   Injectable 15 milliGRAM(s) IV Push every 6 hours PRN Moderate Pain (4 - 6)  melatonin 3 milliGRAM(s) Oral at bedtime PRN Insomnia  morphine  - Injectable 4 milliGRAM(s) IV Push every 4 hours PRN Severe Pain (7 - 10)  ondansetron Injectable 4 milliGRAM(s) IV Push every 8 hours PRN Nausea and/or Vomiting          T(C): 37 (01-12 @ 08:28), Max: 37 (01-11 @ 20:39)   HR: 100   BP: 125/73   RR: 18   SpO2: 96%    PHYSICAL EXAM:    CONSTITUTIONAL: NAD, well-developed, well-groomed  EYES: PERRLA; conjunctiva and sclera clear  ENMT: tongue swelling improved. submandibular swelling improved.  NECK: Supple, no palpable masses; no thyromegaly  RESPIRATORY: Normal respiratory effort; lungs are clear to auscultation bilaterally  CARDIOVASCULAR: Regular rate and rhythm, normal S1 and S2, no murmur/rub/gallop; No lower extremity edema; Peripheral pulses are 2+ bilaterally  ABDOMEN: Nontender to palpation, normoactive bowel sounds, no rebound/guarding; No hepatosplenomegaly  MUSCULOSKELETAL:  no clubbing or cyanosis of digits; no joint swelling or tenderness to palpation  PSYCH: A+O to person, place, and time; affect appropriate  NEUROLOGY: CN 2-12 are intact and symmetric; no gross sensory deficits   SKIN: No rashes; no palpable lesions      LABS:                        12.7   6.70  )-----------( 178      ( 12 Jan 2024 07:24 )             40.1      01-12    134<L>  |  97  |  9   ----------------------------<  124<H>  3.1<L>   |  27  |  0.56    Ca    8.2<L>      12 Jan 2024 07:25  Phos  1.8     01-11  Mg     1.9     01-11    TPro  6.5  /  Alb  3.6  /  TBili  0.9  /  DBili  x   /  AST  16  /  ALT  7<L>  /  AlkPhos  60  01-11       CAPILLARY BLOOD GLUCOSE          RADIOLOGY & ADDITIONAL TESTS:    Imaging Personally Reviewed:  Consultant(s) Notes Reviewed:    Care Discussed with Consultants/Other Providers:

## 2024-01-12 NOTE — PROGRESS NOTE ADULT - ASSESSMENT
CARDIOLOGY ATTENDING    Agree with above. Recommend outpatient Holter monitoring. F/U with [facility] after discharge as outlined above.

## 2024-01-12 NOTE — SWALLOW BEDSIDE ASSESSMENT ADULT - DIET PRIOR TO ADMI
Pt was eating purees for the past week (yogurt & applesauce); prior to that, softer/ moist foods; thin liquids

## 2024-01-12 NOTE — DIETITIAN INITIAL EVALUATION ADULT - PERTINENT LABORATORY DATA
01-12    134<L>  |  97  |  9   ----------------------------<  124<H>  3.1<L>   |  27  |  0.56    Ca    8.2<L>      12 Jan 2024 07:25  Phos  1.8     01-11  Mg     1.9     01-11    TPro  6.5  /  Alb  3.6  /  TBili  0.9  /  DBili  x   /  AST  16  /  ALT  7<L>  /  AlkPhos  60  01-11  A1C with Estimated Average Glucose Result: 5.9 % (01-11-24 @ 05:55)

## 2024-01-12 NOTE — SWALLOW BEDSIDE ASSESSMENT ADULT - SLP PERTINENT HISTORY OF CURRENT PROBLEM
72yo female with PMHx memory issues presents to ED complaining of worsened facial swelling discomfort.  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  She reports that she has been following with both [facility] Dr. Etienne Cooper who performed a washout procedure in November 2023 and with ENT Dr. Urbano Andujar as well as her PCP.  She reports that over the last month has had increased swelling to the lower jaw area with increased pain which acutely progressed 2 days ago.  She was prescribed Biaxin which she has been taking for approximately 1 week without much relief.  Saw ENT today who recommended patient come to the emergency department for IV antibiotics. She has not seen her oral surgeon since procedure. ENT called for airway evaluation.  Patient reports that she has difficulty opening her mouth and eating secondary to swelling and pain. and she has lost several pounds. 74yo female with PMHx memory issues presents to ED complaining of worsened facial swelling discomfort.  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  She reports that she has been following with both [facility] Dr. Etienne Cooper who performed a washout procedure in November 2023 and with ENT Dr. Urbano Andujar as well as her PCP.  She reports that over the last month has had increased swelling to the lower jaw area with increased pain which acutely progressed 2 days ago.  She was prescribed Biaxin which she has been taking for approximately 1 week without much relief.  Saw ENT today who recommended patient come to the emergency department for IV antibiotics. She has not seen her oral surgeon since procedure. ENT called for airway evaluation.  Patient reports that she has difficulty opening her mouth and eating secondary to swelling and pain. and she has lost several pounds.

## 2024-01-12 NOTE — PROGRESS NOTE ADULT - ASSESSMENT
72yo 53kg f w pmh mci/dementia (alzheimer), anx/dep/insomnia, gerd/lprd, osteoporosis, mronj, fibromyalgia, ibs-c c/b diverticulosis + hemorrhoids, p/w facial swelling a/w orofacial pain; in er, found to have necrotic mass in the floor of mouth; admit to medicine for further mgmt.

## 2024-01-12 NOTE — SWALLOW BEDSIDE ASSESSMENT ADULT - COMMENTS
She denies fevers, chills, difficulty breathing, chest pain, shortness of breath abdominal pain nausea or vomiting, stridor, hoarseness, dysphagia, or odynophagia. CT neck shows Peripherally enhancing necrotic mass within the anterior midline floor of   mouth. ?Abscess vs necrotic neoplasm. Bone erosion involving the left mandible which is adjacent to but appears separate from the soft tissue mass. While this likely represents osteonecrosis, the possibility of osteomyelitis or tumor infiltration is not excluded.    ENT called for airway evaluation.  On oral exam, mild trismus, sublingual swelling, tender to palpation on body of mandible. difficult to visualize posterior pharynx.  Flexible laryngoscopy exam unremarkable. No pooling of secretions, no suspicious masses or lesions, wide open airway. No vocal cord abnormalities.     OMFS on board ->  Given normal WBC, febrile low concern for acute infection. Given pts clinical exam w/ firm floor of mouth, neck mass and weight loss concern for neoplastic process. Plan: Admission to Medicine; Recommend IR consultation for core biopsy v aspiration; C/w IV abx; Multimodal pain control; OMFS to follow closely  1/11: S/p Bedside Incision and drainage of floor of mouth collection with OMFS. F/u cultures. Post-op care: patient to avoid straws, otherwise unrestricted diet as tolerated.     Swallow hx: Pt is new to this service. Pt utilizes liquid wash to aide with clearance of bolus

## 2024-01-13 LAB
MAGNESIUM SERPL-MCNC: 2 MG/DL — SIGNIFICANT CHANGE UP (ref 1.6–2.6)
MAGNESIUM SERPL-MCNC: 2 MG/DL — SIGNIFICANT CHANGE UP (ref 1.6–2.6)
PHOSPHATE SERPL-MCNC: 1.9 MG/DL — LOW (ref 2.5–4.5)
PHOSPHATE SERPL-MCNC: 1.9 MG/DL — LOW (ref 2.5–4.5)

## 2024-01-13 PROCEDURE — 99232 SBSQ HOSP IP/OBS MODERATE 35: CPT

## 2024-01-13 RX ORDER — SODIUM CHLORIDE 9 MG/ML
1000 INJECTION, SOLUTION INTRAVENOUS
Refills: 0 | Status: DISCONTINUED | OUTPATIENT
Start: 2024-01-13 | End: 2024-01-15

## 2024-01-13 RX ORDER — POTASSIUM CHLORIDE 20 MEQ
40 PACKET (EA) ORAL ONCE
Refills: 0 | Status: COMPLETED | OUTPATIENT
Start: 2024-01-13 | End: 2024-01-13

## 2024-01-13 RX ORDER — POTASSIUM PHOSPHATE, MONOBASIC POTASSIUM PHOSPHATE, DIBASIC 236; 224 MG/ML; MG/ML
30 INJECTION, SOLUTION INTRAVENOUS ONCE
Refills: 0 | Status: COMPLETED | OUTPATIENT
Start: 2024-01-13 | End: 2024-01-13

## 2024-01-13 RX ADMIN — CHLORHEXIDINE GLUCONATE 15 MILLILITER(S): 213 SOLUTION TOPICAL at 05:14

## 2024-01-13 RX ADMIN — PIPERACILLIN AND TAZOBACTAM 25 GRAM(S): 4; .5 INJECTION, POWDER, LYOPHILIZED, FOR SOLUTION INTRAVENOUS at 13:10

## 2024-01-13 RX ADMIN — PANTOPRAZOLE SODIUM 40 MILLIGRAM(S): 20 TABLET, DELAYED RELEASE ORAL at 05:15

## 2024-01-13 RX ADMIN — Medication 650 MILLIGRAM(S): at 17:50

## 2024-01-13 RX ADMIN — Medication 40 MILLIEQUIVALENT(S): at 13:11

## 2024-01-13 RX ADMIN — Medication 15 MILLIGRAM(S): at 05:15

## 2024-01-13 RX ADMIN — GABAPENTIN 100 MILLIGRAM(S): 400 CAPSULE ORAL at 05:15

## 2024-01-13 RX ADMIN — CHLORHEXIDINE GLUCONATE 15 MILLILITER(S): 213 SOLUTION TOPICAL at 17:49

## 2024-01-13 RX ADMIN — Medication 15 MILLIGRAM(S): at 15:13

## 2024-01-13 RX ADMIN — Medication 15 MILLIGRAM(S): at 09:39

## 2024-01-13 RX ADMIN — Medication 15 MILLIGRAM(S): at 05:45

## 2024-01-13 RX ADMIN — ENOXAPARIN SODIUM 40 MILLIGRAM(S): 100 INJECTION SUBCUTANEOUS at 05:15

## 2024-01-13 RX ADMIN — POTASSIUM PHOSPHATE, MONOBASIC POTASSIUM PHOSPHATE, DIBASIC 83.33 MILLIMOLE(S): 236; 224 INJECTION, SOLUTION INTRAVENOUS at 17:49

## 2024-01-13 RX ADMIN — Medication 15 MILLIGRAM(S): at 22:01

## 2024-01-13 RX ADMIN — Medication 15 MILLIGRAM(S): at 21:31

## 2024-01-13 RX ADMIN — OXYCODONE HYDROCHLORIDE 5 MILLIGRAM(S): 5 TABLET ORAL at 00:26

## 2024-01-13 RX ADMIN — OXYCODONE HYDROCHLORIDE 5 MILLIGRAM(S): 5 TABLET ORAL at 00:56

## 2024-01-13 RX ADMIN — MEMANTINE HYDROCHLORIDE 10 MILLIGRAM(S): 10 TABLET ORAL at 17:50

## 2024-01-13 RX ADMIN — POLYETHYLENE GLYCOL 3350 17 GRAM(S): 17 POWDER, FOR SOLUTION ORAL at 13:11

## 2024-01-13 RX ADMIN — Medication 40 MILLIEQUIVALENT(S): at 09:39

## 2024-01-13 RX ADMIN — SENNA PLUS 2 TABLET(S): 8.6 TABLET ORAL at 21:31

## 2024-01-13 RX ADMIN — PIPERACILLIN AND TAZOBACTAM 25 GRAM(S): 4; .5 INJECTION, POWDER, LYOPHILIZED, FOR SOLUTION INTRAVENOUS at 05:15

## 2024-01-13 RX ADMIN — GABAPENTIN 100 MILLIGRAM(S): 400 CAPSULE ORAL at 21:31

## 2024-01-13 RX ADMIN — OXYCODONE HYDROCHLORIDE 5 MILLIGRAM(S): 5 TABLET ORAL at 16:00

## 2024-01-13 RX ADMIN — Medication 3 MILLIGRAM(S): at 23:34

## 2024-01-13 RX ADMIN — MEMANTINE HYDROCHLORIDE 10 MILLIGRAM(S): 10 TABLET ORAL at 05:15

## 2024-01-13 RX ADMIN — GABAPENTIN 100 MILLIGRAM(S): 400 CAPSULE ORAL at 13:11

## 2024-01-13 RX ADMIN — PIPERACILLIN AND TAZOBACTAM 25 GRAM(S): 4; .5 INJECTION, POWDER, LYOPHILIZED, FOR SOLUTION INTRAVENOUS at 21:32

## 2024-01-13 NOTE — PROGRESS NOTE ADULT - ASSESSMENT
73 F with pmhx memory issues and osteoporosis presents ED complaining of worsening facial swelling and pain. Hypodense lesion with irregular ri of enhancement centered in the right aspect of the floor of mouth measuring approximately 3.3 x 2.4 x 2.6 cm. Clinical presentation c/w abscess v necrotic mass. Given normal WBC, febrile low concern for acute infection. Given pts clinical exam w/ firm floor of mouth, neck mass and weight loss concern for neoplastic process. Now s/p bedside incision and drainage of floor of mouth collection with 2x cultures, saline washout, and subperiosteal dissection along lingual aspect of anterior mandible (1/11/24). Pt progressing well in immediate post-operative period.     Plan:  - C/w IV abx  - Multimodal pain control  - OMFS to follow closely  - F/u cultures for s/r of abx  - Appreciate rest of care per primary team    Isela Vick  Oral and Maxillofacial Surgery  [facility] OMFS Pager #[phone]  Available on Teams 73 F with pmhx memory issues and osteoporosis presents ED complaining of worsening facial swelling and pain. Hypodense lesion with irregular ri of enhancement centered in the right aspect of the floor of mouth measuring approximately 3.3 x 2.4 x 2.6 cm. Clinical presentation c/w abscess v necrotic mass. Given normal WBC, febrile low concern for acute infection. Given pts clinical exam w/ firm floor of mouth, neck mass and weight loss concern for neoplastic process. Now s/p bedside incision and drainage of floor of mouth collection with 2x cultures, saline washout, and subperiosteal dissection along lingual aspect of anterior mandible (1/11/24). Pt progressing well in immediate post-operative period.     Plan:  - C/w IV abx  - Multimodal pain control  - OMFS to follow closely  - F/u cultures for s/r of abx  - Appreciate rest of care per primary team    Isela Vick  Oral and Maxillofacial Surgery  [facility] OMFS Pager #[phone]  Available on Teams 73 F with pmhx memory issues and osteoporosis presents ED complaining of worsening facial swelling and pain. Hypodense lesion with irregular ri of enhancement centered in the right aspect of the floor of mouth measuring approximately 3.3 x 2.4 x 2.6 cm. Clinical presentation c/w abscess v necrotic mass. Given normal WBC, febrile low concern for acute infection. Given pts clinical exam w/ firm floor of mouth, neck mass and weight loss concern for neoplastic process. Now s/p bedside incision and drainage of floor of mouth collection with 2x cultures, saline washout, and subperiosteal dissection along lingual aspect of anterior mandible (1/11/24). Pt progressing well in immediate post-operative period.     Plan:  - C/w IV abx (Zosyn)   - F/u cultures speciation and sensitivities to tailor Abx coverage   - Diet as tolerated   - Patient will require definitive tx with debridement of necrotic bone at mandible after resolution of acute infection - to be coordinated as outpatient.    - Appreciate rest of care per primary team    Melissa Smith  Oral Maxillofacial Surgery  [facility]: [id number]  [facility]: [phone]  [facility]: [phone]  Available on Microsoft Teams 73 F with pmhx memory issues and osteoporosis presents ED complaining of worsening facial swelling and pain. Hypodense lesion with irregular ri of enhancement centered in the right aspect of the floor of mouth measuring approximately 3.3 x 2.4 x 2.6 cm. Clinical presentation c/w abscess v necrotic mass. Given normal WBC, febrile low concern for acute infection. Given pts clinical exam w/ firm floor of mouth, neck mass and weight loss concern for neoplastic process. Now s/p bedside incision and drainage of floor of mouth collection with 2x cultures, saline washout, and subperiosteal dissection along lingual aspect of anterior mandible (1/11/24). Pt progressing well in immediate post-operative period.     Plan:  - C/w IV abx (Zosyn)   - F/u cultures speciation and sensitivities to tailor Abx coverage   - Diet as tolerated   - Patient will require definitive tx with debridement of necrotic bone at mandible after resolution of acute infection - to be coordinated as outpatient.    - Appreciate rest of care per primary team    Melissa Smith  Oral Maxillofacial Surgery  [facility]: [id number]  [facility]: [phone]  [facility]: [phone]  Available on Microsoft Teams

## 2024-01-13 NOTE — PROGRESS NOTE ADULT - SUBJECTIVE AND OBJECTIVE BOX
Andre Reyes, M.D.  Pager: [phone]  Office: [phone]    Patient is a 73y old  Female who presents with a chief complaint of facial swelling (13 Jan 2024 08:32)          SUBJECTIVE / OVERNIGHT EVENTS:    No acute overnight events.    ROS: ( - ) Fever, ( - )Chills,  ( - )Nausea/Vomiting, ( - ) Cough, ( - )Shortness of breath, ( - )Chest Pain    MEDICATIONS  (STANDING):  chlorhexidine 0.12% Liquid 15 milliLiter(s) Swish and Spit two times a day  dextrose 5% + sodium chloride 0.45%. 1000 milliLiter(s) (80 mL/Hr) IV Continuous <Continuous>  enoxaparin Injectable 40 milliGRAM(s) SubCutaneous every 24 hours  gabapentin 100 milliGRAM(s) Oral three times a day  ketorolac   Injectable 15 milliGRAM(s) IV Push every 6 hours  memantine 10 milliGRAM(s) Oral two times a day  naloxone Injectable 0.4 milliGRAM(s) IV Push once  pantoprazole    Tablet 40 milliGRAM(s) Oral before breakfast  piperacillin/tazobactam IVPB.. 3.375 Gram(s) IV Intermittent every 8 hours  polyethylene glycol 3350 17 Gram(s) Oral daily  potassium chloride    Tablet ER 40 milliEquivalent(s) Oral once  senna 2 Tablet(s) Oral at bedtime    MEDICATIONS  (PRN):  acetaminophen     Tablet .. 650 milliGRAM(s) Oral every 6 hours PRN Temp greater or equal to 38C (100.4F), Mild Pain (1 - 3)  aluminum hydroxide/magnesium hydroxide/simethicone Suspension 30 milliLiter(s) Oral every 4 hours PRN Dyspepsia  bisacodyl 5 milliGRAM(s) Oral daily PRN Constipation  melatonin 3 milliGRAM(s) Oral at bedtime PRN Insomnia  ondansetron Injectable 4 milliGRAM(s) IV Push every 8 hours PRN Nausea and/or Vomiting  oxyCODONE    IR 2.5 milliGRAM(s) Oral every 4 hours PRN Moderate Pain (4 - 6)  oxyCODONE    IR 5 milliGRAM(s) Oral every 4 hours PRN Severe Pain (7 - 10)          T(C): 36.5 (01-13 @ 05:06), Max: 37.1 (01-12 @ 13:18)   HR: 68   BP: 118/72   RR: 18   SpO2: 94%    PHYSICAL EXAM:    CONSTITUTIONAL: NAD, well-developed, well-groomed  EYES: PERRLA; conjunctiva and sclera clear  ENMT: Moist oral mucosa, no pharyngeal injection or exudates; normal dentition  NECK: Supple, no palpable masses; no thyromegaly  RESPIRATORY: Normal respiratory effort; lungs are clear to auscultation bilaterally  CARDIOVASCULAR: Regular rate and rhythm, normal S1 and S2, no murmur/rub/gallop; No lower extremity edema; Peripheral pulses are 2+ bilaterally  ABDOMEN: Nontender to palpation, normoactive bowel sounds, no rebound/guarding; No hepatosplenomegaly  MUSCULOSKELETAL:  no clubbing or cyanosis of digits; no joint swelling or tenderness to palpation  PSYCH: A+O to person, place, and time; affect appropriate  NEUROLOGY: CN 2-12 are intact and symmetric; no gross sensory deficits   SKIN: No rashes; no palpable lesions      LABS:                        12.7   6.70  )-----------( 178      ( 12 Jan 2024 07:24 )             40.1      01-12    134<L>  |  97  |  9   ----------------------------<  124<H>  3.1<L>   |  27  |  0.56    Ca    8.2<L>      12 Jan 2024 07:25         CAPILLARY BLOOD GLUCOSE          RADIOLOGY & ADDITIONAL TESTS:    Imaging Personally Reviewed:  Consultant(s) Notes Reviewed:    Care Discussed with Consultants/Other Providers:   Andre Reyes, M.D.  Pager: [phone]  Office: [phone]    Patient is a 73y old  Female who presents with a chief complaint of facial swelling (13 Jan 2024 08:32)          SUBJECTIVE / OVERNIGHT EVENTS:    No acute overnight events.    ROS: ( - ) Fever, ( - )Chills,  ( - )Nausea/Vomiting, ( - ) Cough, ( - )Shortness of breath, ( - )Chest Pain    MEDICATIONS  (STANDING):  chlorhexidine 0.12% Liquid 15 milliLiter(s) Swish and Spit two times a day  dextrose 5% + sodium chloride 0.45%. 1000 milliLiter(s) (80 mL/Hr) IV Continuous <Continuous>  enoxaparin Injectable 40 milliGRAM(s) SubCutaneous every 24 hours  gabapentin 100 milliGRAM(s) Oral three times a day  ketorolac   Injectable 15 milliGRAM(s) IV Push every 6 hours  memantine 10 milliGRAM(s) Oral two times a day  naloxone Injectable 0.4 milliGRAM(s) IV Push once  pantoprazole    Tablet 40 milliGRAM(s) Oral before breakfast  piperacillin/tazobactam IVPB.. 3.375 Gram(s) IV Intermittent every 8 hours  polyethylene glycol 3350 17 Gram(s) Oral daily  potassium chloride    Tablet ER 40 milliEquivalent(s) Oral once  senna 2 Tablet(s) Oral at bedtime    MEDICATIONS  (PRN):  acetaminophen     Tablet .. 650 milliGRAM(s) Oral every 6 hours PRN Temp greater or equal to 38C (100.4F), Mild Pain (1 - 3)  aluminum hydroxide/magnesium hydroxide/simethicone Suspension 30 milliLiter(s) Oral every 4 hours PRN Dyspepsia  bisacodyl 5 milliGRAM(s) Oral daily PRN Constipation  melatonin 3 milliGRAM(s) Oral at bedtime PRN Insomnia  ondansetron Injectable 4 milliGRAM(s) IV Push every 8 hours PRN Nausea and/or Vomiting  oxyCODONE    IR 2.5 milliGRAM(s) Oral every 4 hours PRN Moderate Pain (4 - 6)  oxyCODONE    IR 5 milliGRAM(s) Oral every 4 hours PRN Severe Pain (7 - 10)          T(C): 36.5 (01-13 @ 05:06), Max: 37.1 (01-12 @ 13:18)   HR: 68   BP: 118/72   RR: 18   SpO2: 94%    PHYSICAL EXAM:    CONSTITUTIONAL: NAD, well-developed, well-groomed  EYES: PERRLA; conjunctiva and sclera clear  ENMT: Moist oral mucosa, no pharyngeal injection or exudates; normal dentition  NECK: Supple, no palpable masses; no thyromegaly  RESPIRATORY: Normal respiratory effort; lungs are clear to auscultation bilaterally  CARDIOVASCULAR: Regular rate and rhythm, normal S1 and S2, no murmur/rub/gallop; No lower extremity edema; Peripheral pulses are 2+ bilaterally  ABDOMEN: Nontender to palpation, normoactive bowel sounds, no rebound/guarding; No hepatosplenomegaly  MUSCULOSKELETAL:  no clubbing or cyanosis of digits; no joint swelling or tenderness to palpation  PSYCH: A+O to person, place, and time; affect appropriate  NEUROLOGY: CN 2-12 are intact and symmetric; no gross sensory deficits   SKIN: No rashes; no palpable lesions      LABS:                        12.7   6.70  )-----------( 178      ( 12 Jan 2024 07:24 )             40.1      01-12    134<L>  |  97  |  9   ----------------------------<  124<H>  3.1<L>   |  27  |  0.56    Ca    8.2<L>      12 Jan 2024 07:25         CAPILLARY BLOOD GLUCOSE          RADIOLOGY & ADDITIONAL TESTS:    Imaging Personally Reviewed:  Consultant(s) Notes Reviewed:    Care Discussed with Consultants/Other Providers:   Andre Reyes, M.D.  Pager: [phone]  Office: [phone]    Patient is a 73y old  Female who presents with a chief complaint of facial swelling (13 Jan 2024 08:32)          SUBJECTIVE / OVERNIGHT EVENTS:    No acute overnight events.  Still having mouth pain although she said it has improved.    ROS: ( - ) Fever, ( - )Chills,  ( - )Nausea/Vomiting, ( - ) Cough, ( - )Shortness of breath, ( - )Chest Pain    MEDICATIONS  (STANDING):  chlorhexidine 0.12% Liquid 15 milliLiter(s) Swish and Spit two times a day  dextrose 5% + sodium chloride 0.45%. 1000 milliLiter(s) (80 mL/Hr) IV Continuous <Continuous>  enoxaparin Injectable 40 milliGRAM(s) SubCutaneous every 24 hours  gabapentin 100 milliGRAM(s) Oral three times a day  ketorolac   Injectable 15 milliGRAM(s) IV Push every 6 hours  memantine 10 milliGRAM(s) Oral two times a day  naloxone Injectable 0.4 milliGRAM(s) IV Push once  pantoprazole    Tablet 40 milliGRAM(s) Oral before breakfast  piperacillin/tazobactam IVPB.. 3.375 Gram(s) IV Intermittent every 8 hours  polyethylene glycol 3350 17 Gram(s) Oral daily  potassium chloride    Tablet ER 40 milliEquivalent(s) Oral once  senna 2 Tablet(s) Oral at bedtime    MEDICATIONS  (PRN):  acetaminophen     Tablet .. 650 milliGRAM(s) Oral every 6 hours PRN Temp greater or equal to 38C (100.4F), Mild Pain (1 - 3)  aluminum hydroxide/magnesium hydroxide/simethicone Suspension 30 milliLiter(s) Oral every 4 hours PRN Dyspepsia  bisacodyl 5 milliGRAM(s) Oral daily PRN Constipation  melatonin 3 milliGRAM(s) Oral at bedtime PRN Insomnia  ondansetron Injectable 4 milliGRAM(s) IV Push every 8 hours PRN Nausea and/or Vomiting  oxyCODONE    IR 2.5 milliGRAM(s) Oral every 4 hours PRN Moderate Pain (4 - 6)  oxyCODONE    IR 5 milliGRAM(s) Oral every 4 hours PRN Severe Pain (7 - 10)          T(C): 36.5 (01-13 @ 05:06), Max: 37.1 (01-12 @ 13:18)   HR: 68   BP: 118/72   RR: 18   SpO2: 94%    PHYSICAL EXAM:    CONSTITUTIONAL: NAD, well-developed, well-groomed  EYES: PERRLA; conjunctiva and sclera clear  ENMT: tongue swelling improved. submandibular swelling improved.  NECK: Supple, no palpable masses; no thyromegaly  RESPIRATORY: Normal respiratory effort; lungs are clear to auscultation bilaterally  CARDIOVASCULAR: Regular rate and rhythm, normal S1 and S2, no murmur/rub/gallop; No lower extremity edema; Peripheral pulses are 2+ bilaterally  ABDOMEN: Nontender to palpation, normoactive bowel sounds, no rebound/guarding; No hepatosplenomegaly  MUSCULOSKELETAL:  no clubbing or cyanosis of digits; no joint swelling or tenderness to palpation  PSYCH: A+O to person, place, and time; affect appropriate  NEUROLOGY: CN 2-12 are intact and symmetric; no gross sensory deficits   SKIN: No rashes; no palpable lesions      LABS:                        12.7   6.70  )-----------( 178      ( 12 Jan 2024 07:24 )             40.1      01-12    134<L>  |  97  |  9   ----------------------------<  124<H>  3.1<L>   |  27  |  0.56    Ca    8.2<L>      12 Jan 2024 07:25         CAPILLARY BLOOD GLUCOSE          RADIOLOGY & ADDITIONAL TESTS:    Imaging Personally Reviewed:  Consultant(s) Notes Reviewed:    Care Discussed with Consultants/Other Providers:   Andre Reyes, M.D.  Pager: [phone]  Office: [phone]    Patient is a 73y old  Female who presents with a chief complaint of facial swelling (13 Jan 2024 08:32)          SUBJECTIVE / OVERNIGHT EVENTS:    No acute overnight events.  Still having mouth pain although she said it has improved.    ROS: ( - ) Fever, ( - )Chills,  ( - )Nausea/Vomiting, ( - ) Cough, ( - )Shortness of breath, ( - )Chest Pain    MEDICATIONS  (STANDING):  chlorhexidine 0.12% Liquid 15 milliLiter(s) Swish and Spit two times a day  dextrose 5% + sodium chloride 0.45%. 1000 milliLiter(s) (80 mL/Hr) IV Continuous <Continuous>  enoxaparin Injectable 40 milliGRAM(s) SubCutaneous every 24 hours  gabapentin 100 milliGRAM(s) Oral three times a day  ketorolac   Injectable 15 milliGRAM(s) IV Push every 6 hours  memantine 10 milliGRAM(s) Oral two times a day  naloxone Injectable 0.4 milliGRAM(s) IV Push once  pantoprazole    Tablet 40 milliGRAM(s) Oral before breakfast  piperacillin/tazobactam IVPB.. 3.375 Gram(s) IV Intermittent every 8 hours  polyethylene glycol 3350 17 Gram(s) Oral daily  potassium chloride    Tablet ER 40 milliEquivalent(s) Oral once  senna 2 Tablet(s) Oral at bedtime    MEDICATIONS  (PRN):  acetaminophen     Tablet .. 650 milliGRAM(s) Oral every 6 hours PRN Temp greater or equal to 38C (100.4F), Mild Pain (1 - 3)  aluminum hydroxide/magnesium hydroxide/simethicone Suspension 30 milliLiter(s) Oral every 4 hours PRN Dyspepsia  bisacodyl 5 milliGRAM(s) Oral daily PRN Constipation  melatonin 3 milliGRAM(s) Oral at bedtime PRN Insomnia  ondansetron Injectable 4 milliGRAM(s) IV Push every 8 hours PRN Nausea and/or Vomiting  oxyCODONE    IR 2.5 milliGRAM(s) Oral every 4 hours PRN Moderate Pain (4 - 6)  oxyCODONE    IR 5 milliGRAM(s) Oral every 4 hours PRN Severe Pain (7 - 10)          T(C): 36.5 (01-13 @ 05:06), Max: 37.1 (01-12 @ 13:18)   HR: 68   BP: 118/72   RR: 18   SpO2: 94%    PHYSICAL EXAM:    CONSTITUTIONAL: NAD, well-developed, well-groomed  EYES: PERRLA; conjunctiva and sclera clear  ENMT: tongue swelling improved. submandibular swelling improved.  NECK: Supple, no palpable masses; no thyromegaly  RESPIRATORY: Normal respiratory effort; lungs are clear to auscultation bilaterally  CARDIOVASCULAR: Regular rate and rhythm, normal S1 and S2, no murmur/rub/gallop; No lower extremity edema; Peripheral pulses are 2+ bilaterally  ABDOMEN: Nontender to palpation, normoactive bowel sounds, no rebound/guarding; No hepatosplenomegaly  MUSCULOSKELETAL:  no clubbing or cyanosis of digits; no joint swelling or tenderness to palpation  PSYCH: A+O to person, place, and time; affect appropriate  NEUROLOGY: CN 2-12 are intact and symmetric; no gross sensory deficits   SKIN: No rashes; no palpable lesions      LABS:                        12.7   6.70  )-----------( 178      ( 12 Jan 2024 07:24 )             40.1      01-12    134<L>  |  97  |  9   ----------------------------<  124<H>  3.1<L>   |  27  |  0.56    Ca    8.2<L>      12 Jan 2024 07:25         CAPILLARY BLOOD GLUCOSE          RADIOLOGY & ADDITIONAL TESTS:    Imaging Personally Reviewed:  Consultant(s) Notes Reviewed:    Care Discussed with Consultants/Other Providers:

## 2024-01-13 NOTE — PROGRESS NOTE ADULT - SUBJECTIVE AND OBJECTIVE BOX
[facility]  [address], [address], [facility], [address]  Phone: [phone]  Fax: [phone]   Nutrition Assessment  Medical Nutrition Therapy   Outpatient Initial Evaluation         Patient Name: Harsha Garner : 1975   Treatment Diagnosis: Hypercholesterolemia, overweight Onset Date:    Referral Source: Nayeli Burgos, [address] [place] [address]* Start of Care [facility]): 2017     Ht:  67 in  cm Wt: 171.4 lb  kg   BMI: 26.8  BMR   male  BMR female 1480     PMHx includes: Supraventricular tachycardia, depression/anxiety, asthma     Medications of Nutritional Significance:   Metoprolol, zoloft, ativan     Subjective/Assessment:   42YO female referred to RD for hypercholesterolemia and weight gain. Most recent lipid profile (16) shows total cholesterol and LDL were elevated (210 and 123.8, respectively). Trig and HDL at goal. Per BMI, pt is considered overweight. Pt lives with her  and 2 teenage children. She states weight has not always been a problem until more recent years. She is fairly active by working out approximately one hour, 3-4 times per week after work, which includes going to [employer] (to do aerobic and strength training exercises), a spin class and/or a pure barre class. Pt expressed comprehension of education and motivation to make dietary changes; compliance is expected. Current Eating Patterns: Per diet recall, pt does well by not skipping meals, but some meals seem to lack balance and seem to not be sufficient enough to last her 4-5 hours until her next meal. Therefore, she becomes hungry shortly thereafter (especially b-fast) and snacks on whatever is available. Snacks may include nuts with cheese & dried fruit, cookies, cake, brownies, and/or donuts. She will snack approximately 3-4 times between lunch and dinner, but not always because she is hungry (cravings).  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 Dinner meal at home is fairly balanced, but usually higher in carbs; plus pt will occasionally snack after dinner (sweet or salty snack). Pt mostly tries to drink calorie-free beverages (water or sparkling water), but will enjoy juice (OJ or lemonade) or soda occasionally, as well as red wine. Overall, calories are likely excessive on a regular basis due to calorically dense snacks and pt is not always eating when hungry, but eating due to cravings. Handouts/  Information Provided: [x]  Carbohydrates  [x]  Protein  []  Fiber  [x]  Serving Sizes  []  Meal and Snack Ideas  []  Food Journals []  Diabetes  []  Cholesterol  []  Sodium  [x]  Gen Nutr Guidelines  []  SBGM Guidelines  [x]  Others: List of healthy snack ideas and recipe    Discussed the Healthy Plate Method and appropriate portion sizes of different food groups at meals. Explained the importance of combining carbohydrates with protein at meals and reviewed foods that contain each nutrient.  Discussed the importance of balanced, adequate meals to avoid being hungry frequently throughout the day and to include a snack if unable to eat next meal within 5 hours of the last meal.   Estimate Needs:   Calories:  1850 Protein: 116 Carbs: 208 Fat: 62   Kcal/day  g/day  g/day  g/day        percent: 25  45  30     Nutritional Goal - To promote lifestyle changes to result in:    [x]  Weight loss  []  Improved diabetic control  [x]  Decreased cholesterol levels  []  Decreased blood pressure  []  Weight maintenance []  Preventing any interactions associated with food allergies  []  Adequate weight gain toward goal weight  []  Other:        Recommendations: - Pt will combine carbohydrates with a protein source at every meal and snack.  - Pt will eat within 2 hours of waking and eat a meal every 4-5 hours while HPI: 73 F with pmhx memory issues and osteoporosis presents ED complaining of worsening facial swelling and pain.  Patient reports that in September 2023 she had a tooth extracted and then developed MRONJ  thought to be related to Prolia injections.  She reports that she has been following with both OMFS Dr. Etienne Cooper who performed a washout procedure in November 2023 and with ENT Dr. Urbano Andujar as well as her PCP.  She reports that over the last month has had increased swelling to the lower jaw area with increased pain which acutely progressed 4 days ago. She was prescribed Biaxin which she has been taking for approximately 1 week without much relief. Saw ENT yesterday who recommended patient come to the emergency department for IV antibiotics. She has not seen her oral surgeon since procedure.  CT+  hypodense lesion with irregular rind of enhancement centered in the right aspect of the floor of mouth measuring approximately 3.3 x 2.4 x 2.6 cm. Patient reports that she has trismus, dysphagia, odynophagia and difficulty eating secondary to swelling and has lost several pounds.  She denies fevers, chills, difficulty breathing, chest pain, shortness of breath abdominal pain nausea or vomiting. Pt on Zosyn in ED. Now s/p bedside incision and drainage of floor of mouth collection with 2x cultures, saline washout, and subperiosteal dissection along lingual aspect of anterior mandible.     Interval Events: Patient examined bedside resting comfortably, sublingual area firm, hemostatic, non-fluctuant, and no purulence noticed. Pt endorses pain being controlled and does not exhibit trismus. Denies difficulty breathing.    ICU Vital Signs Last 24 Hrs  T(C): 36.5 (13 Jan 2024 05:06), Max: 37.1 (12 Jan 2024 13:18)  T(F): 97.7 (13 Jan 2024 05:06), Max: 98.7 (12 Jan 2024 13:18)  HR: 68 (13 Jan 2024 05:06) (68 - 99)  BP: 118/72 (13 Jan 2024 05:06) (98/63 - 126/75)  BP(mean): --  ABP: --  ABP(mean): --  RR: 18 (13 Jan 2024 05:06) (18 - 18)  SpO2: 94% (13 Jan 2024 05:06) (94% - 98%)    O2 Parameters below as of 13 Jan 2024 05:06  Patient On (Oxygen Delivery Method): room air        MEDICATIONS  (STANDING):  chlorhexidine 0.12% Liquid 15 milliLiter(s) Swish and Spit two times a day  dextrose 5% + sodium chloride 0.45%. 1000 milliLiter(s) (80 mL/Hr) IV Continuous <Continuous>  enoxaparin Injectable 40 milliGRAM(s) SubCutaneous every 24 hours  gabapentin 100 milliGRAM(s) Oral three times a day  ketorolac   Injectable 15 milliGRAM(s) IV Push every 6 hours  memantine 10 milliGRAM(s) Oral two times a day  naloxone Injectable 0.4 milliGRAM(s) IV Push once  pantoprazole    Tablet 40 milliGRAM(s) Oral before breakfast  piperacillin/tazobactam IVPB.. 3.375 Gram(s) IV Intermittent every 8 hours  polyethylene glycol 3350 17 Gram(s) Oral daily  senna 2 Tablet(s) Oral at bedtime    MEDICATIONS  (PRN):  acetaminophen     Tablet .. 650 milliGRAM(s) Oral every 6 hours PRN Temp greater or equal to 38C (100.4F), Mild Pain (1 - 3)  aluminum hydroxide/magnesium hydroxide/simethicone Suspension 30 milliLiter(s) Oral every 4 hours PRN Dyspepsia  bisacodyl 5 milliGRAM(s) Oral daily PRN Constipation  melatonin 3 milliGRAM(s) Oral at bedtime PRN Insomnia  ondansetron Injectable 4 milliGRAM(s) IV Push every 8 hours PRN Nausea and/or Vomiting  oxyCODONE    IR 2.5 milliGRAM(s) Oral every 4 hours PRN Moderate Pain (4 - 6)  oxyCODONE    IR 5 milliGRAM(s) Oral every 4 hours PRN Severe Pain (7 - 10)      LABS:                          12.7   6.70  )-----------( 178      ( 12 Jan 2024 07:24 )             40.1     01-12    134<L>  |  97  |  9   ----------------------------<  124<H>  3.1<L>   |  27  |  0.56    Ca    8.2<L>      12 Jan 2024 07:25          Urinalysis Basic - ( 12 Jan 2024 07:25 )    Color: x / Appearance: x / SG: x / pH: x  Gluc: 124 mg/dL / Ketone: x  / Bili: x / Urobili: x   Blood: x / Protein: x / Nitrite: x   Leuk Esterase: x / RBC: x / WBC x   Sq Epi: x / Non Sq Epi: x / Bacteria: x        Physical Exam  Gen: AAOx3, NAD   Head: firmness and tenderness to palpation submental region.  Eyes: EOMI, PERRL, visual acuity intact, no diplopia  Ears: Gross hearing intact,  no otorrhea  Nose: No asymmetry, no rhinorrhea  Malar: No CN V-2 paresthesia   Throat: No LAD, supple, trachea midline  Extraoral/Intraoral Exam: CHRISTOPH: >40mm, dentition grossly intact, no CN V-3 paresthesia, floor of mouth firm and raised, tenderness of FOM which is likely 2/2 post-operative period, Inferior border of the mandible is palpable bilaterally, no exposed bone noted in site of previous MRONJ  awake. If longer than 5 hours between meals, pt will have a snack. Avoid eating within 2-3 hours of going to bed. - Pt will limit sweets to only one serving per day during the week.      Information Reviewed with: pt   Potential Barriers to Learning: none     Dietitian Signature: Hali Kim RD Date: 1/6/2017   Follow-up: Fri, 1/20/17 @12pm Time: 3:23 PM HPI: 73 F with pmhx memory issues and osteoporosis presents ED complaining of worsening facial swelling and pain.  Patient reports that in September 2023 she had a tooth extracted and then developed MRONJ  thought to be related to Prolia injections.  She reports that she has been following with both OMFS Dr. Etienne Cooper who performed a washout procedure in November 2023 and with ENT Dr. Urbano Andujar as well as her PCP.  She reports that over the last month has had increased swelling to the lower jaw area with increased pain which acutely progressed 4 days ago. She was prescribed Biaxin which she has been taking for approximately 1 week without much relief. Saw ENT yesterday who recommended patient come to the emergency department for IV antibiotics. She has not seen her oral surgeon since procedure.  CT+  hypodense lesion with irregular rind of enhancement centered in the right aspect of the floor of mouth measuring approximately 3.3 x 2.4 x 2.6 cm. Patient reports that she has trismus, dysphagia, odynophagia and difficulty eating secondary to swelling and has lost several pounds.  She denies fevers, chills, difficulty breathing, chest pain, shortness of breath abdominal pain nausea or vomiting. Pt on Zosyn in ED. Now s/p bedside incision and drainage of floor of mouth collection with 2x cultures, saline washout, and subperiosteal dissection along lingual aspect of anterior mandible on 1/11.     Interval Events: NAEON, VSS/AF, improvement in submental edema, soft now, FOM soft, tongue with restricted ROM likely secondary edema from drainage, no latasha purulence on exam. Tolerating secretions, speaking w/o difficulty. WBC: 6.7      Focused Maxillofacial Physical Exam: AAOx3, NAD, resting comfortably, speaking w/o difficulty - improvement in submental edema, soft now, FOM soft, tongue with restricted ROM likely secondary edema from drainage, no latasha purulence on exam. Tolerating secretions,    Vital Signs Last 24 Hrs  T(C): 36.3 (13 Jan 2024 09:38), Max: 37.1 (12 Jan 2024 13:18)  T(F): 97.4 (13 Jan 2024 09:38), Max: 98.7 (12 Jan 2024 13:18)  HR: 70 (13 Jan 2024 09:38) (68 - 99)  BP: 106/63 (13 Jan 2024 09:38) (98/63 - 126/75)  BP(mean): --  RR: 18 (13 Jan 2024 09:38) (18 - 18)  SpO2: 99% (13 Jan 2024 09:38) (94% - 99%)    Parameters below as of 13 Jan 2024 09:38  Patient On (Oxygen Delivery Method): room air    Labs                         12.7   6.70  )-----------( 178      ( 12 Jan 2024 07:24 )             40.1       01-12    134<L>  |  97  |  9   ----------------------------<  124<H>  3.1<L>   |  27  |  0.56    Ca    8.2<L>      12 Jan 2024 07:25  Phos  1.9     01-12  Mg     2.0     01-12

## 2024-01-13 NOTE — PROGRESS NOTE ADULT - SUBJECTIVE AND OBJECTIVE BOX
C A R D I O L O G Y  **********************************     DATE OF SERVICE: 01-13-24    pt resting in bed, offers no complaints this morniing       acetaminophen     Tablet .. 650 milliGRAM(s) Oral every 6 hours PRN  aluminum hydroxide/magnesium hydroxide/simethicone Suspension 30 milliLiter(s) Oral every 4 hours PRN  bisacodyl 5 milliGRAM(s) Oral daily PRN  chlorhexidine 0.12% Liquid 15 milliLiter(s) Swish and Spit two times a day  dextrose 5% + sodium chloride 0.45%. 1000 milliLiter(s) IV Continuous <Continuous>  enoxaparin Injectable 40 milliGRAM(s) SubCutaneous every 24 hours  gabapentin 100 milliGRAM(s) Oral three times a day  ketorolac   Injectable 15 milliGRAM(s) IV Push every 6 hours  melatonin 3 milliGRAM(s) Oral at bedtime PRN  memantine 10 milliGRAM(s) Oral two times a day  naloxone Injectable 0.4 milliGRAM(s) IV Push once  ondansetron Injectable 4 milliGRAM(s) IV Push every 8 hours PRN  oxyCODONE    IR 2.5 milliGRAM(s) Oral every 4 hours PRN  oxyCODONE    IR 5 milliGRAM(s) Oral every 4 hours PRN  pantoprazole    Tablet 40 milliGRAM(s) Oral before breakfast  piperacillin/tazobactam IVPB.. 3.375 Gram(s) IV Intermittent every 8 hours  polyethylene glycol 3350 17 Gram(s) Oral daily  senna 2 Tablet(s) Oral at bedtime                            12.7   6.70  )-----------( 178      ( 12 Jan 2024 07:24 )             40.1       Hemoglobin: 12.7 g/dL (01-12 @ 07:24)  Hemoglobin: 11.6 g/dL (01-11 @ 05:55)  Hemoglobin: 11.7 g/dL (01-10 @ 16:14)      01-12    134<L>  |  97  |  9   ----------------------------<  124<H>  3.1<L>   |  27  |  0.56    Ca    8.2<L>      12 Jan 2024 07:25      Creatinine Trend: 0.56<--, 0.65<--, 0.75<--    COAGS:           T(C): 36.5 (01-13-24 @ 05:06), Max: 37.1 (01-12-24 @ 13:18)  HR: 68 (01-13-24 @ 05:06) (68 - 100)  BP: 118/72 (01-13-24 @ 05:06) (98/63 - 126/75)  RR: 18 (01-13-24 @ 05:06) (18 - 18)  SpO2: 94% (01-13-24 @ 05:06) (94% - 98%)  Wt(kg): --    I&O's Summary    12 Jan 2024 07:01  -  13 Jan 2024 07:00  --------------------------------------------------------  IN: 2250 mL / OUT: 0 mL / NET: 2250 mL        Gen: NAD  HEENT:  (-)icterus (-)pallor  CV: N S1 S2 1/6 MANUEL (+)2 Pulses B/l  Resp:  Clear to auscultation B/L, normal effort  GI: (+) BS Soft, NT, ND  Lymph:  (-)Edema, (-)obvious lymphadenopathy  Skin: Warm to touch, Normal turgor  Psych: Appropriate mood and affect      TELEMETRY: None	      ASSESSMENT/PLAN: Patient is a 72 y/o female well known to our office with PMH of mci/dementia (alzheimer), anx/dep/insomnia, gerd, osteoporosis, fibromyalgia, ibs-c c/b diverticulosis + hemorrhoids who is admitted with facial swelling found to have necrotic mass in the right anterior floor the mouth.    #Necrotic Mass in mouth  - Workup per ENT/IR/OMFS - s/p I&D  - Based on patient's RCRI score of 0, would consider her low cardiac risk for any planned procedures/biopsies. Patient is optimized from CV to proceed. No evidence of clinical HF or unstable cardiac syndromes. She had office echo with normal LV/RV function in 1/2023 and normal exercise NST with no ischemia in 7/2021.    #Palpitations  - EKG with SR and PACs  - No arrhythmia on prior office holter monitor. Recommend repeat outpatient holter monitor    - No further inpatient cardiac w/u planned  - Patient to f/u with Dr. Duffy on 3/12 at 3:30 PM      C A R D I O L O G Y  **********************************     DATE OF SERVICE: 01-13-24    pt resting in bed, offers no complaints this morniing       acetaminophen     Tablet .. 650 milliGRAM(s) Oral every 6 hours PRN  aluminum hydroxide/magnesium hydroxide/simethicone Suspension 30 milliLiter(s) Oral every 4 hours PRN  bisacodyl 5 milliGRAM(s) Oral daily PRN  chlorhexidine 0.12% Liquid 15 milliLiter(s) Swish and Spit two times a day  dextrose 5% + sodium chloride 0.45%. 1000 milliLiter(s) IV Continuous <Continuous>  enoxaparin Injectable 40 milliGRAM(s) SubCutaneous every 24 hours  gabapentin 100 milliGRAM(s) Oral three times a day  ketorolac   Injectable 15 milliGRAM(s) IV Push every 6 hours  melatonin 3 milliGRAM(s) Oral at bedtime PRN  memantine 10 milliGRAM(s) Oral two times a day  naloxone Injectable 0.4 milliGRAM(s) IV Push once  ondansetron Injectable 4 milliGRAM(s) IV Push every 8 hours PRN  oxyCODONE    IR 2.5 milliGRAM(s) Oral every 4 hours PRN  oxyCODONE    IR 5 milliGRAM(s) Oral every 4 hours PRN  pantoprazole    Tablet 40 milliGRAM(s) Oral before breakfast  piperacillin/tazobactam IVPB.. 3.375 Gram(s) IV Intermittent every 8 hours  polyethylene glycol 3350 17 Gram(s) Oral daily  senna 2 Tablet(s) Oral at bedtime                            12.7   6.70  )-----------( 178      ( 12 Jan 2024 07:24 )             40.1       Hemoglobin: 12.7 g/dL (01-12 @ 07:24)  Hemoglobin: 11.6 g/dL (01-11 @ 05:55)  Hemoglobin: 11.7 g/dL (01-10 @ 16:14)      01-12    134<L>  |  97  |  9   ----------------------------<  124<H>  3.1<L>   |  27  |  0.56    Ca    8.2<L>      12 Jan 2024 07:25      Creatinine Trend: 0.56<--, 0.65<--, 0.75<--    COAGS:           T(C): 36.5 (01-13-24 @ 05:06), Max: 37.1 (01-12-24 @ 13:18)  HR: 68 (01-13-24 @ 05:06) (68 - 100)  BP: 118/72 (01-13-24 @ 05:06) (98/63 - 126/75)  RR: 18 (01-13-24 @ 05:06) (18 - 18)  SpO2: 94% (01-13-24 @ 05:06) (94% - 98%)  Wt(kg): --    I&O's Summary    12 Jan 2024 07:01  -  13 Jan 2024 07:00  --------------------------------------------------------  IN: 2250 mL / OUT: 0 mL / NET: 2250 mL        Gen: NAD  HEENT:  (-)icterus (-)pallor  CV: N S1 S2 1/6 MANUEL (+)2 Pulses B/l  Resp:  Clear to auscultation B/L, normal effort  GI: (+) BS Soft, NT, ND  Lymph:  (-)Edema, (-)obvious lymphadenopathy  Skin: Warm to touch, Normal turgor  Psych: Appropriate mood and affect      TELEMETRY: None	      ASSESSMENT/PLAN: Patient is a 74 y/o female well known to our office with PMH of mci/dementia (alzheimer), anx/dep/insomnia, gerd, osteoporosis, fibromyalgia, ibs-c c/b diverticulosis + hemorrhoids who is admitted with facial swelling found to have necrotic mass in the right anterior floor the mouth.    #Necrotic Mass in mouth  - Workup per ENT/IR/OMFS - s/p I&D  - Based on patient's RCRI score of 0, would consider her low cardiac risk for any planned procedures/biopsies. Patient is optimized from CV to proceed. No evidence of clinical HF or unstable cardiac syndromes. She had office echo with normal LV/RV function in 1/2023 and normal exercise NST with no ischemia in 7/2021.    #Palpitations  - EKG with SR and PACs  - No arrhythmia on prior office holter monitor. Recommend repeat outpatient holter monitor    - No further inpatient cardiac w/u planned  - Patient to f/u with Dr. Duffy on 3/12 at 3:30 PM

## 2024-01-13 NOTE — PROGRESS NOTE ADULT - PROBLEM SELECTOR PLAN 3
h/o mci/dementia (alzheimer), anx/dep/insomnia  outpatient neuro and int med documentation reviewed  patient with progressively worsening decline in short term memory  cont home memantine (was being planned for starting rivastigmine and dc'ing memantine)  neuro follow up upon discharge  Thyroid Stimulating Hormone, Serum: 1.71 uIU/mL (01.11.24 @ 05:55)  Vitamin B12, Serum: >2000 pg/mL (01.12.24 @ 07:24)  Folate, Serum: 5.5 ng/mL (01.12.24 @ 07:24)  RPR pending h/o mci/dementia (alzheimer), anx/dep/insomnia  outpatient neuro and int med documentation reviewed  patient with progressively worsening decline in short term memory  cont home memantine (was being planned for starting rivastigmine and dc'ing memantine)  neuro follow up upon discharge  Thyroid Stimulating Hormone, Serum: 1.71 uIU/mL (01.11.24 @ 05:55)  Vitamin B12, Serum: >2000 pg/mL (01.12.24 @ 07:24)  Folate, Serum: 5.5 ng/mL (01.12.24 @ 07:24)  RPR negative

## 2024-01-13 NOTE — PROGRESS NOTE ADULT - PROBLEM SELECTOR PLAN 1
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, but superimposed infection and abscess formation is not excluded."  s/p I&D - subperosteal dissection along lingual aspect of anterior mandible, washed out with saline  Pain control- standing toradol, oxycodone PRN  Peridex mouth wash BID  will f/u cultures   puree diet for now, advance to regular as tolerated 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, but superimposed infection and abscess formation is not excluded."  s/p I&D - subperosteal dissection along lingual aspect of anterior mandible, washed out with saline  Pain control- standing toradol, oxycodone PRN  Peridex mouth wash BID  will f/u cultures   will advance to a regular diet

## 2024-01-14 ENCOUNTER — TRANSCRIPTION ENCOUNTER (OUTPATIENT)
Age: 74
End: 2024-01-14

## 2024-01-14 LAB
ANION GAP SERPL CALC-SCNC: 10 MMOL/L — SIGNIFICANT CHANGE UP (ref 5–17)
ANION GAP SERPL CALC-SCNC: 10 MMOL/L — SIGNIFICANT CHANGE UP (ref 5–17)
BUN SERPL-MCNC: 6 MG/DL — LOW (ref 7–23)
BUN SERPL-MCNC: 6 MG/DL — LOW (ref 7–23)
CALCIUM SERPL-MCNC: 9 MG/DL — SIGNIFICANT CHANGE UP (ref 8.4–10.5)
CALCIUM SERPL-MCNC: 9 MG/DL — SIGNIFICANT CHANGE UP (ref 8.4–10.5)
CHLORIDE SERPL-SCNC: 103 MMOL/L — SIGNIFICANT CHANGE UP (ref 96–108)
CHLORIDE SERPL-SCNC: 103 MMOL/L — SIGNIFICANT CHANGE UP (ref 96–108)
CO2 SERPL-SCNC: 26 MMOL/L — SIGNIFICANT CHANGE UP (ref 22–31)
CO2 SERPL-SCNC: 26 MMOL/L — SIGNIFICANT CHANGE UP (ref 22–31)
CREAT SERPL-MCNC: 0.62 MG/DL — SIGNIFICANT CHANGE UP (ref 0.5–1.3)
CREAT SERPL-MCNC: 0.62 MG/DL — SIGNIFICANT CHANGE UP (ref 0.5–1.3)
EGFR: 95 ML/MIN/1.73M2 — SIGNIFICANT CHANGE UP
EGFR: 95 ML/MIN/1.73M2 — SIGNIFICANT CHANGE UP
GLUCOSE SERPL-MCNC: 85 MG/DL — SIGNIFICANT CHANGE UP (ref 70–99)
GLUCOSE SERPL-MCNC: 85 MG/DL — SIGNIFICANT CHANGE UP (ref 70–99)
HCT VFR BLD CALC: 36.7 % — SIGNIFICANT CHANGE UP (ref 34.5–45)
HCT VFR BLD CALC: 36.7 % — SIGNIFICANT CHANGE UP (ref 34.5–45)
HGB BLD-MCNC: 11.8 G/DL — SIGNIFICANT CHANGE UP (ref 11.5–15.5)
HGB BLD-MCNC: 11.8 G/DL — SIGNIFICANT CHANGE UP (ref 11.5–15.5)
MAGNESIUM SERPL-MCNC: 1.6 MG/DL — SIGNIFICANT CHANGE UP (ref 1.6–2.6)
MAGNESIUM SERPL-MCNC: 1.6 MG/DL — SIGNIFICANT CHANGE UP (ref 1.6–2.6)
MCHC RBC-ENTMCNC: 27.2 PG — SIGNIFICANT CHANGE UP (ref 27–34)
MCHC RBC-ENTMCNC: 27.2 PG — SIGNIFICANT CHANGE UP (ref 27–34)
MCHC RBC-ENTMCNC: 32.2 GM/DL — SIGNIFICANT CHANGE UP (ref 32–36)
MCHC RBC-ENTMCNC: 32.2 GM/DL — SIGNIFICANT CHANGE UP (ref 32–36)
MCV RBC AUTO: 84.6 FL — SIGNIFICANT CHANGE UP (ref 80–100)
MCV RBC AUTO: 84.6 FL — SIGNIFICANT CHANGE UP (ref 80–100)
NRBC # BLD: 0 /100 WBCS — SIGNIFICANT CHANGE UP (ref 0–0)
NRBC # BLD: 0 /100 WBCS — SIGNIFICANT CHANGE UP (ref 0–0)
PHOSPHATE SERPL-MCNC: 4.1 MG/DL — SIGNIFICANT CHANGE UP (ref 2.5–4.5)
PHOSPHATE SERPL-MCNC: 4.1 MG/DL — SIGNIFICANT CHANGE UP (ref 2.5–4.5)
PLATELET # BLD AUTO: 168 K/UL — SIGNIFICANT CHANGE UP (ref 150–400)
PLATELET # BLD AUTO: 168 K/UL — SIGNIFICANT CHANGE UP (ref 150–400)
POTASSIUM SERPL-MCNC: 4.4 MMOL/L — SIGNIFICANT CHANGE UP (ref 3.5–5.3)
POTASSIUM SERPL-MCNC: 4.4 MMOL/L — SIGNIFICANT CHANGE UP (ref 3.5–5.3)
POTASSIUM SERPL-SCNC: 4.4 MMOL/L — SIGNIFICANT CHANGE UP (ref 3.5–5.3)
POTASSIUM SERPL-SCNC: 4.4 MMOL/L — SIGNIFICANT CHANGE UP (ref 3.5–5.3)
RBC # BLD: 4.34 M/UL — SIGNIFICANT CHANGE UP (ref 3.8–5.2)
RBC # BLD: 4.34 M/UL — SIGNIFICANT CHANGE UP (ref 3.8–5.2)
RBC # FLD: 16.5 % — HIGH (ref 10.3–14.5)
RBC # FLD: 16.5 % — HIGH (ref 10.3–14.5)
SODIUM SERPL-SCNC: 139 MMOL/L — SIGNIFICANT CHANGE UP (ref 135–145)
SODIUM SERPL-SCNC: 139 MMOL/L — SIGNIFICANT CHANGE UP (ref 135–145)
WBC # BLD: 6.51 K/UL — SIGNIFICANT CHANGE UP (ref 3.8–10.5)
WBC # BLD: 6.51 K/UL — SIGNIFICANT CHANGE UP (ref 3.8–10.5)
WBC # FLD AUTO: 6.51 K/UL — SIGNIFICANT CHANGE UP (ref 3.8–10.5)
WBC # FLD AUTO: 6.51 K/UL — SIGNIFICANT CHANGE UP (ref 3.8–10.5)

## 2024-01-14 PROCEDURE — 99239 HOSP IP/OBS DSCHRG MGMT >30: CPT

## 2024-01-14 RX ORDER — OXYCODONE HYDROCHLORIDE 5 MG/1
10 TABLET ORAL EVERY 4 HOURS
Refills: 0 | Status: DISCONTINUED | OUTPATIENT
Start: 2024-01-14 | End: 2024-01-15

## 2024-01-14 RX ORDER — NALOXONE HYDROCHLORIDE 4 MG/.1ML
4 SPRAY NASAL
Qty: 1 | Refills: 0
Start: 2024-01-14

## 2024-01-14 RX ORDER — ACETAMINOPHEN 500 MG
2 TABLET ORAL
Qty: 0 | Refills: 0 | DISCHARGE
Start: 2024-01-14

## 2024-01-14 RX ORDER — IBUPROFEN 200 MG
1 TABLET ORAL
Qty: 15 | Refills: 0
Start: 2024-01-14 | End: 2024-01-18

## 2024-01-14 RX ORDER — OXYCODONE HYDROCHLORIDE 5 MG/1
2 TABLET ORAL
Qty: 60 | Refills: 0
Start: 2024-01-14 | End: 2024-01-18

## 2024-01-14 RX ORDER — OXYCODONE HYDROCHLORIDE 5 MG/1
5 TABLET ORAL EVERY 4 HOURS
Refills: 0 | Status: DISCONTINUED | OUTPATIENT
Start: 2024-01-14 | End: 2024-01-15

## 2024-01-14 RX ORDER — OXYCODONE HYDROCHLORIDE 5 MG/1
5 TABLET ORAL ONCE
Refills: 0 | Status: DISCONTINUED | OUTPATIENT
Start: 2024-01-14 | End: 2024-01-14

## 2024-01-14 RX ORDER — IBUPROFEN 200 MG
800 TABLET ORAL EVERY 8 HOURS
Refills: 0 | Status: DISCONTINUED | OUTPATIENT
Start: 2024-01-14 | End: 2024-01-15

## 2024-01-14 RX ORDER — IBUPROFEN 200 MG
1 TABLET ORAL
Qty: 0 | Refills: 0 | DISCHARGE
Start: 2024-01-14

## 2024-01-14 RX ADMIN — Medication 15 MILLIGRAM(S): at 05:29

## 2024-01-14 RX ADMIN — ENOXAPARIN SODIUM 40 MILLIGRAM(S): 100 INJECTION SUBCUTANEOUS at 05:34

## 2024-01-14 RX ADMIN — Medication 800 MILLIGRAM(S): at 20:31

## 2024-01-14 RX ADMIN — Medication 15 MILLIGRAM(S): at 05:59

## 2024-01-14 RX ADMIN — OXYCODONE HYDROCHLORIDE 5 MILLIGRAM(S): 5 TABLET ORAL at 13:51

## 2024-01-14 RX ADMIN — POLYETHYLENE GLYCOL 3350 17 GRAM(S): 17 POWDER, FOR SOLUTION ORAL at 11:13

## 2024-01-14 RX ADMIN — Medication 15 MILLIGRAM(S): at 10:26

## 2024-01-14 RX ADMIN — GABAPENTIN 100 MILLIGRAM(S): 400 CAPSULE ORAL at 13:51

## 2024-01-14 RX ADMIN — GABAPENTIN 100 MILLIGRAM(S): 400 CAPSULE ORAL at 21:27

## 2024-01-14 RX ADMIN — PIPERACILLIN AND TAZOBACTAM 25 GRAM(S): 4; .5 INJECTION, POWDER, LYOPHILIZED, FOR SOLUTION INTRAVENOUS at 21:26

## 2024-01-14 RX ADMIN — MEMANTINE HYDROCHLORIDE 10 MILLIGRAM(S): 10 TABLET ORAL at 05:29

## 2024-01-14 RX ADMIN — Medication 800 MILLIGRAM(S): at 12:34

## 2024-01-14 RX ADMIN — PIPERACILLIN AND TAZOBACTAM 25 GRAM(S): 4; .5 INJECTION, POWDER, LYOPHILIZED, FOR SOLUTION INTRAVENOUS at 05:30

## 2024-01-14 RX ADMIN — PANTOPRAZOLE SODIUM 40 MILLIGRAM(S): 20 TABLET, DELAYED RELEASE ORAL at 05:29

## 2024-01-14 RX ADMIN — GABAPENTIN 100 MILLIGRAM(S): 400 CAPSULE ORAL at 05:29

## 2024-01-14 RX ADMIN — MEMANTINE HYDROCHLORIDE 10 MILLIGRAM(S): 10 TABLET ORAL at 17:54

## 2024-01-14 RX ADMIN — CHLORHEXIDINE GLUCONATE 15 MILLILITER(S): 213 SOLUTION TOPICAL at 05:29

## 2024-01-14 RX ADMIN — CHLORHEXIDINE GLUCONATE 15 MILLILITER(S): 213 SOLUTION TOPICAL at 17:54

## 2024-01-14 RX ADMIN — PIPERACILLIN AND TAZOBACTAM 25 GRAM(S): 4; .5 INJECTION, POWDER, LYOPHILIZED, FOR SOLUTION INTRAVENOUS at 14:21

## 2024-01-14 RX ADMIN — Medication 800 MILLIGRAM(S): at 20:01

## 2024-01-14 RX ADMIN — OXYCODONE HYDROCHLORIDE 5 MILLIGRAM(S): 5 TABLET ORAL at 11:12

## 2024-01-14 RX ADMIN — Medication 3 MILLIGRAM(S): at 21:47

## 2024-01-14 RX ADMIN — SENNA PLUS 2 TABLET(S): 8.6 TABLET ORAL at 21:28

## 2024-01-14 NOTE — DISCHARGE NOTE PROVIDER - PROVIDER TOKENS
PROVIDER:[TOKEN:[[id number]],ESTABLISHEDPATIENT:[T]] PROVIDER:[TOKEN:[[id number]],ESTABLISHEDPATIENT:[T]] PROVIDER:[TOKEN:[[id number]],ESTABLISHEDPATIENT:[T]]

## 2024-01-14 NOTE — PROGRESS NOTE ADULT - SUBJECTIVE AND OBJECTIVE BOX
Andre Reyes, M.D.  Pager: [phone]  Office: [phone]    Patient is a 73y old  Female who presents with a chief complaint of facial swelling (14 Jan 2024 08:07)          SUBJECTIVE / OVERNIGHT EVENTS:    No acute overnight events.    ROS: ( - ) Fever, ( - )Chills,  ( - )Nausea/Vomiting, ( - ) Cough, ( - )Shortness of breath, ( - )Chest Pain    MEDICATIONS  (STANDING):  chlorhexidine 0.12% Liquid 15 milliLiter(s) Swish and Spit two times a day  dextrose 5% + sodium chloride 0.45%. 1000 milliLiter(s) (80 mL/Hr) IV Continuous <Continuous>  enoxaparin Injectable 40 milliGRAM(s) SubCutaneous every 24 hours  gabapentin 100 milliGRAM(s) Oral three times a day  ketorolac   Injectable 15 milliGRAM(s) IV Push every 6 hours  memantine 10 milliGRAM(s) Oral two times a day  naloxone Injectable 0.4 milliGRAM(s) IV Push once  pantoprazole    Tablet 40 milliGRAM(s) Oral before breakfast  piperacillin/tazobactam IVPB.. 3.375 Gram(s) IV Intermittent every 8 hours  polyethylene glycol 3350 17 Gram(s) Oral daily  senna 2 Tablet(s) Oral at bedtime    MEDICATIONS  (PRN):  acetaminophen     Tablet .. 650 milliGRAM(s) Oral every 6 hours PRN Temp greater or equal to 38C (100.4F), Mild Pain (1 - 3)  aluminum hydroxide/magnesium hydroxide/simethicone Suspension 30 milliLiter(s) Oral every 4 hours PRN Dyspepsia  bisacodyl 5 milliGRAM(s) Oral daily PRN Constipation  melatonin 3 milliGRAM(s) Oral at bedtime PRN Insomnia  ondansetron Injectable 4 milliGRAM(s) IV Push every 8 hours PRN Nausea and/or Vomiting  oxyCODONE    IR 5 milliGRAM(s) Oral every 4 hours PRN Severe Pain (7 - 10)  oxyCODONE    IR 2.5 milliGRAM(s) Oral every 4 hours PRN Moderate Pain (4 - 6)          T(C): 36.9 (01-14 @ 09:13), Max: 37.1 (01-13 @ 13:03)   HR: 87   BP: 108/70   RR: 18   SpO2: 96%    PHYSICAL EXAM:    CONSTITUTIONAL: NAD, well-developed, well-groomed  EYES: PERRLA; conjunctiva and sclera clear  ENMT: Moist oral mucosa, no pharyngeal injection or exudates; normal dentition  NECK: Supple, no palpable masses; no thyromegaly  RESPIRATORY: Normal respiratory effort; lungs are clear to auscultation bilaterally  CARDIOVASCULAR: Regular rate and rhythm, normal S1 and S2, no murmur/rub/gallop; No lower extremity edema; Peripheral pulses are 2+ bilaterally  ABDOMEN: Nontender to palpation, normoactive bowel sounds, no rebound/guarding; No hepatosplenomegaly  MUSCULOSKELETAL:  no clubbing or cyanosis of digits; no joint swelling or tenderness to palpation  PSYCH: A+O to person, place, and time; affect appropriate  NEUROLOGY: CN 2-12 are intact and symmetric; no gross sensory deficits   SKIN: No rashes; no palpable lesions      LABS:                        11.8   6.51  )-----------( 168      ( 14 Jan 2024 07:35 )             36.7      01-14    139  |  103  |  6<L>  ----------------------------<  85  4.4   |  26  |  0.62    Ca    9.0      14 Jan 2024 07:40  Phos  4.1     01-14  Mg     1.6     01-14         CAPILLARY BLOOD GLUCOSE          RADIOLOGY & ADDITIONAL TESTS:    Imaging Personally Reviewed:  Consultant(s) Notes Reviewed:    Care Discussed with Consultants/Other Providers:   Andre Reyes, M.D.  Pager: [phone]  Office: [phone]    Patient is a 73y old  Female who presents with a chief complaint of facial swelling (14 Jan 2024 08:07)          SUBJECTIVE / OVERNIGHT EVENTS:    No acute overnight events.    ROS: ( - ) Fever, ( - )Chills,  ( - )Nausea/Vomiting, ( - ) Cough, ( - )Shortness of breath, ( - )Chest Pain    MEDICATIONS  (STANDING):  chlorhexidine 0.12% Liquid 15 milliLiter(s) Swish and Spit two times a day  dextrose 5% + sodium chloride 0.45%. 1000 milliLiter(s) (80 mL/Hr) IV Continuous <Continuous>  enoxaparin Injectable 40 milliGRAM(s) SubCutaneous every 24 hours  gabapentin 100 milliGRAM(s) Oral three times a day  ketorolac   Injectable 15 milliGRAM(s) IV Push every 6 hours  memantine 10 milliGRAM(s) Oral two times a day  naloxone Injectable 0.4 milliGRAM(s) IV Push once  pantoprazole    Tablet 40 milliGRAM(s) Oral before breakfast  piperacillin/tazobactam IVPB.. 3.375 Gram(s) IV Intermittent every 8 hours  polyethylene glycol 3350 17 Gram(s) Oral daily  senna 2 Tablet(s) Oral at bedtime    MEDICATIONS  (PRN):  acetaminophen     Tablet .. 650 milliGRAM(s) Oral every 6 hours PRN Temp greater or equal to 38C (100.4F), Mild Pain (1 - 3)  aluminum hydroxide/magnesium hydroxide/simethicone Suspension 30 milliLiter(s) Oral every 4 hours PRN Dyspepsia  bisacodyl 5 milliGRAM(s) Oral daily PRN Constipation  melatonin 3 milliGRAM(s) Oral at bedtime PRN Insomnia  ondansetron Injectable 4 milliGRAM(s) IV Push every 8 hours PRN Nausea and/or Vomiting  oxyCODONE    IR 5 milliGRAM(s) Oral every 4 hours PRN Severe Pain (7 - 10)  oxyCODONE    IR 2.5 milliGRAM(s) Oral every 4 hours PRN Moderate Pain (4 - 6)          T(C): 36.9 (01-14 @ 09:13), Max: 37.1 (01-13 @ 13:03)   HR: 87   BP: 108/70   RR: 18   SpO2: 96%    PHYSICAL EXAM:    CONSTITUTIONAL: NAD, well-developed, well-groomed  EYES: PERRLA; conjunctiva and sclera clear  ENMT: Moist oral mucosa, no pharyngeal injection or exudates; normal dentition  NECK: Supple, no palpable masses; no thyromegaly  RESPIRATORY: Normal respiratory effort; lungs are clear to auscultation bilaterally  CARDIOVASCULAR: Regular rate and rhythm, normal S1 and S2, no murmur/rub/gallop; No lower extremity edema; Peripheral pulses are 2+ bilaterally  ABDOMEN: Nontender to palpation, normoactive bowel sounds, no rebound/guarding; No hepatosplenomegaly  MUSCULOSKELETAL:  no clubbing or cyanosis of digits; no joint swelling or tenderness to palpation  PSYCH: A+O to person, place, and time; affect appropriate  NEUROLOGY: CN 2-12 are intact and symmetric; no gross sensory deficits   SKIN: No rashes; no palpable lesions      LABS:                        11.8   6.51  )-----------( 168      ( 14 Jan 2024 07:35 )             36.7      01-14    139  |  103  |  6<L>  ----------------------------<  85  4.4   |  26  |  0.62    Ca    9.0      14 Jan 2024 07:40  Phos  4.1     01-14  Mg     1.6     01-14         CAPILLARY BLOOD GLUCOSE          RADIOLOGY & ADDITIONAL TESTS:    Imaging Personally Reviewed:  Consultant(s) Notes Reviewed:    Care Discussed with Consultants/Other Providers:   Andre Reyes, M.D.  Pager: [phone]  Office: [phone]    Patient is a 73y old  Female who presents with a chief complaint of facial swelling (14 Jan 2024 08:07)          SUBJECTIVE / OVERNIGHT EVENTS:    No acute overnight events.  Still complaints of tongue pain. has teeth pain with chewing.    ROS: ( - ) Fever, ( - )Chills,  ( - )Nausea/Vomiting, ( - ) Cough, ( - )Shortness of breath, ( - )Chest Pain    MEDICATIONS  (STANDING):  chlorhexidine 0.12% Liquid 15 milliLiter(s) Swish and Spit two times a day  dextrose 5% + sodium chloride 0.45%. 1000 milliLiter(s) (80 mL/Hr) IV Continuous <Continuous>  enoxaparin Injectable 40 milliGRAM(s) SubCutaneous every 24 hours  gabapentin 100 milliGRAM(s) Oral three times a day  ketorolac   Injectable 15 milliGRAM(s) IV Push every 6 hours  memantine 10 milliGRAM(s) Oral two times a day  naloxone Injectable 0.4 milliGRAM(s) IV Push once  pantoprazole    Tablet 40 milliGRAM(s) Oral before breakfast  piperacillin/tazobactam IVPB.. 3.375 Gram(s) IV Intermittent every 8 hours  polyethylene glycol 3350 17 Gram(s) Oral daily  senna 2 Tablet(s) Oral at bedtime    MEDICATIONS  (PRN):  acetaminophen     Tablet .. 650 milliGRAM(s) Oral every 6 hours PRN Temp greater or equal to 38C (100.4F), Mild Pain (1 - 3)  aluminum hydroxide/magnesium hydroxide/simethicone Suspension 30 milliLiter(s) Oral every 4 hours PRN Dyspepsia  bisacodyl 5 milliGRAM(s) Oral daily PRN Constipation  melatonin 3 milliGRAM(s) Oral at bedtime PRN Insomnia  ondansetron Injectable 4 milliGRAM(s) IV Push every 8 hours PRN Nausea and/or Vomiting  oxyCODONE    IR 5 milliGRAM(s) Oral every 4 hours PRN Severe Pain (7 - 10)  oxyCODONE    IR 2.5 milliGRAM(s) Oral every 4 hours PRN Moderate Pain (4 - 6)          T(C): 36.9 (01-14 @ 09:13), Max: 37.1 (01-13 @ 13:03)   HR: 87   BP: 108/70   RR: 18   SpO2: 96%    PHYSICAL EXAM:    CONSTITUTIONAL: NAD, well-developed, well-groomed  EYES: PERRLA; conjunctiva and sclera clear  ENMT: tongue edema improved, still will edema of the right lower gum line, +submandibular edema  NECK: Supple, no palpable masses; no thyromegaly  RESPIRATORY: Normal respiratory effort; lungs are clear to auscultation bilaterally  CARDIOVASCULAR: Regular rate and rhythm, normal S1 and S2, no murmur/rub/gallop; No lower extremity edema; Peripheral pulses are 2+ bilaterally  ABDOMEN: Nontender to palpation, normoactive bowel sounds, no rebound/guarding; No hepatosplenomegaly  MUSCULOSKELETAL:  no clubbing or cyanosis of digits; no joint swelling or tenderness to palpation  PSYCH: A+O to person, place, and time; affect appropriate  NEUROLOGY: CN 2-12 are intact and symmetric; no gross sensory deficits   SKIN: No rashes; no palpable lesions      LABS:                        11.8   6.51  )-----------( 168      ( 14 Jan 2024 07:35 )             36.7      01-14    139  |  103  |  6<L>  ----------------------------<  85  4.4   |  26  |  0.62    Ca    9.0      14 Jan 2024 07:40  Phos  4.1     01-14  Mg     1.6     01-14         CAPILLARY BLOOD GLUCOSE          RADIOLOGY & ADDITIONAL TESTS:    Imaging Personally Reviewed:  Consultant(s) Notes Reviewed:    Care Discussed with Consultants/Other Providers:   Andre Reyes, M.D.  Pager: [phone]  Office: [phone]    Patient is a 73y old  Female who presents with a chief complaint of facial swelling (14 Jan 2024 08:07)          SUBJECTIVE / OVERNIGHT EVENTS:    No acute overnight events.  Still complaints of tongue pain. has teeth pain with chewing.    ROS: ( - ) Fever, ( - )Chills,  ( - )Nausea/Vomiting, ( - ) Cough, ( - )Shortness of breath, ( - )Chest Pain    MEDICATIONS  (STANDING):  chlorhexidine 0.12% Liquid 15 milliLiter(s) Swish and Spit two times a day  dextrose 5% + sodium chloride 0.45%. 1000 milliLiter(s) (80 mL/Hr) IV Continuous <Continuous>  enoxaparin Injectable 40 milliGRAM(s) SubCutaneous every 24 hours  gabapentin 100 milliGRAM(s) Oral three times a day  ketorolac   Injectable 15 milliGRAM(s) IV Push every 6 hours  memantine 10 milliGRAM(s) Oral two times a day  naloxone Injectable 0.4 milliGRAM(s) IV Push once  pantoprazole    Tablet 40 milliGRAM(s) Oral before breakfast  piperacillin/tazobactam IVPB.. 3.375 Gram(s) IV Intermittent every 8 hours  polyethylene glycol 3350 17 Gram(s) Oral daily  senna 2 Tablet(s) Oral at bedtime    MEDICATIONS  (PRN):  acetaminophen     Tablet .. 650 milliGRAM(s) Oral every 6 hours PRN Temp greater or equal to 38C (100.4F), Mild Pain (1 - 3)  aluminum hydroxide/magnesium hydroxide/simethicone Suspension 30 milliLiter(s) Oral every 4 hours PRN Dyspepsia  bisacodyl 5 milliGRAM(s) Oral daily PRN Constipation  melatonin 3 milliGRAM(s) Oral at bedtime PRN Insomnia  ondansetron Injectable 4 milliGRAM(s) IV Push every 8 hours PRN Nausea and/or Vomiting  oxyCODONE    IR 5 milliGRAM(s) Oral every 4 hours PRN Severe Pain (7 - 10)  oxyCODONE    IR 2.5 milliGRAM(s) Oral every 4 hours PRN Moderate Pain (4 - 6)          T(C): 36.9 (01-14 @ 09:13), Max: 37.1 (01-13 @ 13:03)   HR: 87   BP: 108/70   RR: 18   SpO2: 96%    PHYSICAL EXAM:    CONSTITUTIONAL: NAD, well-developed, well-groomed  EYES: PERRLA; conjunctiva and sclera clear  ENMT: tongue edema improved, still will edema of the right lower gum line, +submandibular edema  NECK: Supple, no palpable masses; no thyromegaly  RESPIRATORY: Normal respiratory effort; lungs are clear to auscultation bilaterally  CARDIOVASCULAR: Regular rate and rhythm, normal S1 and S2, no murmur/rub/gallop; No lower extremity edema; Peripheral pulses are 2+ bilaterally  ABDOMEN: Nontender to palpation, normoactive bowel sounds, no rebound/guarding; No hepatosplenomegaly  MUSCULOSKELETAL:  no clubbing or cyanosis of digits; no joint swelling or tenderness to palpation  PSYCH: A+O to person, place, and time; affect appropriate  NEUROLOGY: CN 2-12 are intact and symmetric; no gross sensory deficits   SKIN: No rashes; no palpable lesions      LABS:                        11.8   6.51  )-----------( 168      ( 14 Jan 2024 07:35 )             36.7      01-14    139  |  103  |  6<L>  ----------------------------<  85  4.4   |  26  |  0.62    Ca    9.0      14 Jan 2024 07:40  Phos  4.1     01-14  Mg     1.6     01-14         CAPILLARY BLOOD GLUCOSE          RADIOLOGY & ADDITIONAL TESTS:    Imaging Personally Reviewed:  Consultant(s) Notes Reviewed:    Care Discussed with Consultants/Other Providers:

## 2024-01-14 NOTE — DISCHARGE NOTE PROVIDER - NSDCCPCAREPLAN_GEN_ALL_CORE_FT
PRINCIPAL DISCHARGE DIAGNOSIS  Diagnosis: Submandibular swelling  Assessment and Plan of Treatment: You were found to have an infected mass of your jaw. This was cleaned and drained by oral surgeons. You were treated with antibiotics. You will be prescribed oral antibiotics until 1/20 and pain medications to use as needed. You will need to follow up with the oral surgeon 2 weeks after discharge for further management.

## 2024-01-14 NOTE — PROGRESS NOTE ADULT - SUBJECTIVE AND OBJECTIVE BOX
HPI: 73 F with pmhx memory issues and osteoporosis presents ED complaining of worsening facial swelling and pain.  Patient reports that in September 2023 she had a tooth extracted and then developed MRONJ  thought to be related to Prolia injections.  She reports that she has been following with both [facility] Dr. Eteinne Cooper who performed a washout procedure in November 2023 and with ENT Dr. Urbano Andujar as well as her PCP.  She reports that over the last month has had increased swelling to the lower jaw area with increased pain which acutely progressed 4 days ago. She was prescribed Biaxin which she has been taking for approximately 1 week without much relief. Saw ENT yesterday who recommended patient come to the emergency department for IV antibiotics. She has not seen her oral surgeon since procedure.  CT+  hypodense lesion with irregular rind of enhancement centered in the right aspect of the floor of mouth measuring approximately 3.3 x 2.4 x 2.6 cm. Patient reports that she has trismus, dysphagia, odynophagia and difficulty eating secondary to swelling and has lost several pounds.  She denies fevers, chills, difficulty breathing, chest pain, shortness of breath abdominal pain nausea or vomiting. Pt on Zosyn in ED. Now s/p bedside incision and drainage of floor of mouth collection with 2x cultures, saline washout, and subperiosteal dissection along lingual aspect of anterior mandible on 1/11.     Interval Events: NAEON. AVSS      Vital Signs Last 24 Hrs  T(C): 36.4 (14 Jan 2024 04:51), Max: 37.1 (13 Jan 2024 13:03)  T(F): 97.5 (14 Jan 2024 04:51), Max: 98.7 (13 Jan 2024 13:03)  HR: 72 (14 Jan 2024 04:51) (69 - 78)  BP: 112/83 (14 Jan 2024 04:51) (106/63 - 128/75)  BP(mean): --  RR: 18 (14 Jan 2024 04:51) (18 - 18)  SpO2: 99% (14 Jan 2024 04:51) (98% - 99%)    Parameters below as of 14 Jan 2024 04:51  Patient On (Oxygen Delivery Method): room air    I&O's Detail    13 Jan 2024 07:01  -  14 Jan 2024 07:00  --------------------------------------------------------  IN:    Oral Fluid: 480 mL  Total IN: 480 mL    OUT:    Voided (mL): 350 mL  Total OUT: 350 mL    Total NET: 130 mL    Medications:    MEDICATIONS  (STANDING):  chlorhexidine 0.12% Liquid 15 milliLiter(s) Swish and Spit two times a day  dextrose 5% + sodium chloride 0.45%. 1000 milliLiter(s) (80 mL/Hr) IV Continuous <Continuous>  enoxaparin Injectable 40 milliGRAM(s) SubCutaneous every 24 hours  gabapentin 100 milliGRAM(s) Oral three times a day  ketorolac   Injectable 15 milliGRAM(s) IV Push every 6 hours  memantine 10 milliGRAM(s) Oral two times a day  naloxone Injectable 0.4 milliGRAM(s) IV Push once  pantoprazole    Tablet 40 milliGRAM(s) Oral before breakfast  piperacillin/tazobactam IVPB.. 3.375 Gram(s) IV Intermittent every 8 hours  polyethylene glycol 3350 17 Gram(s) Oral daily  senna 2 Tablet(s) Oral at bedtime    MEDICATIONS  (PRN):  acetaminophen     Tablet .. 650 milliGRAM(s) Oral every 6 hours PRN Temp greater or equal to 38C (100.4F), Mild Pain (1 - 3)  aluminum hydroxide/magnesium hydroxide/simethicone Suspension 30 milliLiter(s) Oral every 4 hours PRN Dyspepsia  bisacodyl 5 milliGRAM(s) Oral daily PRN Constipation  melatonin 3 milliGRAM(s) Oral at bedtime PRN Insomnia  ondansetron Injectable 4 milliGRAM(s) IV Push every 8 hours PRN Nausea and/or Vomiting  oxyCODONE    IR 2.5 milliGRAM(s) Oral every 4 hours PRN Moderate Pain (4 - 6)  oxyCODONE    IR 5 milliGRAM(s) Oral every 4 hours PRN Severe Pain (7 - 10)        Labs:                          11.8   6.51  )-----------( 168      ( 14 Jan 2024 07:35 )             36.7         Focused Maxillofacial Physical Exam:   AAOx3, NAD, resting comfortably, speaking w/o difficulty - improvement in submental edema, soft now, FOM soft, tongue with restricted ROM likely secondary edema from drainage, no latasha purulence on exam. Tolerating secretions,     HPI: 73 F with pmhx memory issues and osteoporosis presents ED complaining of worsening facial swelling and pain.  Patient reports that in September 2023 she had a tooth extracted and then developed MRONJ  thought to be related to Prolia injections.  She reports that she has been following with both [facility] Dr. Etienne Cooper who performed a washout procedure in November 2023 and with ENT Dr. Urbano Andujar as well as her PCP.  She reports that over the last month has had increased swelling to the lower jaw area with increased pain which acutely progressed 4 days ago. She was prescribed Biaxin which she has been taking for approximately 1 week without much relief. Saw ENT yesterday who recommended patient come to the emergency department for IV antibiotics. She has not seen her oral surgeon since procedure.  CT+  hypodense lesion with irregular rind of enhancement centered in the right aspect of the floor of mouth measuring approximately 3.3 x 2.4 x 2.6 cm. Patient reports that she has trismus, dysphagia, odynophagia and difficulty eating secondary to swelling and has lost several pounds.  She denies fevers, chills, difficulty breathing, chest pain, shortness of breath abdominal pain nausea or vomiting. Pt on Zosyn in ED. Now s/p bedside incision and drainage of floor of mouth collection with 2x cultures, saline washout, and subperiosteal dissection along lingual aspect of anterior mandible on 1/11.     Interval Events: NAEON. AVSS      Vital Signs Last 24 Hrs  T(C): 36.4 (14 Jan 2024 04:51), Max: 37.1 (13 Jan 2024 13:03)  T(F): 97.5 (14 Jan 2024 04:51), Max: 98.7 (13 Jan 2024 13:03)  HR: 72 (14 Jan 2024 04:51) (69 - 78)  BP: 112/83 (14 Jan 2024 04:51) (106/63 - 128/75)  BP(mean): --  RR: 18 (14 Jan 2024 04:51) (18 - 18)  SpO2: 99% (14 Jan 2024 04:51) (98% - 99%)    Parameters below as of 14 Jan 2024 04:51  Patient On (Oxygen Delivery Method): room air    I&O's Detail    13 Jan 2024 07:01  -  14 Jan 2024 07:00  --------------------------------------------------------  IN:    Oral Fluid: 480 mL  Total IN: 480 mL    OUT:    Voided (mL): 350 mL  Total OUT: 350 mL    Total NET: 130 mL    Medications:    MEDICATIONS  (STANDING):  chlorhexidine 0.12% Liquid 15 milliLiter(s) Swish and Spit two times a day  dextrose 5% + sodium chloride 0.45%. 1000 milliLiter(s) (80 mL/Hr) IV Continuous <Continuous>  enoxaparin Injectable 40 milliGRAM(s) SubCutaneous every 24 hours  gabapentin 100 milliGRAM(s) Oral three times a day  ketorolac   Injectable 15 milliGRAM(s) IV Push every 6 hours  memantine 10 milliGRAM(s) Oral two times a day  naloxone Injectable 0.4 milliGRAM(s) IV Push once  pantoprazole    Tablet 40 milliGRAM(s) Oral before breakfast  piperacillin/tazobactam IVPB.. 3.375 Gram(s) IV Intermittent every 8 hours  polyethylene glycol 3350 17 Gram(s) Oral daily  senna 2 Tablet(s) Oral at bedtime    MEDICATIONS  (PRN):  acetaminophen     Tablet .. 650 milliGRAM(s) Oral every 6 hours PRN Temp greater or equal to 38C (100.4F), Mild Pain (1 - 3)  aluminum hydroxide/magnesium hydroxide/simethicone Suspension 30 milliLiter(s) Oral every 4 hours PRN Dyspepsia  bisacodyl 5 milliGRAM(s) Oral daily PRN Constipation  melatonin 3 milliGRAM(s) Oral at bedtime PRN Insomnia  ondansetron Injectable 4 milliGRAM(s) IV Push every 8 hours PRN Nausea and/or Vomiting  oxyCODONE    IR 2.5 milliGRAM(s) Oral every 4 hours PRN Moderate Pain (4 - 6)  oxyCODONE    IR 5 milliGRAM(s) Oral every 4 hours PRN Severe Pain (7 - 10)        Labs:                          11.8   6.51  )-----------( 168      ( 14 Jan 2024 07:35 )             36.7         Focused Maxillofacial Physical Exam:   AAOx3, NAD, resting comfortably, speaking w/o difficulty - improvement in submental edema, soft now, FOM soft, tongue with restricted ROM likely secondary edema from drainage, no latasha purulence on exam. Tolerating secretions,     HPI: 73 F with pmhx memory issues and osteoporosis presents ED complaining of worsening facial swelling and pain.  Patient reports that in September 2023 she had a tooth extracted and then developed MRONJ  thought to be related to Prolia injections.  She reports that she has been following with both [facility] Dr. Etienne Cooper who performed a washout procedure in November 2023 and with ENT Dr. Urbano Andujar as well as her PCP.  She reports that over the last month has had increased swelling to the lower jaw area with increased pain which acutely progressed 4 days ago. She was prescribed Biaxin which she has been taking for approximately 1 week without much relief. Saw ENT yesterday who recommended patient come to the emergency department for IV antibiotics. She has not seen her oral surgeon since procedure.  CT+  hypodense lesion with irregular rind of enhancement centered in the right aspect of the floor of mouth measuring approximately 3.3 x 2.4 x 2.6 cm. Patient reports that she has trismus, dysphagia, odynophagia and difficulty eating secondary to swelling and has lost several pounds.  She denies fevers, chills, difficulty breathing, chest pain, shortness of breath abdominal pain nausea or vomiting. Pt on Zosyn in ED. Now s/p bedside incision and drainage of floor of mouth collection with 2x cultures, saline washout, and subperiosteal dissection along lingual aspect of anterior mandible on 1/11.     Interval Events: NAEON, VSS/AF, improvement in submental edema, soft now, FOM soft, tongue with increased ROM, no latasha purulence on exam. Tolerating secretions, speaking w/o difficulty. WBC: 6.5      Focused Maxillofacial Physical Exam:   AAOx3, NAD, resting comfortably, speaking w/o difficulty - improvement in submental edema, soft now, FOM soft, tongue with increased ROM, no latasha purulence on exam. Tolerating secretions,

## 2024-01-14 NOTE — DISCHARGE NOTE NURSING/CASE MANAGEMENT/SOCIAL WORK - NSDCFUADDAPPT_GEN_ALL_CORE_FT
APPTS ARE READY TO BE MADE: [ x] YES    Best Family or Patient Contact (if needed):    Additional Information about above appointments (if needed):    1: Dr. Cooper (in 2 weeks)  2:   3:     Other comments or requests:

## 2024-01-14 NOTE — PROGRESS NOTE ADULT - ASSESSMENT
74yo 53kg f w pmh mci/dementia (alzheimer), anx/dep/insomnia, gerd/lprd, osteoporosis, mronj, fibromyalgia, ibs-c c/b diverticulosis + hemorrhoids, p/w facial swelling a/w orofacial pain; in er, found to have necrotic mass in the floor of mouth; admit to medicine for further mgmt.  72yo 53kg f w pmh mci/dementia (alzheimer), anx/dep/insomnia, gerd/lprd, osteoporosis, mronj, fibromyalgia, ibs-c c/b diverticulosis + hemorrhoids, p/w facial swelling a/w orofacial pain; in er, found to have necrotic mass in the floor of mouth; admit to medicine for further mgmt.

## 2024-01-14 NOTE — DISCHARGE NOTE NURSING/CASE MANAGEMENT/SOCIAL WORK - PATIENT PORTAL LINK FT
You can access the FollowMyHealth Patient Portal offered by [facility] by registering at the following website: [email or web address]. By joining [employer]’s FollowMyHealth portal, you will also be able to view your health information using other applications (apps) compatible with our system. You can access the FollowMyHealth Patient Portal offered by [facility] by registering at the following website: [email or web address]. By joining [employer]’s FollowMyHealth portal, you will also be able to view your health information using other applications (apps) compatible with our system.

## 2024-01-14 NOTE — PROGRESS NOTE ADULT - ASSESSMENT
Assessment	  73 F with pmhx memory issues and osteoporosis presents ED complaining of worsening facial swelling and pain. Hypodense lesion with irregular ri of enhancement centered in the right aspect of the floor of mouth measuring approximately 3.3 x 2.4 x 2.6 cm. Clinical presentation c/w abscess v necrotic mass. Given normal WBC, febrile low concern for acute infection. Given pts clinical exam w/ firm floor of mouth, neck mass and weight loss concern for neoplastic process. Now s/p bedside incision and drainage of floor of mouth collection with 2x cultures, saline washout, and subperiosteal dissection along lingual aspect of anterior mandible (1/11/24). Pt progressing well in immediate post-operative period.     Plan:  - C/w IV abx (Zosyn)   - F/u cultures speciation and sensitivities to tailor Abx coverage   - Diet as tolerated   - Patient will require definitive tx with debridement of necrotic bone at mandible after resolution of acute infection - to be coordinated as outpatient.    - Appreciate rest of care per primary team        [facility]illof[facility]  [facility] [phone]     Assessment	  73 F with pmhx memory issues and osteoporosis presents ED complaining of worsening facial swelling and pain. Hypodense lesion with irregular ri of enhancement centered in the right aspect of the floor of mouth measuring approximately 3.3 x 2.4 x 2.6 cm. Clinical presentation c/w abscess v necrotic mass. Given normal WBC, febrile low concern for acute infection. Given pts clinical exam w/ firm floor of mouth, neck mass and weight loss concern for neoplastic process. Now s/p bedside incision and drainage of floor of mouth collection with 2x cultures, saline washout, and subperiosteal dissection along lingual aspect of anterior mandible (1/11/24). Pt progressing well in immediate post-operative period.     Plan:  - C/w IV abx (Zosyn)   - F/u cultures speciation and sensitivities to tailor Abx coverage   - Diet as tolerated   - Patient will require definitive tx with debridement of necrotic bone at mandible after resolution of acute infection - to be coordinated as outpatient.    - Appreciate rest of care per primary team        [facility]illofacial [facility]  [facility] Pager [phone]     Assessment	  73 F with pmhx memory issues and osteoporosis presents ED complaining of worsening facial swelling and pain. Hypodense lesion with irregular ri of enhancement centered in the right aspect of the floor of mouth measuring approximately 3.3 x 2.4 x 2.6 cm. Clinical presentation c/w abscess v necrotic mass. Given normal WBC, febrile low concern for acute infection. Given pts clinical exam w/ firm floor of mouth, neck mass and weight loss concern for neoplastic process. Now s/p bedside incision and drainage of floor of mouth collection with 2x cultures, saline washout, and subperiosteal dissection along lingual aspect of anterior mandible (1/11/24). Pt progressing well in immediate post-operative period.     Plan:  - C/w IV abx (Zosyn)     Patient clear for discharge home from OMFS standpoint.  Patient will require definitive tx with debridement of necrotic bone at mandible after resolution of acute infection - to be coordinated as outpatient.    Patient to follow up outpatient with managing outpatient Oral Maxillofacial Surgeon Dr. Etienne Cooper     [facility]  Dr. Etienne Cooper   [address]  [place] [address]  TEL: [phone]    Melissa Smith  Oral Maxillofacial Surgery  [facility]: [id number]  [facility]: [phone]  [facility]: [phone]  Available on Microsoft Teams     Assessment	  73 F with pmhx memory issues and osteoporosis presents ED complaining of worsening facial swelling and pain. Hypodense lesion with irregular ri of enhancement centered in the right aspect of the floor of mouth measuring approximately 3.3 x 2.4 x 2.6 cm. Clinical presentation c/w abscess v necrotic mass. Given normal WBC, febrile low concern for acute infection. Given pts clinical exam w/ firm floor of mouth, neck mass and weight loss concern for neoplastic process. Now s/p bedside incision and drainage of floor of mouth collection with 2x cultures, saline washout, and subperiosteal dissection along lingual aspect of anterior mandible (1/11/24). Pt progressing well in immediate post-operative period.     Plan:  - C/w IV abx (Zosyn)     Patient clear for discharge home from OMFS standpoint.  Patient will require definitive tx with debridement of necrotic bone at mandible after resolution of acute infection - to be coordinated as outpatient.    Patient to follow up outpatient with managing outpatient Oral Maxillofacial Surgeon Dr. Etienne Cooper     [facility] Orth[facility]ath[facility]ill[facility]acial [facility]  Dr. Etienne Cooper   [address]  [place] [address]  TEL: [phone]    Melissa Smith  Oral Maxillofacial Surgery  [facility]: [id number]  [facility]: [phone]  [facility]: [phone]  Available on Microsoft Teams     Assessment	  73 F with pmhx memory issues and osteoporosis presents ED complaining of worsening facial swelling and pain. Hypodense lesion with irregular ri of enhancement centered in the right aspect of the floor of mouth measuring approximately 3.3 x 2.4 x 2.6 cm. Clinical presentation c/w abscess v necrotic mass. Patient now s/p bedside incision and drainage of floor of mouth collection with 2x cultures, saline washout, and subperiosteal dissection along lingual aspect of anterior mandible (1/11/24). Pt progressing well in immediate post-operative period.     Plan:  - C/w IV abx (Zosyn) while admitted - can transition to Augmentin 875mg BID for 7 additional days if/when discharged.     Patient clear for discharge home from OMFS standpoint.  Patient will require definitive tx with debridement of necrotic bone at mandible after resolution of acute infection - to be coordinated as outpatient.    Patient to follow up outpatient with managing outpatient Oral Maxillofacial Surgeon Dr. Etienne Cooper     Outpatient follow up:   [facility] Orthogn[facility]acial [facility]  Dr. Etienne Cooper   [address]  [place] [address]  TEL: [phone]        Melissa Smith  Oral Maxillofacial Surgery  [facility]: [id number]  [facility]: [phone]  [facility]: [phone]  Available on Microsoft Teams     Assessment	  73 F with pmhx memory issues and osteoporosis presents ED complaining of worsening facial swelling and pain. Hypodense lesion with irregular ri of enhancement centered in the right aspect of the floor of mouth measuring approximately 3.3 x 2.4 x 2.6 cm. Clinical presentation c/w abscess v necrotic mass. Patient now s/p bedside incision and drainage of floor of mouth collection with 2x cultures, saline washout, and subperiosteal dissection along lingual aspect of anterior mandible (1/11/24). Pt progressing well in immediate post-operative period.     Plan:  - C/w IV abx (Zosyn) while admitted - can transition to Augmentin 875mg BID for 7 additional days if/when discharged.     Patient clear for discharge home from OMFS standpoint.  Patient will require definitive tx with debridement of necrotic bone at mandible after resolution of acute infection - to be coordinated as outpatient.    Patient to follow up outpatient with managing outpatient Oral Maxillofacial Surgeon Dr. Etienne Cooper     Outpatient follow up:   [facility]ath[facility]  Dr. Etienne Cooper   [address]  [place] [address]  TEL: [phone]        Melissa Smith  Oral Maxillofacial Surgery  [facility]: [id number]  [facility]: [phone]  [facility]: [phone]  Available on [employer] Teams

## 2024-01-14 NOTE — PROGRESS NOTE ADULT - PROBLEM SELECTOR PLAN 3
h/o mci/dementia (alzheimer), anx/dep/insomnia  outpatient neuro and int med documentation reviewed  patient with progressively worsening decline in short term memory  cont home memantine (was being planned for starting rivastigmine and dc'ing memantine)  neuro follow up upon discharge  Thyroid Stimulating Hormone, Serum: 1.71 uIU/mL (01.11.24 @ 05:55)  Vitamin B12, Serum: >2000 pg/mL (01.12.24 @ 07:24)  Folate, Serum: 5.5 ng/mL (01.12.24 @ 07:24)  RPR negative

## 2024-01-14 NOTE — PROGRESS NOTE ADULT - PROBLEM SELECTOR PLAN 1
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, but superimposed infection and abscess formation is not excluded."  s/p I&D - subperiosteal dissection along lingual aspect of anterior mandible, washed out with saline  Pain control- standing toradol, oxycodone PRN  Peridex mouth wash BID  abscess cxs- ngtd, bld cxs - ngtd  will advance to a regular diet as tolerated  will transition to augmentin 875mg BID x 7 more days

## 2024-01-14 NOTE — DISCHARGE NOTE PROVIDER - DETAILS OF MALNUTRITION DIAGNOSIS/DIAGNOSES
This patient has been assessed with a concern for Malnutrition and was treated during this hospitalization for the following Nutrition diagnosis/diagnoses:     -  01/12/2024: Mild protein-calorie malnutrition

## 2024-01-14 NOTE — DISCHARGE NOTE PROVIDER - HOSPITAL COURSE
HPI:  74yo 53kg f w pmh mci/dementia (alzheimer), anx/dep/insomnia, gerd/lprd, osteoporosis, mronj, fibromyalgia, ibs-c c/b diverticulosis + hemorrhoids, p/w facial swelling a/w orofacial pain; She reports that over the last month she has had increased swelling to the lower jaw area with pain which acutely progressed 2 days ago.  She was prescribed Biaxin which she has been taking for approximately 1 week without relief.  Went to outpatient ENT, recommended patient  to go the er. so patient presents to [facility] er for further evaluation. in er, found to have necrotic mass in the floor of mouth; admit to medicine for further mgmt.  (10 Fermin 2024 23:36)    Hospital Course:  Imaging significant for "peripherally enhancing necrotic mass in the right anterior floor the mouth, likely reflecting necrotic neoplasm, but superimposed infection and abscess formation is not excluded." Pt evaluated by OMFS. S/p I&D - subperosteal dissection along lingual aspect of anterior mandible, washed out with saline. Treated with antibiotics. Will complete PO abx on 1/20. Stable for discharge with outpatient OMFS follow up in 2 weeks for definitive tx with debridement of necrotic bone at mandible after resolution of acute infection.    Important Medication Changes and Reason:  Augmentin 875 BID until 1/20  Oxycodone 5 mg for moderate pain or 10 mg for severe pain every 4 hours as needed  Ibuprofen 800 mg TID for 5 days    Active or Pending Issues Requiring Follow-up:  F/u with oral surgeon Dr. Etienne Cooper in 2 weeks for debridement of necrotic bone at mandible    Advanced Directives:   [x ] Full code  [ ] DNR  [ ] Hospice    Discharge Diagnoses:  Infected mandibular mass with necrosis       HPI:  72yo 53kg f w pmh mci/dementia (alzheimer), anx/dep/insomnia, gerd/lprd, osteoporosis, mronj, fibromyalgia, ibs-c c/b diverticulosis + hemorrhoids, p/w facial swelling a/w orofacial pain; She reports that over the last month she has had increased swelling to the lower jaw area with pain which acutely progressed 2 days ago.  She was prescribed Biaxin which she has been taking for approximately 1 week without relief.  Went to outpatient ENT, recommended patient  to go the er. so patient presents to [facility] er for further evaluation. in er, found to have necrotic mass in the floor of mouth; admit to medicine for further mgmt.  (10 Fermin 2024 23:36)    Hospital Course:  Imaging significant for "peripherally enhancing necrotic mass in the right anterior floor the mouth, likely reflecting necrotic neoplasm, but superimposed infection and abscess formation is not excluded." Pt evaluated by OMFS. S/p I&D - subperosteal dissection along lingual aspect of anterior mandible, washed out with saline. Treated with antibiotics. Will complete PO abx on 1/20. Stable for discharge with outpatient OMFS follow up in 2 weeks for definitive tx with debridement of necrotic bone at mandible after resolution of acute infection.    Important Medication Changes and Reason:  Augmentin 875 BID until 1/20  Oxycodone 5 mg for moderate pain or 10 mg for severe pain every 4 hours as needed  Ibuprofen 800 mg TID for 5 days    Active or Pending Issues Requiring Follow-up:  F/u with oral surgeon Dr. Etienne Copoer in 2 weeks for debridement of necrotic bone at mandible    Advanced Directives:   [x ] Full code  [ ] DNR  [ ] Hospice    Discharge Diagnoses:  Infected mandibular mass with necrosis       HPI:  74yo 53kg f w pmh mci/dementia (alzheimer), anx/dep/insomnia, gerd/lprd, osteoporosis, mronj, fibromyalgia, ibs-c c/b diverticulosis + hemorrhoids, p/w facial swelling a/w orofacial pain; She reports that over the last month she has had increased swelling to the lower jaw area with pain which acutely progressed 2 days ago.  She was prescribed Biaxin which she has been taking for approximately 1 week without relief.  Went to outpatient ENT, recommended patient  to go the er. so patient presents to [facility] er for further evaluation. in er, found to have necrotic mass in the floor of mouth; admit to medicine for further mgmt.  (10 Fermin 2024 23:36)    Hospital Course:  Imaging significant for "peripherally enhancing necrotic mass in the right anterior floor the mouth, likely reflecting necrotic neoplasm, but superimposed infection and abscess formation is not excluded." Pt evaluated by OMFS. S/p I&D - subperosteal dissection along lingual aspect of anterior mandible, washed out with saline. Treated with antibiotics. Will complete PO abx on 1/20. Stable for discharge with outpatient OMFS follow up in 2 weeks for definitive tx with debridement of necrotic bone at mandible after resolution of acute infection.    Important Medication Changes and Reason:  Augmentin 875 BID until 1/20  Oxycodone 5 mg for moderate pain or 10 mg for severe pain every 4 hours as needed  Ibuprofen 800 mg TID for 5 days    Active or Pending Issues Requiring Follow-up:  F/u with oral surgeon Dr. Etienne Cooper in 2 weeks for debridement of necrotic bone at mandible    Advanced Directives:   [x ] Full code  [ ] DNR  [ ] Hospice    Discharge Diagnoses:  Infected mandibular mass with necrosis

## 2024-01-14 NOTE — PROGRESS NOTE ADULT - PROBLEM SELECTOR PLAN 2
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  imaging shows "bony erosive and sclerotic changes involving the lingual cortex of the left mandibular body, likely due to provided history of osteonecrosis."  pain control  pt will need to f/u with her oral surgeon, Dr. Etienne Fuentes, in two weeks. 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  imaging shows "bony erosive and sclerotic changes involving the lingual cortex of the left mandibular body, likely due to provided history of osteonecrosis."  pain control  pt will need to f/u with her oral surgeon, Dr. Etienne Fuentes, in two weeks. ([address] [place] [address] TEL: [phone])

## 2024-01-14 NOTE — DISCHARGE NOTE PROVIDER - CARE PROVIDER_API CALL
Etienne Cooper  Oral/Maxillofacial Surgery  [address] [place] [address], [address]  [place] [address]  Phone: [phone]  Fax: [phone]  Established Patient  Follow Up Time:    Etienne Cooper  Oral/Maxillofacial Surgery  [address] [place] [address], [address]  [place] [address]  Phone: [phone]  Fax: [phone]  Established Patient  Follow Up Time:    Etienne Cooper  Oral/Maxillofacial Surgery  [address] [place] [address], [address]  [place] [address]  Phone: [phone]  Fax: [phone]  Established Patient  Follow Up Time:

## 2024-01-14 NOTE — DISCHARGE NOTE NURSING/CASE MANAGEMENT/SOCIAL WORK - NSDCPEFALRISK_GEN_ALL_CORE
For information on Fall & Injury Prevention, visit: [email or web address] OR  [email or web address]-injury OR  https://www.cdc.gov/steadi/patient.html For information on Fall & Injury Prevention, visit: [email or web address] OR  https://www.[email or web address]/[email or web address]-prevention-tips-to-avoid-injury OR  https://www.cdc.gov/steadi/patient.html

## 2024-01-14 NOTE — DISCHARGE NOTE PROVIDER - NSDCMRMEDTOKEN_GEN_ALL_CORE_FT
acetaminophen 325 mg oral tablet: 2 tab(s) orally every 6 hours As needed Temp greater or equal to 38C (100.4F), Mild Pain (1 - 3)  amoxicillin-clavulanate 875 mg-125 mg oral tablet: 1 tab(s) orally 2 times a day  gabapentin 100 mg oral tablet: 1 tab(s) orally 3 times a day  ibuprofen 800 mg oral tablet: 1 tab(s) orally every 8 hours  Lipitor 10 mg oral tablet: 1 tab(s) orally once a day (at bedtime)  memantine 10 mg oral tablet: 1 tab(s) orally 2 times a day  Narcan 4 mg/0.1 mL nasal spray: 4 milligram(s) intranasally once a day as needed for  opioid overdose  omeprazole 20 mg oral delayed release capsule: 1 cap(s) orally once a day  oxyCODONE 5 mg oral tablet: 2 tab(s) orally every 4 hours as needed for  severe pain take 1 tab every 4 hours for moderate pain MDD: 12

## 2024-01-14 NOTE — DISCHARGE NOTE PROVIDER - NSDCFUADDAPPT_GEN_ALL_CORE_FT
APPTS ARE READY TO BE MADE: [ x] YES    Best Family or Patient Contact (if needed):    Additional Information about above appointments (if needed):    1: Dr. Cooper (in 2 weeks)  2:   3:     Other comments or requests:    APPTS ARE READY TO BE MADE: [ x] YES    Best Family or Patient Contact (if needed):    Additional Information about above appointments (if needed):    1: Dr. Cooper (in 2 weeks)  2:   3:     Other comments or requests:   Patient was provided with follow up request details and was advised to call to schedule follow up within specified time frame. No scheduling assistance is needed at this time.   APPTS ARE READY TO BE MADE: [ x] YES    Best Family or Patient Contact (if needed):    Additional Information about above appointments (if needed):    1: Dr. Copoer (in 2 weeks)  2:   3:     Other comments or requests:   Patient was provided with follow up request details and was advised to call to schedule follow up within specified time frame. No scheduling assistance is needed at this time.

## 2024-01-15 VITALS
HEART RATE: 80 BPM | OXYGEN SATURATION: 99 % | TEMPERATURE: 98 F | DIASTOLIC BLOOD PRESSURE: 63 MMHG | RESPIRATION RATE: 18 BRPM | SYSTOLIC BLOOD PRESSURE: 114 MMHG

## 2024-01-15 PROCEDURE — 82746 ASSAY OF FOLIC ACID SERUM: CPT

## 2024-01-15 PROCEDURE — G1004: CPT

## 2024-01-15 PROCEDURE — 80061 LIPID PANEL: CPT

## 2024-01-15 PROCEDURE — 80053 COMPREHEN METABOLIC PANEL: CPT

## 2024-01-15 PROCEDURE — 87641 MR-STAPH DNA AMP PROBE: CPT

## 2024-01-15 PROCEDURE — 85014 HEMATOCRIT: CPT

## 2024-01-15 PROCEDURE — 82607 VITAMIN B-12: CPT

## 2024-01-15 PROCEDURE — 84132 ASSAY OF SERUM POTASSIUM: CPT

## 2024-01-15 PROCEDURE — 84443 ASSAY THYROID STIM HORMONE: CPT

## 2024-01-15 PROCEDURE — 80048 BASIC METABOLIC PNL TOTAL CA: CPT

## 2024-01-15 PROCEDURE — 87205 SMEAR GRAM STAIN: CPT

## 2024-01-15 PROCEDURE — 87077 CULTURE AEROBIC IDENTIFY: CPT

## 2024-01-15 PROCEDURE — 85025 COMPLETE CBC W/AUTO DIFF WBC: CPT

## 2024-01-15 PROCEDURE — 82330 ASSAY OF CALCIUM: CPT

## 2024-01-15 PROCEDURE — 85610 PROTHROMBIN TIME: CPT

## 2024-01-15 PROCEDURE — 84295 ASSAY OF SERUM SODIUM: CPT

## 2024-01-15 PROCEDURE — 87070 CULTURE OTHR SPECIMN AEROBIC: CPT

## 2024-01-15 PROCEDURE — 82947 ASSAY GLUCOSE BLOOD QUANT: CPT

## 2024-01-15 PROCEDURE — 85027 COMPLETE CBC AUTOMATED: CPT

## 2024-01-15 PROCEDURE — 96375 TX/PRO/DX INJ NEW DRUG ADDON: CPT

## 2024-01-15 PROCEDURE — 86803 HEPATITIS C AB TEST: CPT

## 2024-01-15 PROCEDURE — 82803 BLOOD GASES ANY COMBINATION: CPT

## 2024-01-15 PROCEDURE — 82435 ASSAY OF BLOOD CHLORIDE: CPT

## 2024-01-15 PROCEDURE — 83036 HEMOGLOBIN GLYCOSYLATED A1C: CPT

## 2024-01-15 PROCEDURE — 85730 THROMBOPLASTIN TIME PARTIAL: CPT

## 2024-01-15 PROCEDURE — 96374 THER/PROPH/DIAG INJ IV PUSH: CPT

## 2024-01-15 PROCEDURE — 93005 ELECTROCARDIOGRAM TRACING: CPT

## 2024-01-15 PROCEDURE — 99232 SBSQ HOSP IP/OBS MODERATE 35: CPT

## 2024-01-15 PROCEDURE — 36415 COLL VENOUS BLD VENIPUNCTURE: CPT

## 2024-01-15 PROCEDURE — 92610 EVALUATE SWALLOWING FUNCTION: CPT

## 2024-01-15 PROCEDURE — 96376 TX/PRO/DX INJ SAME DRUG ADON: CPT

## 2024-01-15 PROCEDURE — 86780 TREPONEMA PALLIDUM: CPT

## 2024-01-15 PROCEDURE — 99285 EMERGENCY DEPT VISIT HI MDM: CPT | Mod: 25

## 2024-01-15 PROCEDURE — 85018 HEMOGLOBIN: CPT

## 2024-01-15 PROCEDURE — 71045 X-RAY EXAM CHEST 1 VIEW: CPT

## 2024-01-15 PROCEDURE — 83735 ASSAY OF MAGNESIUM: CPT

## 2024-01-15 PROCEDURE — 87040 BLOOD CULTURE FOR BACTERIA: CPT

## 2024-01-15 PROCEDURE — 70491 CT SOFT TISSUE NECK W/DYE: CPT | Mod: MG

## 2024-01-15 PROCEDURE — 87640 STAPH A DNA AMP PROBE: CPT

## 2024-01-15 PROCEDURE — 84100 ASSAY OF PHOSPHORUS: CPT

## 2024-01-15 PROCEDURE — 83605 ASSAY OF LACTIC ACID: CPT

## 2024-01-15 RX ADMIN — CHLORHEXIDINE GLUCONATE 15 MILLILITER(S): 213 SOLUTION TOPICAL at 05:17

## 2024-01-15 RX ADMIN — ENOXAPARIN SODIUM 40 MILLIGRAM(S): 100 INJECTION SUBCUTANEOUS at 05:17

## 2024-01-15 RX ADMIN — OXYCODONE HYDROCHLORIDE 5 MILLIGRAM(S): 5 TABLET ORAL at 03:18

## 2024-01-15 RX ADMIN — Medication 650 MILLIGRAM(S): at 09:30

## 2024-01-15 RX ADMIN — MEMANTINE HYDROCHLORIDE 10 MILLIGRAM(S): 10 TABLET ORAL at 05:16

## 2024-01-15 RX ADMIN — OXYCODONE HYDROCHLORIDE 5 MILLIGRAM(S): 5 TABLET ORAL at 03:48

## 2024-01-15 RX ADMIN — PIPERACILLIN AND TAZOBACTAM 25 GRAM(S): 4; .5 INJECTION, POWDER, LYOPHILIZED, FOR SOLUTION INTRAVENOUS at 05:17

## 2024-01-15 RX ADMIN — Medication 650 MILLIGRAM(S): at 09:28

## 2024-01-15 RX ADMIN — GABAPENTIN 100 MILLIGRAM(S): 400 CAPSULE ORAL at 05:18

## 2024-01-15 RX ADMIN — PANTOPRAZOLE SODIUM 40 MILLIGRAM(S): 20 TABLET, DELAYED RELEASE ORAL at 05:17

## 2024-01-15 RX ADMIN — Medication 800 MILLIGRAM(S): at 12:39

## 2024-01-15 RX ADMIN — Medication 800 MILLIGRAM(S): at 05:17

## 2024-01-15 NOTE — PROGRESS NOTE ADULT - PROBLEM SELECTOR PLAN 2
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  imaging shows "bony erosive and sclerotic changes involving the lingual cortex of the left mandibular body, likely due to provided history of osteonecrosis."  pain control  pt will need to f/u with her oral surgeon, Dr. Etienne Fuentes, in two weeks. 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  imaging shows "bony erosive and sclerotic changes involving the lingual cortex of the left mandibular body, likely due to provided history of osteonecrosis."  pain control  pt will need to f/u with her oral surgeon, Dr. Etienne Fuentes, in two weeks. 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  imaging shows "bony erosive and sclerotic changes involving the lingual cortex of the left mandibular body, likely due to provided history of osteonecrosis."  pain control  pt will need to f/u with her oral surgeon, Dr. Etienne uFentes, in two weeks. ([address] [place] [address] TEL: [phone])

## 2024-01-15 NOTE — PROGRESS NOTE ADULT - ASSESSMENT
72yo 53kg f w pmh mci/dementia (alzheimer), anx/dep/insomnia, gerd/lprd, osteoporosis, mronj, fibromyalgia, ibs-c c/b diverticulosis + hemorrhoids, p/w facial swelling a/w orofacial pain; in er, found to have necrotic mass in the floor of mouth; admit to medicine for further mgmt.  74yo 53kg f w pmh mci/dementia (alzheimer), anx/dep/insomnia, gerd/lprd, osteoporosis, mronj, fibromyalgia, ibs-c c/b diverticulosis + hemorrhoids, p/w facial swelling a/w orofacial pain; in er, found to have necrotic mass in the floor of mouth; admit to medicine for further mgmt.

## 2024-01-15 NOTE — PROGRESS NOTE ADULT - PROVIDER SPECIALTY LIST ADULT
Cardiology
Hospitalist
OMFS
OMFS
Hospitalist
Cardiology
Cardiology
Hospitalist
OMFS
Cardiology
OMFS

## 2024-01-15 NOTE — PROGRESS NOTE ADULT - REASON FOR ADMISSION
facial swelling

## 2024-01-15 NOTE — PROGRESS NOTE ADULT - PROBLEM SELECTOR PROBLEM 2
Osteonecrosis of jaw

## 2024-01-15 NOTE — PROGRESS NOTE ADULT - NSPROGADDITIONALINFOA_GEN_ALL_CORE
stable for discharge today  Discharge time spent: 34 min
dispo pending culture data
hypokalemia - repleted
stable for discharge  Discharge time spent: 34 min

## 2024-01-15 NOTE — PROGRESS NOTE ADULT - SUBJECTIVE AND OBJECTIVE BOX
C A R D I O L O G Y  **********************************     DATE OF SERVICE: 01-15-24    Patient reports mouth pain improving. denies chest pain or shortness of breath.   Review of symptoms otherwise negative.    MEDICATIONS:  acetaminophen     Tablet .. 650 milliGRAM(s) Oral every 6 hours PRN  aluminum hydroxide/magnesium hydroxide/simethicone Suspension 30 milliLiter(s) Oral every 4 hours PRN  bisacodyl 5 milliGRAM(s) Oral daily PRN  chlorhexidine 0.12% Liquid 15 milliLiter(s) Swish and Spit two times a day  dextrose 5% + sodium chloride 0.45%. 1000 milliLiter(s) IV Continuous <Continuous>  enoxaparin Injectable 40 milliGRAM(s) SubCutaneous every 24 hours  gabapentin 100 milliGRAM(s) Oral three times a day  ibuprofen  Tablet. 800 milliGRAM(s) Oral every 8 hours  melatonin 3 milliGRAM(s) Oral at bedtime PRN  memantine 10 milliGRAM(s) Oral two times a day  naloxone Injectable 0.4 milliGRAM(s) IV Push once  ondansetron Injectable 4 milliGRAM(s) IV Push every 8 hours PRN  oxyCODONE    IR 5 milliGRAM(s) Oral every 4 hours PRN  oxyCODONE    IR 10 milliGRAM(s) Oral every 4 hours PRN  pantoprazole    Tablet 40 milliGRAM(s) Oral before breakfast  piperacillin/tazobactam IVPB.. 3.375 Gram(s) IV Intermittent every 8 hours  polyethylene glycol 3350 17 Gram(s) Oral daily  senna 2 Tablet(s) Oral at bedtime      LABS:                        11.8   6.51  )-----------( 168      ( 14 Jan 2024 07:35 )             36.7       Hemoglobin: 11.8 g/dL (01-14 @ 07:35)  Hemoglobin: 12.7 g/dL (01-12 @ 07:24)  Hemoglobin: 11.6 g/dL (01-11 @ 05:55)  Hemoglobin: 11.7 g/dL (01-10 @ 16:14)      01-14    139  |  103  |  6<L>  ----------------------------<  85  4.4   |  26  |  0.62    Ca    9.0      14 Jan 2024 07:40  Phos  4.1     01-14  Mg     1.6     01-14      Creatinine Trend: 0.62<--, 0.56<--, 0.65<--, 0.75<--    COAGS:           PHYSICAL EXAM:  T(C): 36.9 (01-15-24 @ 09:59), Max: 36.9 (01-15-24 @ 04:47)  HR: 80 (01-15-24 @ 09:59) (69 - 80)  BP: 114/63 (01-15-24 @ 09:59) (100/64 - 142/84)  RR: 18 (01-15-24 @ 09:59) (18 - 18)  SpO2: 99% (01-15-24 @ 09:59) (97% - 100%)  Wt(kg): --    I&O's Summary    14 Jan 2024 07:01  -  15 Fermin 2024 07:00  --------------------------------------------------------  IN: 980 mL / OUT: 0 mL / NET: 980 mL    15 Fermin 2024 07:01  -  15 Fermin 2024 12:17  --------------------------------------------------------  IN: 240 mL / OUT: 0 mL / NET: 240 mL          Gen: NAD  HEENT:  (-)icterus (-)pallor  CV: N S1 S2 1/6 MANUEL (+)2 Pulses B/l  Resp:  Clear to auscultation B/L, normal effort  GI: (+) BS Soft, NT, ND  Lymph:  (-)Edema, (-)obvious lymphadenopathy  Skin: Warm to touch, Normal turgor  Psych: Appropriate mood and affect      TELEMETRY: None	      ASSESSMENT/PLAN: Patient is a 74 y/o female well known to our office with PMH of mci/dementia (alzheimer), anx/dep/insomnia, gerd, osteoporosis, fibromyalgia, ibs-c c/b diverticulosis + hemorrhoids who is admitted with facial swelling found to have necrotic mass in the right anterior floor the mouth.    #Necrotic Mass in mouth  - Workup per ENT/IR/OMFS - s/p I&D  - Based on patient's RCRI score of 0, would consider her low cardiac risk for any planned procedures/biopsies. Patient is optimized from CV to proceed. No evidence of clinical HF or unstable cardiac syndromes. She had office echo with normal LV/RV function in 1/2023 and normal exercise NST with no ischemia in 7/2021.    #Palpitations  - EKG with SR and PACs  - No arrhythmia on prior office holter monitor. Recommend repeat outpatient holter monitor    - No further inpatient cardiac w/u planned  - D/C planning per primary team  - Patient to f/u with Dr. Duffy on 3/12 at 3:30 PM    Checo Campo PA-C  Pager: [phone]      C A R D I O L O G Y  **********************************     DATE OF SERVICE: 01-15-24    Patient reports mouth pain improving. denies chest pain or shortness of breath.   Review of symptoms otherwise negative.    MEDICATIONS:  acetaminophen     Tablet .. 650 milliGRAM(s) Oral every 6 hours PRN  aluminum hydroxide/magnesium hydroxide/simethicone Suspension 30 milliLiter(s) Oral every 4 hours PRN  bisacodyl 5 milliGRAM(s) Oral daily PRN  chlorhexidine 0.12% Liquid 15 milliLiter(s) Swish and Spit two times a day  dextrose 5% + sodium chloride 0.45%. 1000 milliLiter(s) IV Continuous <Continuous>  enoxaparin Injectable 40 milliGRAM(s) SubCutaneous every 24 hours  gabapentin 100 milliGRAM(s) Oral three times a day  ibuprofen  Tablet. 800 milliGRAM(s) Oral every 8 hours  melatonin 3 milliGRAM(s) Oral at bedtime PRN  memantine 10 milliGRAM(s) Oral two times a day  naloxone Injectable 0.4 milliGRAM(s) IV Push once  ondansetron Injectable 4 milliGRAM(s) IV Push every 8 hours PRN  oxyCODONE    IR 5 milliGRAM(s) Oral every 4 hours PRN  oxyCODONE    IR 10 milliGRAM(s) Oral every 4 hours PRN  pantoprazole    Tablet 40 milliGRAM(s) Oral before breakfast  piperacillin/tazobactam IVPB.. 3.375 Gram(s) IV Intermittent every 8 hours  polyethylene glycol 3350 17 Gram(s) Oral daily  senna 2 Tablet(s) Oral at bedtime      LABS:                        11.8   6.51  )-----------( 168      ( 14 Jan 2024 07:35 )             36.7       Hemoglobin: 11.8 g/dL (01-14 @ 07:35)  Hemoglobin: 12.7 g/dL (01-12 @ 07:24)  Hemoglobin: 11.6 g/dL (01-11 @ 05:55)  Hemoglobin: 11.7 g/dL (01-10 @ 16:14)      01-14    139  |  103  |  6<L>  ----------------------------<  85  4.4   |  26  |  0.62    Ca    9.0      14 Jan 2024 07:40  Phos  4.1     01-14  Mg     1.6     01-14      Creatinine Trend: 0.62<--, 0.56<--, 0.65<--, 0.75<--    COAGS:           PHYSICAL EXAM:  T(C): 36.9 (01-15-24 @ 09:59), Max: 36.9 (01-15-24 @ 04:47)  HR: 80 (01-15-24 @ 09:59) (69 - 80)  BP: 114/63 (01-15-24 @ 09:59) (100/64 - 142/84)  RR: 18 (01-15-24 @ 09:59) (18 - 18)  SpO2: 99% (01-15-24 @ 09:59) (97% - 100%)  Wt(kg): --    I&O's Summary    14 Jan 2024 07:01  -  15 Fermin 2024 07:00  --------------------------------------------------------  IN: 980 mL / OUT: 0 mL / NET: 980 mL    15 Fermin 2024 07:01  -  15 Fermin 2024 12:17  --------------------------------------------------------  IN: 240 mL / OUT: 0 mL / NET: 240 mL          Gen: NAD  HEENT:  (-)icterus (-)pallor  CV: N S1 S2 1/6 MANUEL (+)2 Pulses B/l  Resp:  Clear to auscultation B/L, normal effort  GI: (+) BS Soft, NT, ND  Lymph:  (-)Edema, (-)obvious lymphadenopathy  Skin: Warm to touch, Normal turgor  Psych: Appropriate mood and affect      TELEMETRY: None	      ASSESSMENT/PLAN: Patient is a 74 y/o female well known to our office with PMH of mci/dementia (alzheimer), anx/dep/insomnia, gerd, osteoporosis, fibromyalgia, ibs-c c/b diverticulosis + hemorrhoids who is admitted with facial swelling found to have necrotic mass in the right anterior floor the mouth.    #Necrotic Mass in mouth  - Workup per ENT/IR/OMFS - s/p I&D  - Based on patient's RCRI score of 0, would consider her low cardiac risk for any planned procedures/biopsies. Patient is optimized from CV to proceed. No evidence of clinical HF or unstable cardiac syndromes. She had office echo with normal LV/RV function in 1/2023 and normal exercise NST with no ischemia in 7/2021.    #Palpitations  - EKG with SR and PACs  - No arrhythmia on prior office holter monitor. Recommend repeat outpatient holter monitor    - No further inpatient cardiac w/u planned  - D/C planning per primary team  - Patient to f/u with Dr. Duffy on 3/12 at 3:30 PM    Checo Campo PA-C  Pager: [phone]      C A R D I O L O G Y  **********************************     DATE OF SERVICE: 01-15-24    Patient reports mouth pain improving. denies chest pain or shortness of breath.   Review of symptoms otherwise negative.    MEDICATIONS:  acetaminophen     Tablet .. 650 milliGRAM(s) Oral every 6 hours PRN  aluminum hydroxide/magnesium hydroxide/simethicone Suspension 30 milliLiter(s) Oral every 4 hours PRN  bisacodyl 5 milliGRAM(s) Oral daily PRN  chlorhexidine 0.12% Liquid 15 milliLiter(s) Swish and Spit two times a day  dextrose 5% + sodium chloride 0.45%. 1000 milliLiter(s) IV Continuous <Continuous>  enoxaparin Injectable 40 milliGRAM(s) SubCutaneous every 24 hours  gabapentin 100 milliGRAM(s) Oral three times a day  ibuprofen  Tablet. 800 milliGRAM(s) Oral every 8 hours  melatonin 3 milliGRAM(s) Oral at bedtime PRN  memantine 10 milliGRAM(s) Oral two times a day  naloxone Injectable 0.4 milliGRAM(s) IV Push once  ondansetron Injectable 4 milliGRAM(s) IV Push every 8 hours PRN  oxyCODONE    IR 5 milliGRAM(s) Oral every 4 hours PRN  oxyCODONE    IR 10 milliGRAM(s) Oral every 4 hours PRN  pantoprazole    Tablet 40 milliGRAM(s) Oral before breakfast  piperacillin/tazobactam IVPB.. 3.375 Gram(s) IV Intermittent every 8 hours  polyethylene glycol 3350 17 Gram(s) Oral daily  senna 2 Tablet(s) Oral at bedtime      LABS:                        11.8   6.51  )-----------( 168      ( 14 Jan 2024 07:35 )             36.7       Hemoglobin: 11.8 g/dL (01-14 @ 07:35)  Hemoglobin: 12.7 g/dL (01-12 @ 07:24)  Hemoglobin: 11.6 g/dL (01-11 @ 05:55)  Hemoglobin: 11.7 g/dL (01-10 @ 16:14)      01-14    139  |  103  |  6<L>  ----------------------------<  85  4.4   |  26  |  0.62    Ca    9.0      14 Jan 2024 07:40  Phos  4.1     01-14  Mg     1.6     01-14      Creatinine Trend: 0.62<--, 0.56<--, 0.65<--, 0.75<--    COAGS:           PHYSICAL EXAM:  T(C): 36.9 (01-15-24 @ 09:59), Max: 36.9 (01-15-24 @ 04:47)  HR: 80 (01-15-24 @ 09:59) (69 - 80)  BP: 114/63 (01-15-24 @ 09:59) (100/64 - 142/84)  RR: 18 (01-15-24 @ 09:59) (18 - 18)  SpO2: 99% (01-15-24 @ 09:59) (97% - 100%)  Wt(kg): --    I&O's Summary    14 Jan 2024 07:01  -  15 Fermin 2024 07:00  --------------------------------------------------------  IN: 980 mL / OUT: 0 mL / NET: 980 mL    15 Fermin 2024 07:01  -  15 Fermin 2024 12:17  --------------------------------------------------------  IN: 240 mL / OUT: 0 mL / NET: 240 mL          Gen: NAD  HEENT:  (-)icterus (-)pallor  CV: N S1 S2 1/6 MANUEL (+)2 Pulses B/l  Resp:  Clear to auscultation B/L, normal effort  GI: (+) BS Soft, NT, ND  Lymph:  (-)Edema, (-)obvious lymphadenopathy  Skin: Warm to touch, Normal turgor  Psych: Appropriate mood and affect      TELEMETRY: None	      ASSESSMENT/PLAN: Patient is a 72 y/o female well known to our office with PMH of mci/dementia (alzheimer), anx/dep/insomnia, gerd, osteoporosis, fibromyalgia, ibs-c c/b diverticulosis + hemorrhoids who is admitted with facial swelling found to have necrotic mass in the right anterior floor the mouth.    #Necrotic Mass in mouth  - Workup per ENT/IR/OMFS - s/p I&D  - Based on patient's RCRI score of 0, would consider her low cardiac risk for any planned procedures/biopsies. Patient is optimized from CV to proceed. No evidence of clinical HF or unstable cardiac syndromes. She had office echo with normal LV/RV function in 1/2023 and normal exercise NST with no ischemia in 7/2021.    #Palpitations  - EKG with SR and PACs  - No arrhythmia on prior office holter monitor. Recommend repeat outpatient holter monitor    - No further inpatient cardiac w/u planned  - D/C planning per primary team  - Patient to f/u with Dr. Duffy on 3/12 at 3:30 PM    Checo Campo PA-C  Pager: [phone]

## 2024-01-15 NOTE — PROGRESS NOTE ADULT - SUBJECTIVE AND OBJECTIVE BOX
Andre Reyes, M.D.  Pager: [phone]  Office: [phone]    Patient is a 73y old  Female who presents with a chief complaint of facial swelling (14 Jan 2024 14:07)          SUBJECTIVE / OVERNIGHT EVENTS:    No acute overnight events.    ROS: ( - ) Fever, ( - )Chills,  ( - )Nausea/Vomiting, ( - ) Cough, ( - )Shortness of breath, ( - )Chest Pain    MEDICATIONS  (STANDING):  chlorhexidine 0.12% Liquid 15 milliLiter(s) Swish and Spit two times a day  dextrose 5% + sodium chloride 0.45%. 1000 milliLiter(s) (80 mL/Hr) IV Continuous <Continuous>  enoxaparin Injectable 40 milliGRAM(s) SubCutaneous every 24 hours  gabapentin 100 milliGRAM(s) Oral three times a day  ibuprofen  Tablet. 800 milliGRAM(s) Oral every 8 hours  memantine 10 milliGRAM(s) Oral two times a day  naloxone Injectable 0.4 milliGRAM(s) IV Push once  pantoprazole    Tablet 40 milliGRAM(s) Oral before breakfast  piperacillin/tazobactam IVPB.. 3.375 Gram(s) IV Intermittent every 8 hours  polyethylene glycol 3350 17 Gram(s) Oral daily  senna 2 Tablet(s) Oral at bedtime    MEDICATIONS  (PRN):  acetaminophen     Tablet .. 650 milliGRAM(s) Oral every 6 hours PRN Temp greater or equal to 38C (100.4F), Mild Pain (1 - 3)  aluminum hydroxide/magnesium hydroxide/simethicone Suspension 30 milliLiter(s) Oral every 4 hours PRN Dyspepsia  bisacodyl 5 milliGRAM(s) Oral daily PRN Constipation  melatonin 3 milliGRAM(s) Oral at bedtime PRN Insomnia  ondansetron Injectable 4 milliGRAM(s) IV Push every 8 hours PRN Nausea and/or Vomiting  oxyCODONE    IR 5 milliGRAM(s) Oral every 4 hours PRN Moderate Pain (4 - 6)  oxyCODONE    IR 10 milliGRAM(s) Oral every 4 hours PRN Severe Pain (7 - 10)          T(C): 36.9 (01-15 @ 04:47), Max: 36.9 (01-14 @ 09:13)   HR: 73   BP: 142/84   RR: 18   SpO2: 100%    PHYSICAL EXAM:    CONSTITUTIONAL: NAD, well-developed, well-groomed  EYES: PERRLA; conjunctiva and sclera clear  ENMT: Moist oral mucosa, no pharyngeal injection or exudates; normal dentition  NECK: Supple, no palpable masses; no thyromegaly  RESPIRATORY: Normal respiratory effort; lungs are clear to auscultation bilaterally  CARDIOVASCULAR: Regular rate and rhythm, normal S1 and S2, no murmur/rub/gallop; No lower extremity edema; Peripheral pulses are 2+ bilaterally  ABDOMEN: Nontender to palpation, normoactive bowel sounds, no rebound/guarding; No hepatosplenomegaly  MUSCULOSKELETAL:  no clubbing or cyanosis of digits; no joint swelling or tenderness to palpation  PSYCH: A+O to person, place, and time; affect appropriate  NEUROLOGY: CN 2-12 are intact and symmetric; no gross sensory deficits   SKIN: No rashes; no palpable lesions      LABS:                        11.8   6.51  )-----------( 168      ( 14 Jan 2024 07:35 )             36.7      01-14    139  |  103  |  6<L>  ----------------------------<  85  4.4   |  26  |  0.62    Ca    9.0      14 Jan 2024 07:40  Phos  4.1     01-14  Mg     1.6     01-14         CAPILLARY BLOOD GLUCOSE          RADIOLOGY & ADDITIONAL TESTS:    Imaging Personally Reviewed:  Consultant(s) Notes Reviewed:    Care Discussed with Consultants/Other Providers:   Andre Reyes, M.D.  Pager: [phone]  Office: [phone]    Patient is a 73y old  Female who presents with a chief complaint of facial swelling (14 Jan 2024 14:07)          SUBJECTIVE / OVERNIGHT EVENTS:    No acute overnight events.    ROS: ( - ) Fever, ( - )Chills,  ( - )Nausea/Vomiting, ( - ) Cough, ( - )Shortness of breath, ( - )Chest Pain    MEDICATIONS  (STANDING):  chlorhexidine 0.12% Liquid 15 milliLiter(s) Swish and Spit two times a day  dextrose 5% + sodium chloride 0.45%. 1000 milliLiter(s) (80 mL/Hr) IV Continuous <Continuous>  enoxaparin Injectable 40 milliGRAM(s) SubCutaneous every 24 hours  gabapentin 100 milliGRAM(s) Oral three times a day  ibuprofen  Tablet. 800 milliGRAM(s) Oral every 8 hours  memantine 10 milliGRAM(s) Oral two times a day  naloxone Injectable 0.4 milliGRAM(s) IV Push once  pantoprazole    Tablet 40 milliGRAM(s) Oral before breakfast  piperacillin/tazobactam IVPB.. 3.375 Gram(s) IV Intermittent every 8 hours  polyethylene glycol 3350 17 Gram(s) Oral daily  senna 2 Tablet(s) Oral at bedtime    MEDICATIONS  (PRN):  acetaminophen     Tablet .. 650 milliGRAM(s) Oral every 6 hours PRN Temp greater or equal to 38C (100.4F), Mild Pain (1 - 3)  aluminum hydroxide/magnesium hydroxide/simethicone Suspension 30 milliLiter(s) Oral every 4 hours PRN Dyspepsia  bisacodyl 5 milliGRAM(s) Oral daily PRN Constipation  melatonin 3 milliGRAM(s) Oral at bedtime PRN Insomnia  ondansetron Injectable 4 milliGRAM(s) IV Push every 8 hours PRN Nausea and/or Vomiting  oxyCODONE    IR 5 milliGRAM(s) Oral every 4 hours PRN Moderate Pain (4 - 6)  oxyCODONE    IR 10 milliGRAM(s) Oral every 4 hours PRN Severe Pain (7 - 10)          T(C): 36.9 (01-15 @ 04:47), Max: 36.9 (01-14 @ 09:13)   HR: 73   BP: 142/84   RR: 18   SpO2: 100%    PHYSICAL EXAM:    CONSTITUTIONAL: NAD, well-developed, well-groomed  EYES: PERRLA; conjunctiva and sclera clear  ENMT: Moist oral mucosa, no pharyngeal injection or exudates; normal dentition  NECK: Supple, no palpable masses; no thyromegaly  RESPIRATORY: Normal respiratory effort; lungs are clear to auscultation bilaterally  CARDIOVASCULAR: Regular rate and rhythm, normal S1 and S2, no murmur/rub/gallop; No lower extremity edema; Peripheral pulses are 2+ bilaterally  ABDOMEN: Nontender to palpation, normoactive bowel sounds, no rebound/guarding; No hepatosplenomegaly  MUSCULOSKELETAL:  no clubbing or cyanosis of digits; no joint swelling or tenderness to palpation  PSYCH: A+O to person, place, and time; affect appropriate  NEUROLOGY: CN 2-12 are intact and symmetric; no gross sensory deficits   SKIN: No rashes; no palpable lesions      LABS:                        11.8   6.51  )-----------( 168      ( 14 Jan 2024 07:35 )             36.7      01-14    139  |  103  |  6<L>  ----------------------------<  85  4.4   |  26  |  0.62    Ca    9.0      14 Jan 2024 07:40  Phos  4.1     01-14  Mg     1.6     01-14         CAPILLARY BLOOD GLUCOSE          RADIOLOGY & ADDITIONAL TESTS:    Imaging Personally Reviewed:  Consultant(s) Notes Reviewed:    Care Discussed with Consultants/Other Providers:   Andre Reyes, M.D.  Pager: [phone]  Office: [phone]    Patient is a 73y old  Female who presents with a chief complaint of facial swelling (14 Jan 2024 14:07)          SUBJECTIVE / OVERNIGHT EVENTS:    No acute overnight events.    ROS: ( - ) Fever, ( - )Chills,  ( - )Nausea/Vomiting, ( - ) Cough, ( - )Shortness of breath, ( - )Chest Pain    MEDICATIONS  (STANDING):  chlorhexidine 0.12% Liquid 15 milliLiter(s) Swish and Spit two times a day  dextrose 5% + sodium chloride 0.45%. 1000 milliLiter(s) (80 mL/Hr) IV Continuous <Continuous>  enoxaparin Injectable 40 milliGRAM(s) SubCutaneous every 24 hours  gabapentin 100 milliGRAM(s) Oral three times a day  ibuprofen  Tablet. 800 milliGRAM(s) Oral every 8 hours  memantine 10 milliGRAM(s) Oral two times a day  naloxone Injectable 0.4 milliGRAM(s) IV Push once  pantoprazole    Tablet 40 milliGRAM(s) Oral before breakfast  piperacillin/tazobactam IVPB.. 3.375 Gram(s) IV Intermittent every 8 hours  polyethylene glycol 3350 17 Gram(s) Oral daily  senna 2 Tablet(s) Oral at bedtime    MEDICATIONS  (PRN):  acetaminophen     Tablet .. 650 milliGRAM(s) Oral every 6 hours PRN Temp greater or equal to 38C (100.4F), Mild Pain (1 - 3)  aluminum hydroxide/magnesium hydroxide/simethicone Suspension 30 milliLiter(s) Oral every 4 hours PRN Dyspepsia  bisacodyl 5 milliGRAM(s) Oral daily PRN Constipation  melatonin 3 milliGRAM(s) Oral at bedtime PRN Insomnia  ondansetron Injectable 4 milliGRAM(s) IV Push every 8 hours PRN Nausea and/or Vomiting  oxyCODONE    IR 5 milliGRAM(s) Oral every 4 hours PRN Moderate Pain (4 - 6)  oxyCODONE    IR 10 milliGRAM(s) Oral every 4 hours PRN Severe Pain (7 - 10)          T(C): 36.9 (01-15 @ 04:47), Max: 36.9 (01-14 @ 09:13)   HR: 73   BP: 142/84   RR: 18   SpO2: 100%    PHYSICAL EXAM:    CONSTITUTIONAL: NAD, well-developed, well-groomed  EYES: PERRLA; conjunctiva and sclera clear  ENMT: Moist oral mucosa, no pharyngeal injection or exudates; normal dentition  NECK: Supple, no palpable masses; no thyromegaly  RESPIRATORY: Normal respiratory effort; lungs are clear to auscultation bilaterally  CARDIOVASCULAR: Regular rate and rhythm, normal S1 and S2, no murmur/rub/gallop; No lower extremity edema; Peripheral pulses are 2+ bilaterally  ABDOMEN: Nontender to palpation, normoactive bowel sounds, no rebound/guarding; No hepatosplenomegaly  MUSCULOSKELETAL:  no clubbing or cyanosis of digits; no joint swelling or tenderness to palpation  PSYCH: A+O to person, place, and time; affect appropriate  NEUROLOGY: CN 2-12 are intact and symmetric; no gross sensory deficits   SKIN: No rashes; no palpable lesions      LABS:                        11.8   6.51  )-----------( 168      ( 14 Jan 2024 07:35 )             36.7      01-14    139  |  103  |  6<L>  ----------------------------<  85  4.4   |  26  |  0.62    Ca    9.0      14 Jan 2024 07:40  Phos  4.1     01-14  Mg     1.6     01-14         CAPILLARY BLOOD GLUCOSE          RADIOLOGY & ADDITIONAL TESTS:    Imaging Personally Reviewed:  Consultant(s) Notes Reviewed:    Care Discussed with Consultants/Other Providers:   Andre Reyes, M.D.  Pager: [phone]  Office: [phone]    Patient is a 73y old  Female who presents with a chief complaint of facial swelling (14 Jan 2024 14:07)          SUBJECTIVE / OVERNIGHT EVENTS:    No acute overnight events.  still with come pain but was able to eat a little more food.    ROS: ( - ) Fever, ( - )Chills,  ( - )Nausea/Vomiting, ( - ) Cough, ( - )Shortness of breath, ( - )Chest Pain    MEDICATIONS  (STANDING):  chlorhexidine 0.12% Liquid 15 milliLiter(s) Swish and Spit two times a day  dextrose 5% + sodium chloride 0.45%. 1000 milliLiter(s) (80 mL/Hr) IV Continuous <Continuous>  enoxaparin Injectable 40 milliGRAM(s) SubCutaneous every 24 hours  gabapentin 100 milliGRAM(s) Oral three times a day  ibuprofen  Tablet. 800 milliGRAM(s) Oral every 8 hours  memantine 10 milliGRAM(s) Oral two times a day  naloxone Injectable 0.4 milliGRAM(s) IV Push once  pantoprazole    Tablet 40 milliGRAM(s) Oral before breakfast  piperacillin/tazobactam IVPB.. 3.375 Gram(s) IV Intermittent every 8 hours  polyethylene glycol 3350 17 Gram(s) Oral daily  senna 2 Tablet(s) Oral at bedtime    MEDICATIONS  (PRN):  acetaminophen     Tablet .. 650 milliGRAM(s) Oral every 6 hours PRN Temp greater or equal to 38C (100.4F), Mild Pain (1 - 3)  aluminum hydroxide/magnesium hydroxide/simethicone Suspension 30 milliLiter(s) Oral every 4 hours PRN Dyspepsia  bisacodyl 5 milliGRAM(s) Oral daily PRN Constipation  melatonin 3 milliGRAM(s) Oral at bedtime PRN Insomnia  ondansetron Injectable 4 milliGRAM(s) IV Push every 8 hours PRN Nausea and/or Vomiting  oxyCODONE    IR 5 milliGRAM(s) Oral every 4 hours PRN Moderate Pain (4 - 6)  oxyCODONE    IR 10 milliGRAM(s) Oral every 4 hours PRN Severe Pain (7 - 10)          T(C): 36.9 (01-15 @ 04:47), Max: 36.9 (01-14 @ 09:13)   HR: 73   BP: 142/84   RR: 18   SpO2: 100%    PHYSICAL EXAM:    CONSTITUTIONAL: NAD, well-developed, well-groomed  EYES: PERRLA; conjunctiva and sclera clear  ENMT: Moist oral mucosa, no pharyngeal injection or exudates; normal dentition  NECK: Supple, no palpable masses; no thyromegaly  RESPIRATORY: Normal respiratory effort; lungs are clear to auscultation bilaterally  CARDIOVASCULAR: Regular rate and rhythm, normal S1 and S2, no murmur/rub/gallop; No lower extremity edema; Peripheral pulses are 2+ bilaterally  ABDOMEN: Nontender to palpation, normoactive bowel sounds, no rebound/guarding; No hepatosplenomegaly  MUSCULOSKELETAL:  no clubbing or cyanosis of digits; no joint swelling or tenderness to palpation  PSYCH: A+O to person, place, and time; affect appropriate  NEUROLOGY: CN 2-12 are intact and symmetric; no gross sensory deficits   SKIN: No rashes; no palpable lesions      LABS:                        11.8   6.51  )-----------( 168      ( 14 Jan 2024 07:35 )             36.7      01-14    139  |  103  |  6<L>  ----------------------------<  85  4.4   |  26  |  0.62    Ca    9.0      14 Jan 2024 07:40  Phos  4.1     01-14  Mg     1.6     01-14         CAPILLARY BLOOD GLUCOSE          RADIOLOGY & ADDITIONAL TESTS:    Imaging Personally Reviewed:  Consultant(s) Notes Reviewed:    Care Discussed with Consultants/Other Providers:   Andre Reyes, M.D.  Pager: [phone]  Office: [phone]    Patient is a 73y old  Female who presents with a chief complaint of facial swelling (14 Jan 2024 14:07)          SUBJECTIVE / OVERNIGHT EVENTS:    No acute overnight events.  still with come pain but was able to eat a little more food.    ROS: ( - ) Fever, ( - )Chills,  ( - )Nausea/Vomiting, ( - ) Cough, ( - )Shortness of breath, ( - )Chest Pain    MEDICATIONS  (STANDING):  chlorhexidine 0.12% Liquid 15 milliLiter(s) Swish and Spit two times a day  dextrose 5% + sodium chloride 0.45%. 1000 milliLiter(s) (80 mL/Hr) IV Continuous <Continuous>  enoxaparin Injectable 40 milliGRAM(s) SubCutaneous every 24 hours  gabapentin 100 milliGRAM(s) Oral three times a day  ibuprofen  Tablet. 800 milliGRAM(s) Oral every 8 hours  memantine 10 milliGRAM(s) Oral two times a day  naloxone Injectable 0.4 milliGRAM(s) IV Push once  pantoprazole    Tablet 40 milliGRAM(s) Oral before breakfast  piperacillin/tazobactam IVPB.. 3.375 Gram(s) IV Intermittent every 8 hours  polyethylene glycol 3350 17 Gram(s) Oral daily  senna 2 Tablet(s) Oral at bedtime    MEDICATIONS  (PRN):  acetaminophen     Tablet .. 650 milliGRAM(s) Oral every 6 hours PRN Temp greater or equal to 38C (100.4F), Mild Pain (1 - 3)  aluminum hydroxide/magnesium hydroxide/simethicone Suspension 30 milliLiter(s) Oral every 4 hours PRN Dyspepsia  bisacodyl 5 milliGRAM(s) Oral daily PRN Constipation  melatonin 3 milliGRAM(s) Oral at bedtime PRN Insomnia  ondansetron Injectable 4 milliGRAM(s) IV Push every 8 hours PRN Nausea and/or Vomiting  oxyCODONE    IR 5 milliGRAM(s) Oral every 4 hours PRN Moderate Pain (4 - 6)  oxyCODONE    IR 10 milliGRAM(s) Oral every 4 hours PRN Severe Pain (7 - 10)          T(C): 36.9 (01-15 @ 04:47), Max: 36.9 (01-14 @ 09:13)   HR: 73   BP: 142/84   RR: 18   SpO2: 100%    PHYSICAL EXAM:    CONSTITUTIONAL: NAD, well-developed, well-groomed  EYES: PERRLA; conjunctiva and sclera clear  ENMT: Moist oral mucosa, no pharyngeal injection or exudates; normal dentition  NECK: Supple, no palpable masses; no thyromegaly  RESPIRATORY: Normal respiratory effort; lungs are clear to auscultation bilaterally  CARDIOVASCULAR: Regular rate and rhythm, normal S1 and S2, no murmur/rub/gallop; No lower extremity edema; Peripheral pulses are 2+ bilaterally  ABDOMEN: Nontender to palpation, normoactive bowel sounds, no rebound/guarding; No hepatosplenomegaly  MUSCULOSKELETAL:  no clubbing or cyanosis of digits; no joint swelling or tenderness to palpation  PSYCH: A+O to person, place, and time; affect appropriate  NEUROLOGY: CN 2-12 are intact and symmetric; no gross sensory deficits   SKIN: No rashes; no palpable lesions      LABS:                        11.8   6.51  )-----------( 168      ( 14 Jan 2024 07:35 )             36.7      01-14    139  |  103  |  6<L>  ----------------------------<  85  4.4   |  26  |  0.62    Ca    9.0      14 Jan 2024 07:40  Phos  4.1     01-14  Mg     1.6     01-14         CAPILLARY BLOOD GLUCOSE          RADIOLOGY & ADDITIONAL TESTS:    Imaging Personally Reviewed:  Consultant(s) Notes Reviewed:    Care Discussed with Consultants/Other Providers:   Andre Reyes, M.D.  Pager: [phone]  Office: [phone]    Patient is a 73y old  Female who presents with a chief complaint of facial swelling (14 Jan 2024 14:07)          SUBJECTIVE / OVERNIGHT EVENTS:    No acute overnight events.  still with come pain but was able to eat a little more food.    ROS: ( - ) Fever, ( - )Chills,  ( - )Nausea/Vomiting, ( - ) Cough, ( - )Shortness of breath, ( - )Chest Pain    MEDICATIONS  (STANDING):  chlorhexidine 0.12% Liquid 15 milliLiter(s) Swish and Spit two times a day  dextrose 5% + sodium chloride 0.45%. 1000 milliLiter(s) (80 mL/Hr) IV Continuous <Continuous>  enoxaparin Injectable 40 milliGRAM(s) SubCutaneous every 24 hours  gabapentin 100 milliGRAM(s) Oral three times a day  ibuprofen  Tablet. 800 milliGRAM(s) Oral every 8 hours  memantine 10 milliGRAM(s) Oral two times a day  naloxone Injectable 0.4 milliGRAM(s) IV Push once  pantoprazole    Tablet 40 milliGRAM(s) Oral before breakfast  piperacillin/tazobactam IVPB.. 3.375 Gram(s) IV Intermittent every 8 hours  polyethylene glycol 3350 17 Gram(s) Oral daily  senna 2 Tablet(s) Oral at bedtime    MEDICATIONS  (PRN):  acetaminophen     Tablet .. 650 milliGRAM(s) Oral every 6 hours PRN Temp greater or equal to 38C (100.4F), Mild Pain (1 - 3)  aluminum hydroxide/magnesium hydroxide/simethicone Suspension 30 milliLiter(s) Oral every 4 hours PRN Dyspepsia  bisacodyl 5 milliGRAM(s) Oral daily PRN Constipation  melatonin 3 milliGRAM(s) Oral at bedtime PRN Insomnia  ondansetron Injectable 4 milliGRAM(s) IV Push every 8 hours PRN Nausea and/or Vomiting  oxyCODONE    IR 5 milliGRAM(s) Oral every 4 hours PRN Moderate Pain (4 - 6)  oxyCODONE    IR 10 milliGRAM(s) Oral every 4 hours PRN Severe Pain (7 - 10)          T(C): 36.9 (01-15 @ 04:47), Max: 36.9 (01-14 @ 09:13)   HR: 73   BP: 142/84   RR: 18   SpO2: 100%    PHYSICAL EXAM:    CONSTITUTIONAL: NAD, well-developed, well-groomed  EYES: PERRLA; conjunctiva and sclera clear  ENMT: Moist oral mucosa, no pharyngeal injection or exudates; normal dentition  NECK: Supple, no palpable masses; no thyromegaly  RESPIRATORY: Normal respiratory effort; lungs are clear to auscultation bilaterally  CARDIOVASCULAR: Regular rate and rhythm, normal S1 and S2, no murmur/rub/gallop; No lower extremity edema; Peripheral pulses are 2+ bilaterally  ABDOMEN: Nontender to palpation, normoactive bowel sounds, no rebound/guarding; No hepatosplenomegaly  MUSCULOSKELETAL:  no clubbing or cyanosis of digits; no joint swelling or tenderness to palpation  PSYCH: A+O to person, place, and time; affect appropriate  NEUROLOGY: CN 2-12 are intact and symmetric; no gross sensory deficits   SKIN: No rashes; no palpable lesions      LABS:                        11.8   6.51  )-----------( 168      ( 14 Jan 2024 07:35 )             36.7      01-14    139  |  103  |  6<L>  ----------------------------<  85  4.4   |  26  |  0.62    Ca    9.0      14 Jan 2024 07:40  Phos  4.1     01-14  Mg     1.6     01-14         CAPILLARY BLOOD GLUCOSE          RADIOLOGY & ADDITIONAL TESTS:    Imaging Personally Reviewed:  Consultant(s) Notes Reviewed:    Care Discussed with Consultants/Other Providers:

## 2024-01-15 NOTE — PROGRESS NOTE ADULT - PROBLEM SELECTOR PLAN 1
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

## 2024-01-15 NOTE — PROGRESS NOTE ADULT - NUTRITIONAL ASSESSMENT
This patient has been assessed with a concern for Malnutrition and has been determined to have a diagnosis/diagnoses of Mild protein-calorie malnutrition.    This patient is being managed with:   Diet Regular-  Supplement Feeding Modality:  Oral  Ensure Enlive Cans or Servings Per Day:  2       Frequency:  Daily  Entered: Jan 13 2024 11:12AM  
This patient has been assessed with a concern for Malnutrition and has been determined to have a diagnosis/diagnoses of Mild protein-calorie malnutrition.    This patient is being managed with:   Diet Regular-  Supplement Feeding Modality:  Oral  Ensure Enlive Cans or Servings Per Day:  2       Frequency:  Daily  Entered: Jan 13 2024 11:12AM  
This patient has been assessed with a concern for Malnutrition and has been determined to have a diagnosis/diagnoses of Mild protein-calorie malnutrition.    This patient is being managed with:   Diet Pureed-  Supplement Feeding Modality:  Oral  Ensure Plus High Protein Cans or Servings Per Day:  2       Frequency:  Daily  Entered: Jan 12 2024  1:51PM  
This patient has been assessed with a concern for Malnutrition and has been determined to have a diagnosis/diagnoses of Mild protein-calorie malnutrition.    This patient is being managed with:   Diet Regular-  Supplement Feeding Modality:  Oral  Ensure Enlive Cans or Servings Per Day:  2       Frequency:  Daily  Entered: Jan 13 2024 11:12AM

## 2024-01-17 LAB
CULTURE RESULTS: ABNORMAL
SPECIMEN SOURCE: SIGNIFICANT CHANGE UP

## 2024-01-22 ENCOUNTER — APPOINTMENT (OUTPATIENT)
Dept: INTERNAL MEDICINE | Facility: CLINIC | Age: 74
End: 2024-01-22
Payer: MEDICARE

## 2024-01-22 VITALS
HEART RATE: 81 BPM | OXYGEN SATURATION: 98 % | DIASTOLIC BLOOD PRESSURE: 72 MMHG | WEIGHT: 115 LBS | BODY MASS INDEX: 20.38 KG/M2 | TEMPERATURE: 97.7 F | HEIGHT: 63 IN | SYSTOLIC BLOOD PRESSURE: 117 MMHG

## 2024-01-22 DIAGNOSIS — K58.9 IRRITABLE BOWEL SYNDROME W/OUT DIARRHEA: ICD-10-CM

## 2024-01-22 PROCEDURE — 99495 TRANSJ CARE MGMT MOD F2F 14D: CPT

## 2024-01-22 NOTE — HISTORY OF PRESENT ILLNESS
[Post-hospitalization from ___ Hospital] : Post-hospitalization from [unfilled] Hospital [Admitted on: ___] : The patient was admitted on [unfilled] [Discharge Summary] : discharge summary [Pertinent Labs] : pertinent labs [Radiology Findings] : radiology findings [Discharge Med List] : discharge medication list [Med Reconciliation] : medication reconciliation has been completed [Patient Contacted By: ____] : and contacted by [unfilled] [FreeTextEntry2] : Pt w ONJ from [facility], s/p hosp for dental necrosis/abscess/pain. sp Abx, Oxycodone, Ibuprof , I & D, Lance Cooper and Con.  Here w partner Sy     They describe worsening memory past few wks. She d/cd Memantine, now seeing Dr. Epstein. We had discussed trying to get Leqembi, new MAB drug for Alzheimers. Recent Pet CT shows increasing amyloid. [place]eng Card Dr. Marroquin for palps. Seeing ENT dr. Andujar Off pain meds, caaused severe constipation; off ibuprof, abx finished. Lost wt but now starting to eat again. Using melatonin for sleep w good results.

## 2024-01-22 NOTE — ASSESSMENT
[FreeTextEntry1] : Pt w ONJ s/p hosp for I & D of abscess, abx, pain control. Has lost wt, we discussed importance of nutrition and calories Discussed neurologic issues w pt and partner, he needs to accompany her to appointmts and they need to discuss her needs going forward ( she is alone during the wk while he's in the [facility]). They will call and discuss Ramona w Dr. Epstein. Last Dexa 2/23 was festus, she will f/u w Rheum, off agents for now, ONJ.   f/u 6 wks

## 2024-01-22 NOTE — PHYSICAL EXAM
[No Acute Distress] : no acute distress [Well Nourished] : well nourished [Well Developed] : well developed [Well-Appearing] : well-appearing [Normal Sclera/Conjunctiva] : normal sclera/conjunctiva [PERRL] : pupils equal round and reactive to light [EOMI] : extraocular movements intact [Normal Outer Ear/Nose] : the outer ears and nose were normal in appearance [No JVD] : no jugular venous distention [No Lymphadenopathy] : no lymphadenopathy [Supple] : supple [Thyroid Normal, No Nodules] : the thyroid was normal and there were no nodules present [No Respiratory Distress] : no respiratory distress  [No Accessory Muscle Use] : no accessory muscle use [Clear to Auscultation] : lungs were clear to auscultation bilaterally [Normal Rate] : normal rate  [Regular Rhythm] : with a regular rhythm [Normal S1, S2] : normal S1 and S2 [No Murmur] : no murmur heard [No Carotid Bruits] : no carotid bruits [No Abdominal Bruit] : a ~M bruit was not heard ~T in the abdomen [No Varicosities] : no varicosities [Pedal Pulses Present] : the pedal pulses are present [No Edema] : there was no peripheral edema [No Palpable Aorta] : no palpable aorta [No Extremity Clubbing/Cyanosis] : no extremity clubbing/cyanosis [Soft] : abdomen soft [Non Tender] : non-tender [Non-distended] : non-distended [No Masses] : no abdominal mass palpated [No HSM] : no HSM [Normal Bowel Sounds] : normal bowel sounds [Normal Posterior Cervical Nodes] : no posterior cervical lymphadenopathy [Normal Anterior Cervical Nodes] : no anterior cervical lymphadenopathy [No CVA Tenderness] : no CVA  tenderness [No Spinal Tenderness] : no spinal tenderness [No Joint Swelling] : no joint swelling [Grossly Normal Strength/Tone] : grossly normal strength/tone [No Rash] : no rash [Coordination Grossly Intact] : coordination grossly intact [No Focal Deficits] : no focal deficits [Normal Gait] : normal gait [Deep Tendon Reflexes (DTR)] : deep tendon reflexes were 2+ and symmetric [Normal Affect] : the affect was normal [Normal Insight/Judgement] : insight and judgment were intact [de-identified] : lower gums w bony growth but no necrosis [50748 - Moderate Complexity requires multiple possible diagnoses and/or the management options, moderate complexity of the medical data (tests, etc.) to be reviewed, and moderate risk of significant complications, morbidity, and/or mortality as well as co] : Moderate Complexity

## 2024-02-12 ENCOUNTER — APPOINTMENT (OUTPATIENT)
Dept: INTERNAL MEDICINE | Facility: CLINIC | Age: 74
End: 2024-02-12
Payer: MEDICARE

## 2024-02-12 VITALS
BODY MASS INDEX: 20.55 KG/M2 | DIASTOLIC BLOOD PRESSURE: 77 MMHG | TEMPERATURE: 98.9 F | HEART RATE: 81 BPM | WEIGHT: 116 LBS | OXYGEN SATURATION: 97 % | SYSTOLIC BLOOD PRESSURE: 127 MMHG

## 2024-02-12 DIAGNOSIS — R39.9 UNSPECIFIED SYMPTOMS AND SIGNS INVOLVING THE GENITOURINARY SYSTEM: ICD-10-CM

## 2024-02-12 DIAGNOSIS — Z00.00 ENCOUNTER FOR GENERAL ADULT MEDICAL EXAMINATION W/OUT ABNORMAL FINDINGS: ICD-10-CM

## 2024-02-12 DIAGNOSIS — E87.6 HYPOKALEMIA: ICD-10-CM

## 2024-02-12 DIAGNOSIS — R52 PAIN, UNSPECIFIED: ICD-10-CM

## 2024-02-12 PROCEDURE — G0439: CPT

## 2024-02-12 NOTE — HEALTH RISK ASSESSMENT
[Fair] :  ~his/her~ mood as fair [No] : No [0] : 2) Feeling down, depressed, or hopeless: Not at all (0) [PHQ-2 Negative - No further assessment needed] : PHQ-2 Negative - No further assessment needed [de-identified] : some [de-identified] : healthy, not enough calories tho [[id number]] : 0 [Patient reported mammogram was normal] : Patient reported mammogram was normal [Patient reported bone density results were normal] : Patient reported bone density results were normal [Change in mental status noted] : No change in mental status noted [Handling Complex Tasks] : difficulty handling complex tasks [None] : None [MammogramDate] : 11/23 [BoneDensityDate] : 02/23 [ColonoscopyComments] : ?'13 may be due

## 2024-02-12 NOTE — ASSESSMENT
[FreeTextEntry1] : Pt w early cog decline, stil doing ok but anxious and upset. I will touch base w her neurologist about trying to get Leqambi, new med.  Tdap and Shingrix to pharm  labs in a few wks  Requests urogyn for urinary frequency, names provided.  f/u 2 mos

## 2024-02-12 NOTE — HISTORY OF PRESENT ILLNESS
[FreeTextEntry1] : Annual wellness [de-identified] : 75 yo woman  for wellness exam. h/o  Osteoporosis on Prolia, subsequently  w ONJ, which may have started after dental extraction. h/o early cognitive decline, anxiety, IBS, insomnia, palps, allergic rhinitis, Raynauds', thrombocytopenia  She sees Neuro, Rheum, ENT, Cardiol, GI Currently seeing Dr. Epstein. for neuro. States she called and requested the new Alzheimers drug but did not hear back yet. Saw Cardiol Dr. Marroquin, he started her on Ensure and protein shakes and asked that she do labs in a few wks. c/o urinary frequency, gets up at night `~ 4x to urinate, x many yrs, no dysuria.  Not exercising as before, dgtr teaches pilates and can help her do more. Using ZZZquil at bedtime w good relief.  Is taking Memantine and Omepr.  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 Still feeling anxious/upset about her cognitive decline. We've discussed getting a therapist, has not done this. The anxiety can be severe at times.

## 2024-02-12 NOTE — PHYSICAL EXAM
[No Acute Distress] : no acute distress [Well Nourished] : well nourished [Well Developed] : well developed [Well-Appearing] : well-appearing [Normal Sclera/Conjunctiva] : normal sclera/conjunctiva [PERRL] : pupils equal round and reactive to light [EOMI] : extraocular movements intact [Normal Outer Ear/Nose] : the outer ears and nose were normal in appearance [Normal Oropharynx] : the oropharynx was normal [No JVD] : no jugular venous distention [No Lymphadenopathy] : no lymphadenopathy [Supple] : supple [Thyroid Normal, No Nodules] : the thyroid was normal and there were no nodules present [No Respiratory Distress] : no respiratory distress  [No Accessory Muscle Use] : no accessory muscle use [Clear to Auscultation] : lungs were clear to auscultation bilaterally [Normal Rate] : normal rate  [Regular Rhythm] : with a regular rhythm [Normal S1, S2] : normal S1 and S2 [No Murmur] : no murmur heard [No Carotid Bruits] : no carotid bruits [No Abdominal Bruit] : a ~M bruit was not heard ~T in the abdomen [No Varicosities] : no varicosities [Pedal Pulses Present] : the pedal pulses are present [No Edema] : there was no peripheral edema [No Palpable Aorta] : no palpable aorta [No Extremity Clubbing/Cyanosis] : no extremity clubbing/cyanosis [Soft] : abdomen soft [Non Tender] : non-tender [Non-distended] : non-distended [No Masses] : no abdominal mass palpated [No HSM] : no HSM [Normal Bowel Sounds] : normal bowel sounds [Normal Posterior Cervical Nodes] : no posterior cervical lymphadenopathy [Normal Anterior Cervical Nodes] : no anterior cervical lymphadenopathy [No CVA Tenderness] : no CVA  tenderness [No Spinal Tenderness] : no spinal tenderness [No Joint Swelling] : no joint swelling [Grossly Normal Strength/Tone] : grossly normal strength/tone [No Rash] : no rash [Coordination Grossly Intact] : coordination grossly intact [No Focal Deficits] : no focal deficits [Normal Gait] : normal gait [Deep Tendon Reflexes (DTR)] : deep tendon reflexes were 2+ and symmetric [Normal Affect] : the affect was normal [Normal Insight/Judgement] : insight and judgment were intact [de-identified] : anxious, mildly forgetful but  writing things down to cope

## 2024-02-20 ENCOUNTER — NON-APPOINTMENT (OUTPATIENT)
Age: 74
End: 2024-02-20

## 2024-02-26 ENCOUNTER — APPOINTMENT (OUTPATIENT)
Dept: RHEUMATOLOGY | Facility: CLINIC | Age: 74
End: 2024-02-26

## 2024-02-29 ENCOUNTER — APPOINTMENT (OUTPATIENT)
Dept: RHEUMATOLOGY | Facility: CLINIC | Age: 74
End: 2024-02-29
Payer: MEDICARE

## 2024-02-29 VITALS
OXYGEN SATURATION: 98 % | WEIGHT: 117 LBS | SYSTOLIC BLOOD PRESSURE: 154 MMHG | BODY MASS INDEX: 20.73 KG/M2 | DIASTOLIC BLOOD PRESSURE: 86 MMHG | HEART RATE: 85 BPM | HEIGHT: 63 IN

## 2024-02-29 DIAGNOSIS — R20.9 UNSPECIFIED DISTURBANCES OF SKIN SENSATION: ICD-10-CM

## 2024-02-29 PROCEDURE — 99214 OFFICE O/P EST MOD 30 MIN: CPT

## 2024-03-01 LAB
25(OH)D3 SERPL-MCNC: 104 NG/ML
ALBUMIN SERPL ELPH-MCNC: 4 G/DL
ALP BLD-CCNC: 68 U/L
ALT SERPL-CCNC: 9 U/L
ANION GAP SERPL CALC-SCNC: 13 MMOL/L
AST SERPL-CCNC: 18 U/L
BASOPHILS # BLD AUTO: 0.04 K/UL
BASOPHILS NFR BLD AUTO: 0.6 %
BILIRUB SERPL-MCNC: 0.4 MG/DL
BUN SERPL-MCNC: 13 MG/DL
CALCIUM SERPL-MCNC: 9.2 MG/DL
CENTROMERE IGG SER-ACNC: <0.2 AL
CHLORIDE SERPL-SCNC: 100 MMOL/L
CO2 SERPL-SCNC: 27 MMOL/L
CREAT SERPL-MCNC: 1.05 MG/DL
CRP SERPL-MCNC: 11 MG/L
EGFR: 56 ML/MIN/1.73M2
ENA SCL70 IGG SER IA-ACNC: <0.2 AL
EOSINOPHIL # BLD AUTO: 0.15 K/UL
EOSINOPHIL NFR BLD AUTO: 2.1 %
ERYTHROCYTE [SEDIMENTATION RATE] IN BLOOD BY WESTERGREN METHOD: 13 MM/HR
FOLATE SERPL-MCNC: 6.9 NG/ML
HCT VFR BLD CALC: 36.8 %
HGB BLD-MCNC: 11.5 G/DL
IMM GRANULOCYTES NFR BLD AUTO: 0.3 %
LYMPHOCYTES # BLD AUTO: 1.92 K/UL
LYMPHOCYTES NFR BLD AUTO: 26.4 %
MAN DIFF?: NORMAL
MCHC RBC-ENTMCNC: 27.6 PG
MCHC RBC-ENTMCNC: 31.3 GM/DL
MCV RBC AUTO: 88.5 FL
MONOCYTES # BLD AUTO: 0.44 K/UL
MONOCYTES NFR BLD AUTO: 6.1 %
NEUTROPHILS # BLD AUTO: 4.7 K/UL
NEUTROPHILS NFR BLD AUTO: 64.5 %
PLATELET # BLD AUTO: 165 K/UL
POTASSIUM SERPL-SCNC: 4.4 MMOL/L
PROT SERPL-MCNC: 6.9 G/DL
RBC # BLD: 4.16 M/UL
RBC # FLD: 14.9 %
SODIUM SERPL-SCNC: 140 MMOL/L
TSH SERPL-ACNC: 1.39 UIU/ML
VIT B12 SERPL-MCNC: 396 PG/ML
WBC # FLD AUTO: 7.27 K/UL

## 2024-03-01 NOTE — ASSESSMENT
[FreeTextEntry1] : fuv  submandibular infection, says she has cold hand and feel w/o skin changes, but at the end of visit says "maybe they're burning" clear memory issues  w/u in progress w/ neuro MD   not taking Rx for OP now due to concern about SE  >> check labs for neuropathy and ?RP >> advised to d/w neuro MD   fuv PRN patient is aware of and in agreement with assessment and plans

## 2024-03-01 NOTE — HISTORY OF PRESENT ILLNESS
[FreeTextEntry1] : fuv  submandibular infection, says she has cold hand and feel w/o skin changes, but at the end of visit says "maybe they're burning" clear memory issues  w/u in progress w/ neuro MD   not taking Rx for OP now due to concern about SE      pt w/ OP = recent DEXA FEB 2023 and received prolia August 2023 after that visit she was dx with tooth infection and then ONJ  thought possible 2/2 use of prolia  today we review this and that she will likely need different Rx in the future   also d/w her the plan for assessment ongoing of her dementia advised to bring family member to these visits neck and upper back pain - would consider gabapentin but advised to speak to neurology MD

## 2024-03-05 ENCOUNTER — APPOINTMENT (OUTPATIENT)
Dept: OTOLARYNGOLOGY | Facility: CLINIC | Age: 74
End: 2024-03-05
Payer: MEDICARE

## 2024-03-05 ENCOUNTER — INPATIENT (INPATIENT)
Facility: HOSPITAL | Age: 74
LOS: 6 days | Discharge: HOME HEALTH | DRG: 141 | End: 2024-03-12
Attending: OTOLARYNGOLOGY | Admitting: OTOLARYNGOLOGY
Payer: MEDICARE

## 2024-03-05 ENCOUNTER — TRANSCRIPTION ENCOUNTER (OUTPATIENT)
Age: 74
End: 2024-03-05

## 2024-03-05 VITALS
HEART RATE: 68 BPM | TEMPERATURE: 98 F | OXYGEN SATURATION: 94 % | DIASTOLIC BLOOD PRESSURE: 81 MMHG | SYSTOLIC BLOOD PRESSURE: 126 MMHG | RESPIRATION RATE: 16 BRPM

## 2024-03-05 VITALS
DIASTOLIC BLOOD PRESSURE: 84 MMHG | WEIGHT: 117 LBS | SYSTOLIC BLOOD PRESSURE: 136 MMHG | HEART RATE: 78 BPM | HEIGHT: 63 IN | TEMPERATURE: 98.1 F | BODY MASS INDEX: 20.73 KG/M2

## 2024-03-05 DIAGNOSIS — L02.11 CUTANEOUS ABSCESS OF NECK: ICD-10-CM

## 2024-03-05 LAB
ALBUMIN SERPL ELPH-MCNC: 4.2 G/DL — SIGNIFICANT CHANGE UP (ref 3.3–5)
ALP SERPL-CCNC: 61 U/L — SIGNIFICANT CHANGE UP (ref 40–120)
ALT FLD-CCNC: 9 U/L — LOW (ref 10–45)
ANION GAP SERPL CALC-SCNC: 11 MMOL/L — SIGNIFICANT CHANGE UP (ref 5–17)
ANION GAP SERPL CALC-SCNC: 11 MMOL/L — SIGNIFICANT CHANGE UP (ref 5–17)
APTT BLD: 29.4 SEC — SIGNIFICANT CHANGE UP (ref 24.5–35.6)
AST SERPL-CCNC: 21 U/L — SIGNIFICANT CHANGE UP (ref 10–40)
BASOPHILS # BLD AUTO: 0.03 K/UL — SIGNIFICANT CHANGE UP (ref 0–0.2)
BASOPHILS NFR BLD AUTO: 0.4 % — SIGNIFICANT CHANGE UP (ref 0–2)
BILIRUB SERPL-MCNC: 0.7 MG/DL — SIGNIFICANT CHANGE UP (ref 0.2–1.2)
BLD GP AB SCN SERPL QL: NEGATIVE — SIGNIFICANT CHANGE UP
BUN SERPL-MCNC: 14 MG/DL — SIGNIFICANT CHANGE UP (ref 7–23)
BUN SERPL-MCNC: 14 MG/DL — SIGNIFICANT CHANGE UP (ref 7–23)
CALCIUM SERPL-MCNC: 10.2 MG/DL — SIGNIFICANT CHANGE UP (ref 8.4–10.5)
CALCIUM SERPL-MCNC: 9.7 MG/DL — SIGNIFICANT CHANGE UP (ref 8.4–10.5)
CHLORIDE SERPL-SCNC: 101 MMOL/L — SIGNIFICANT CHANGE UP (ref 96–108)
CHLORIDE SERPL-SCNC: 101 MMOL/L — SIGNIFICANT CHANGE UP (ref 96–108)
CO2 SERPL-SCNC: 28 MMOL/L — SIGNIFICANT CHANGE UP (ref 22–31)
CO2 SERPL-SCNC: 28 MMOL/L — SIGNIFICANT CHANGE UP (ref 22–31)
CREAT SERPL-MCNC: 0.83 MG/DL — SIGNIFICANT CHANGE UP (ref 0.5–1.3)
CREAT SERPL-MCNC: 0.9 MG/DL — SIGNIFICANT CHANGE UP (ref 0.5–1.3)
EGFR: 68 ML/MIN/1.73M2 — SIGNIFICANT CHANGE UP
EGFR: 74 ML/MIN/1.73M2 — SIGNIFICANT CHANGE UP
EOSINOPHIL # BLD AUTO: 0.05 K/UL — SIGNIFICANT CHANGE UP (ref 0–0.5)
EOSINOPHIL NFR BLD AUTO: 0.6 % — SIGNIFICANT CHANGE UP (ref 0–6)
GLUCOSE SERPL-MCNC: 105 MG/DL — HIGH (ref 70–99)
GLUCOSE SERPL-MCNC: 110 MG/DL — HIGH (ref 70–99)
HCT VFR BLD CALC: 36.8 % — SIGNIFICANT CHANGE UP (ref 34.5–45)
HGB BLD-MCNC: 11.6 G/DL — SIGNIFICANT CHANGE UP (ref 11.5–15.5)
IMM GRANULOCYTES NFR BLD AUTO: 0.2 % — SIGNIFICANT CHANGE UP (ref 0–0.9)
INR BLD: 1.05 — SIGNIFICANT CHANGE UP (ref 0.85–1.18)
LYMPHOCYTES # BLD AUTO: 2 K/UL — SIGNIFICANT CHANGE UP (ref 1–3.3)
LYMPHOCYTES # BLD AUTO: 24.6 % — SIGNIFICANT CHANGE UP (ref 13–44)
MCHC RBC-ENTMCNC: 27.6 PG — SIGNIFICANT CHANGE UP (ref 27–34)
MCHC RBC-ENTMCNC: 31.5 GM/DL — LOW (ref 32–36)
MCV RBC AUTO: 87.4 FL — SIGNIFICANT CHANGE UP (ref 80–100)
MONOCYTES # BLD AUTO: 0.42 K/UL — SIGNIFICANT CHANGE UP (ref 0–0.9)
MONOCYTES NFR BLD AUTO: 5.2 % — SIGNIFICANT CHANGE UP (ref 2–14)
NEUTROPHILS # BLD AUTO: 5.6 K/UL — SIGNIFICANT CHANGE UP (ref 1.8–7.4)
NEUTROPHILS NFR BLD AUTO: 69 % — SIGNIFICANT CHANGE UP (ref 43–77)
NRBC # BLD: 0 /100 WBCS — SIGNIFICANT CHANGE UP (ref 0–0)
PLATELET # BLD AUTO: 172 K/UL — SIGNIFICANT CHANGE UP (ref 150–400)
POTASSIUM SERPL-MCNC: 3.6 MMOL/L — SIGNIFICANT CHANGE UP (ref 3.5–5.3)
POTASSIUM SERPL-MCNC: 3.9 MMOL/L — SIGNIFICANT CHANGE UP (ref 3.5–5.3)
POTASSIUM SERPL-SCNC: 3.6 MMOL/L — SIGNIFICANT CHANGE UP (ref 3.5–5.3)
POTASSIUM SERPL-SCNC: 3.9 MMOL/L — SIGNIFICANT CHANGE UP (ref 3.5–5.3)
PROT SERPL-MCNC: 6.9 G/DL — SIGNIFICANT CHANGE UP (ref 6–8.3)
PROTHROM AB SERPL-ACNC: 12 SEC — SIGNIFICANT CHANGE UP (ref 9.5–13)
RBC # BLD: 4.21 M/UL — SIGNIFICANT CHANGE UP (ref 3.8–5.2)
RBC # FLD: 14.6 % — HIGH (ref 10.3–14.5)
RH IG SCN BLD-IMP: POSITIVE — SIGNIFICANT CHANGE UP
SODIUM SERPL-SCNC: 140 MMOL/L — SIGNIFICANT CHANGE UP (ref 135–145)
SODIUM SERPL-SCNC: 140 MMOL/L — SIGNIFICANT CHANGE UP (ref 135–145)
WBC # BLD: 8.12 K/UL — SIGNIFICANT CHANGE UP (ref 3.8–10.5)
WBC # FLD AUTO: 8.12 K/UL — SIGNIFICANT CHANGE UP (ref 3.8–10.5)

## 2024-03-05 PROCEDURE — 70491 CT SOFT TISSUE NECK W/DYE: CPT | Mod: 26

## 2024-03-05 PROCEDURE — 99205 OFFICE O/P NEW HI 60 MIN: CPT

## 2024-03-05 RX ORDER — PANTOPRAZOLE SODIUM 20 MG/1
40 TABLET, DELAYED RELEASE ORAL
Refills: 0 | Status: DISCONTINUED | OUTPATIENT
Start: 2024-03-05 | End: 2024-03-06

## 2024-03-05 RX ORDER — ENOXAPARIN SODIUM 100 MG/ML
40 INJECTION SUBCUTANEOUS EVERY 24 HOURS
Refills: 0 | Status: DISCONTINUED | OUTPATIENT
Start: 2024-03-05 | End: 2024-03-06

## 2024-03-05 RX ORDER — LANOLIN ALCOHOL/MO/W.PET/CERES
5 CREAM (GRAM) TOPICAL AT BEDTIME
Refills: 0 | Status: DISCONTINUED | OUTPATIENT
Start: 2024-03-05 | End: 2024-03-06

## 2024-03-05 RX ORDER — OMEPRAZOLE 40 MG/1
CAPSULE, DELAYED RELEASE ORAL
Refills: 0 | Status: ACTIVE | COMMUNITY

## 2024-03-05 RX ORDER — OXYCODONE HYDROCHLORIDE 5 MG/1
5 TABLET ORAL EVERY 4 HOURS
Refills: 0 | Status: DISCONTINUED | OUTPATIENT
Start: 2024-03-05 | End: 2024-03-06

## 2024-03-05 RX ORDER — IBUPROFEN 200 MG
400 TABLET ORAL EVERY 6 HOURS
Refills: 0 | Status: DISCONTINUED | OUTPATIENT
Start: 2024-03-05 | End: 2024-03-06

## 2024-03-05 RX ORDER — ACETAMINOPHEN 500 MG
650 TABLET ORAL EVERY 6 HOURS
Refills: 0 | Status: DISCONTINUED | OUTPATIENT
Start: 2024-03-05 | End: 2024-03-06

## 2024-03-05 RX ORDER — SODIUM CHLORIDE 9 MG/ML
1000 INJECTION, SOLUTION INTRAVENOUS
Refills: 0 | Status: DISCONTINUED | OUTPATIENT
Start: 2024-03-06 | End: 2024-03-06

## 2024-03-05 RX ORDER — ATORVASTATIN CALCIUM 80 MG/1
10 TABLET, FILM COATED ORAL AT BEDTIME
Refills: 0 | Status: DISCONTINUED | OUTPATIENT
Start: 2024-03-05 | End: 2024-03-06

## 2024-03-05 RX ORDER — MEMANTINE HYDROCHLORIDE 10 MG/1
10 TABLET ORAL
Refills: 0 | Status: DISCONTINUED | OUTPATIENT
Start: 2024-03-05 | End: 2024-03-06

## 2024-03-05 RX ADMIN — Medication 400 MILLIGRAM(S): at 19:33

## 2024-03-05 RX ADMIN — ENOXAPARIN SODIUM 40 MILLIGRAM(S): 100 INJECTION SUBCUTANEOUS at 21:54

## 2024-03-05 RX ADMIN — OXYCODONE HYDROCHLORIDE 5 MILLIGRAM(S): 5 TABLET ORAL at 21:20

## 2024-03-05 RX ADMIN — OXYCODONE HYDROCHLORIDE 5 MILLIGRAM(S): 5 TABLET ORAL at 20:20

## 2024-03-05 RX ADMIN — Medication 5 MILLIGRAM(S): at 21:54

## 2024-03-05 RX ADMIN — MEMANTINE HYDROCHLORIDE 10 MILLIGRAM(S): 10 TABLET ORAL at 21:54

## 2024-03-05 RX ADMIN — ATORVASTATIN CALCIUM 10 MILLIGRAM(S): 80 TABLET, FILM COATED ORAL at 21:53

## 2024-03-05 NOTE — DATA REVIEWED
[de-identified] : CT scan reviewed with FOM abscess noted, necrotic bone consistent with osteomyelitis vs MRONJ (this scan was prior to the drainage procedure)

## 2024-03-05 NOTE — HISTORY OF PRESENT ILLNESS
[de-identified] : 3/5/2024 74F with history of osteoporosis presenting with left neck abscess. She was recently admitted to [facility] after developing facial and neck swelling. This issue started in September after she had a tooth extracted and subsequently developed an abscess in the left neck/floor of mouth. Also though to have MRONJ from Prolia (last injection in January). CT neck showed abnormal enhancment in the floor of mouth and the area was drained and washed out on 1/11. Currently she is on Clindamycin but states that within the last month the infection has recurred and is worsening. Here to discuss definitive treatment with debridement as she has not improved.

## 2024-03-05 NOTE — H&P ADULT - HISTORY OF PRESENT ILLNESS
74 year old woman with PMH osteoporosis, with suspected left neck abscess. Patient was recently admitted at [facility] 1/2024 for medical management of  facial and neck swelling. Her symptoms began in September 2023 after a dental extraction with subsequent left neck/floor of mouth abscess. She also received Prolia injection last year and have developed MRONJ.  CT neck at that time showed abnormal enhancement in the floor of mouth and the area was drained and washed out on 1/11. She is s/p 10 day course of Clindamycin but states that within the last month the infection has recurred and is worsening. Patient was seen in ENT clinic 3/5/24 for her symptoms and sent to [facility] for direct admission for presurgical workup of left neck abscess.

## 2024-03-05 NOTE — REVIEW OF SYSTEMS
[As Noted in HPI] : as noted in HPI [Swelling Face] : face swelling [Swelling Neck] : swelling neck [Negative] : Heme/Lymph [FreeTextEntry4] : left submental swelling, pain

## 2024-03-05 NOTE — H&P ADULT - ASSESSMENT
74 year old woman with PMH osteoporosis and history of floor of mouth abscess presents with suspected recurrent floor of mouth/submental abscess related to MRONJ or osteomyelitis of the jaw. Patient will be admitted for preoperative work up.    -Admit to ENT service under Dr. Melgar  -NPO midnight, IV fluids  -Follow up CT neck with contrast Gabriela protocol with 1 mm cuts  -Follow up preop labs  -Follow up ID consult for antibiotics given Penicillin and Fluroquinolone

## 2024-03-05 NOTE — PHYSICAL EXAM
[de-identified] : Left submental swelling, pain on palpation, left facial swelling [de-identified] : Left lingual mandibular exposed bone, no active drainage [Normal] : no rashes [de-identified] : No V3 numbness

## 2024-03-05 NOTE — H&P ADULT - NSHPPHYSICALEXAM_GEN_ALL_CORE
General: NAD, sitting upright in bed, ambulates independently, communicates in strong non hoarse voice  Head: NC/AT  Face: symmetric facial movements bilaterally, intact CN VII bilaterally  Eyes: EOMI  Nose; Clear to anterior rhinoscopy  Ears: Auricles well formed, no mastoid tip tenderness   Oral cavity/oropharynx: moist mucous membranes, left sided exposed bone of mandible on lingual surface, floor of mouth tender to palpation on left side, not raised. Symmetric elevation of palate, posterior oropharynx visualized  Neck: Left level 1a/b fullness with tenderness to palpation most prominent left submental. Neck is soft. Full Range of motion of neck  Respiratory: NLB on RA    Flexible laryngoscopy findings: rightward nasal septal deviation, dry nasal mucosa, nasopharynx and oropharynx without masses or lesions, epiglottis sharp, AE folds and arytenoids sharp. Bilateral vocal folds fully mobile. No pooling of secretions. Subglottis not visualized

## 2024-03-06 ENCOUNTER — RESULT REVIEW (OUTPATIENT)
Age: 74
End: 2024-03-06

## 2024-03-06 LAB
GRAM STN FLD: ABNORMAL
GRAM STN FLD: ABNORMAL
NIGHT BLUE STAIN TISS: SIGNIFICANT CHANGE UP
NIGHT BLUE STAIN TISS: SIGNIFICANT CHANGE UP
SPECIMEN SOURCE: SIGNIFICANT CHANGE UP

## 2024-03-06 PROCEDURE — 88305 TISSUE EXAM BY PATHOLOGIST: CPT | Mod: 26

## 2024-03-06 PROCEDURE — 21025 EXCISION OF BONE LOWER JAW: CPT

## 2024-03-06 PROCEDURE — 41017 DRAINAGE OF MOUTH LESION: CPT

## 2024-03-06 PROCEDURE — 88311 DECALCIFY TISSUE: CPT | Mod: 26

## 2024-03-06 PROCEDURE — 99222 1ST HOSP IP/OBS MODERATE 55: CPT

## 2024-03-06 DEVICE — CLIP APPLIER ETHICON LIGACLIP 9 3/8" SMALL: Type: IMPLANTABLE DEVICE | Status: FUNCTIONAL

## 2024-03-06 DEVICE — CLIP APPLIER ETHICON LIGACLIP 11.5" MEDIUM: Type: IMPLANTABLE DEVICE | Status: FUNCTIONAL

## 2024-03-06 RX ORDER — MEMANTINE HYDROCHLORIDE 10 MG/1
10 TABLET ORAL
Refills: 0 | Status: DISCONTINUED | OUTPATIENT
Start: 2024-03-06 | End: 2024-03-12

## 2024-03-06 RX ORDER — AMPICILLIN SODIUM AND SULBACTAM SODIUM 250; 125 MG/ML; MG/ML
3 INJECTION, POWDER, FOR SUSPENSION INTRAMUSCULAR; INTRAVENOUS EVERY 6 HOURS
Refills: 0 | Status: DISCONTINUED | OUTPATIENT
Start: 2024-03-07 | End: 2024-03-10

## 2024-03-06 RX ORDER — OXYCODONE HYDROCHLORIDE 5 MG/1
2.5 TABLET ORAL EVERY 6 HOURS
Refills: 0 | Status: DISCONTINUED | OUTPATIENT
Start: 2024-03-06 | End: 2024-03-12

## 2024-03-06 RX ORDER — PENTOXIFYLLINE 400 MG
400 TABLET, EXTENDED RELEASE ORAL EVERY 12 HOURS
Refills: 0 | Status: DISCONTINUED | OUTPATIENT
Start: 2024-03-06 | End: 2024-03-12

## 2024-03-06 RX ORDER — SODIUM CHLORIDE 9 MG/ML
1000 INJECTION, SOLUTION INTRAVENOUS
Refills: 0 | Status: DISCONTINUED | OUTPATIENT
Start: 2024-03-06 | End: 2024-03-07

## 2024-03-06 RX ORDER — ATORVASTATIN CALCIUM 80 MG/1
10 TABLET, FILM COATED ORAL AT BEDTIME
Refills: 0 | Status: DISCONTINUED | OUTPATIENT
Start: 2024-03-06 | End: 2024-03-12

## 2024-03-06 RX ORDER — OXYCODONE HYDROCHLORIDE 5 MG/1
5 TABLET ORAL EVERY 6 HOURS
Refills: 0 | Status: DISCONTINUED | OUTPATIENT
Start: 2024-03-06 | End: 2024-03-12

## 2024-03-06 RX ORDER — PANTOPRAZOLE SODIUM 20 MG/1
40 TABLET, DELAYED RELEASE ORAL
Refills: 0 | Status: DISCONTINUED | OUTPATIENT
Start: 2024-03-06 | End: 2024-03-12

## 2024-03-06 RX ORDER — SODIUM CHLORIDE 9 MG/ML
1000 INJECTION, SOLUTION INTRAVENOUS
Refills: 0 | Status: DISCONTINUED | OUTPATIENT
Start: 2024-03-06 | End: 2024-03-06

## 2024-03-06 RX ORDER — ACETAMINOPHEN 500 MG
650 TABLET ORAL EVERY 6 HOURS
Refills: 0 | Status: DISCONTINUED | OUTPATIENT
Start: 2024-03-06 | End: 2024-03-12

## 2024-03-06 RX ORDER — IBUPROFEN 200 MG
400 TABLET ORAL EVERY 6 HOURS
Refills: 0 | Status: DISCONTINUED | OUTPATIENT
Start: 2024-03-06 | End: 2024-03-12

## 2024-03-06 RX ORDER — VITAMIN E 100 UNIT
1000 CAPSULE ORAL DAILY
Refills: 0 | Status: DISCONTINUED | OUTPATIENT
Start: 2024-03-06 | End: 2024-03-07

## 2024-03-06 RX ORDER — AMPICILLIN SODIUM AND SULBACTAM SODIUM 250; 125 MG/ML; MG/ML
3 INJECTION, POWDER, FOR SUSPENSION INTRAMUSCULAR; INTRAVENOUS ONCE
Refills: 0 | Status: COMPLETED | OUTPATIENT
Start: 2024-03-06 | End: 2024-03-06

## 2024-03-06 RX ORDER — AMPICILLIN SODIUM AND SULBACTAM SODIUM 250; 125 MG/ML; MG/ML
INJECTION, POWDER, FOR SUSPENSION INTRAMUSCULAR; INTRAVENOUS
Refills: 0 | Status: DISCONTINUED | OUTPATIENT
Start: 2024-03-06 | End: 2024-03-10

## 2024-03-06 RX ORDER — LANOLIN ALCOHOL/MO/W.PET/CERES
5 CREAM (GRAM) TOPICAL AT BEDTIME
Refills: 0 | Status: COMPLETED | OUTPATIENT
Start: 2024-03-06 | End: 2024-03-07

## 2024-03-06 RX ORDER — HYDROMORPHONE HYDROCHLORIDE 2 MG/ML
0.5 INJECTION INTRAMUSCULAR; INTRAVENOUS; SUBCUTANEOUS
Refills: 0 | Status: DISCONTINUED | OUTPATIENT
Start: 2024-03-06 | End: 2024-03-12

## 2024-03-06 RX ADMIN — ATORVASTATIN CALCIUM 10 MILLIGRAM(S): 80 TABLET, FILM COATED ORAL at 22:23

## 2024-03-06 RX ADMIN — OXYCODONE HYDROCHLORIDE 5 MILLIGRAM(S): 5 TABLET ORAL at 12:42

## 2024-03-06 RX ADMIN — OXYCODONE HYDROCHLORIDE 5 MILLIGRAM(S): 5 TABLET ORAL at 23:52

## 2024-03-06 RX ADMIN — SODIUM CHLORIDE 100 MILLILITER(S): 9 INJECTION, SOLUTION INTRAVENOUS at 11:11

## 2024-03-06 RX ADMIN — HYDROMORPHONE HYDROCHLORIDE 0.5 MILLIGRAM(S): 2 INJECTION INTRAMUSCULAR; INTRAVENOUS; SUBCUTANEOUS at 10:50

## 2024-03-06 RX ADMIN — OXYCODONE HYDROCHLORIDE 5 MILLIGRAM(S): 5 TABLET ORAL at 17:49

## 2024-03-06 RX ADMIN — HYDROMORPHONE HYDROCHLORIDE 0.5 MILLIGRAM(S): 2 INJECTION INTRAMUSCULAR; INTRAVENOUS; SUBCUTANEOUS at 10:23

## 2024-03-06 RX ADMIN — AMPICILLIN SODIUM AND SULBACTAM SODIUM 200 GRAM(S): 250; 125 INJECTION, POWDER, FOR SUSPENSION INTRAMUSCULAR; INTRAVENOUS at 18:58

## 2024-03-06 RX ADMIN — HYDROMORPHONE HYDROCHLORIDE 0.5 MILLIGRAM(S): 2 INJECTION INTRAMUSCULAR; INTRAVENOUS; SUBCUTANEOUS at 11:15

## 2024-03-06 RX ADMIN — HYDROMORPHONE HYDROCHLORIDE 0.5 MILLIGRAM(S): 2 INJECTION INTRAMUSCULAR; INTRAVENOUS; SUBCUTANEOUS at 10:54

## 2024-03-06 RX ADMIN — MEMANTINE HYDROCHLORIDE 10 MILLIGRAM(S): 10 TABLET ORAL at 17:15

## 2024-03-06 RX ADMIN — Medication 5 MILLIGRAM(S): at 22:23

## 2024-03-06 RX ADMIN — PANTOPRAZOLE SODIUM 40 MILLIGRAM(S): 20 TABLET, DELAYED RELEASE ORAL at 06:38

## 2024-03-06 RX ADMIN — Medication 400 MILLIGRAM(S): at 06:38

## 2024-03-06 RX ADMIN — OXYCODONE HYDROCHLORIDE 5 MILLIGRAM(S): 5 TABLET ORAL at 18:49

## 2024-03-06 RX ADMIN — OXYCODONE HYDROCHLORIDE 5 MILLIGRAM(S): 5 TABLET ORAL at 11:42

## 2024-03-06 RX ADMIN — MEMANTINE HYDROCHLORIDE 10 MILLIGRAM(S): 10 TABLET ORAL at 06:38

## 2024-03-06 NOTE — PROGRESS NOTE ADULT - ASSESSMENT
Assessment and Plan:  FILIPPO VIERA is a 74 year old woman with PMH osteoporosis and history of floor of mouth abscess presents with suspected recurrent floor of mouth/submental abscess related to MRONJ or osteomyelitis of the jaw. Patient will be admitted for preoperative work up.    PLAN:  - NPO  - OR today with Dr. Melgar   - ID consult due to  given Penicillin and Fluroquinolone  allergy   :   [facility] at [phone] with any questions/concerns.    Hyacinth Miller PA-C  03-06-24 @ 07:47

## 2024-03-06 NOTE — CONSULT NOTE ADULT - ASSESSMENT
74 year old woman with PMH osteoporosis, with suspected left neck abscess found to have MRONJ s/p OR washout and debridement 3/6. OR cultures sent- will follow. Abscess culture from prior washout 1/11 grew strep intermedius, actinomyces and anaerobes. She is afebrile, no leukocytosis, and stable.     Suggest:  -f/u OR cultures 3/6  -start Unasyn 3g IV Q6hrs   -anticipate 6 weeks of IV antibiotics     Team 2 will follow you.    Case d/w primary team.  Final recommendation pending attending note.    Lanette Mane, Infectious Diseases PA  Please reach out for any questions 9 am-5pm. For evenings and weekends, please call the ID physician on call.  Work cell: [phone]

## 2024-03-06 NOTE — CONSULT NOTE ADULT - SUBJECTIVE AND OBJECTIVE BOX
INFECTIOUS DISEASES INITIAL CONSULT NOTE    HPI:  74 year old woman with PMH osteoporosis, with suspected left neck abscess. Patient was recently admitted at [facility] 1/2024 for medical management of  facial and neck swelling. Her symptoms began in September 2023 after a dental extraction with subsequent left neck/floor of mouth abscess. She also received Prolia injection last year and have developed MRONJ.  CT neck at that time showed abnormal enhancement in the floor of mouth and the area was drained and washed out on 1/11. She is s/p 10 day course of Clindamycin but states that within the last month the infection has recurred and is worsening. Patient was seen in ENT clinic 3/5/24 for her symptoms and sent to [facility] for direct admission for presurgical workup of left neck abscess.  (05 Mar 2024 19:17)      PAST MEDICAL & SURGICAL HISTORY:  IBS (irritable bowel syndrome)      Hashimoto's thyroiditis      Osteoporosis            Review of Systems:   Constitutional, eyes, ENT, cardiovascular, respiratory, gastrointestinal, genitourinary, integumentary, neurological, psychiatric and heme/lymph are otherwise negative other than noted above       ANTIBIOTICS:  MEDICATIONS  (STANDING):  atorvastatin 10 milliGRAM(s) Oral at bedtime  lactated ringers. 1000 milliLiter(s) (100 mL/Hr) IV Continuous <Continuous>  melatonin 5 milliGRAM(s) Oral at bedtime  memantine 10 milliGRAM(s) Oral two times a day  pantoprazole    Tablet 40 milliGRAM(s) Oral before breakfast    MEDICATIONS  (PRN):  acetaminophen     Tablet .. 650 milliGRAM(s) Oral every 6 hours PRN Temp greater or equal to 38C (100.4F), Mild Pain (1 - 3)  HYDROmorphone  Injectable 0.5 milliGRAM(s) IV Push every 15 minutes PRN breakthrough pain  ibuprofen  Tablet. 400 milliGRAM(s) Oral every 6 hours PRN Moderate Pain (4 - 6)  oxyCODONE    IR 5 milliGRAM(s) Oral every 6 hours PRN Severe Pain (7 - 10)  oxyCODONE    IR 2.5 milliGRAM(s) Oral every 6 hours PRN Moderate Pain (4 - 6)      Allergies    Levaquin (Unknown)  lidocaine (Unknown)    Intolerances        SOCIAL HISTORY:    FAMILY HISTORY:   no FH leading to current infection    Vital Signs Last 24 Hrs  T(C): 36.6 (06 Mar 2024 11:10), Max: 36.9 (05 Mar 2024 18:00)  T(F): 97.9 (06 Mar 2024 11:10), Max: 98.5 (05 Mar 2024 18:00)  HR: 69 (06 Mar 2024 11:10) (60 - 72)  BP: 122/60 (06 Mar 2024 10:55) (111/69 - 147/87)  BP(mean): 84 (06 Mar 2024 10:55) (83 - 85)  RR: 12 (06 Mar 2024 11:10) (11 - 21)  SpO2: 100% (06 Mar 2024 11:10) (94% - 100%)    Parameters below as of 06 Mar 2024 11:10  Patient On (Oxygen Delivery Method): nasal cannula  O2 Flow (L/min): 2      03-05-24 @ 07:01  -  03-06-24 @ 07:00  --------------------------------------------------------  IN: 0 mL / OUT: 700 mL / NET: -700 mL        PHYSICAL EXAM:  Constitutional: alert, NAD  Eyes: the sclera and conjunctiva were normal.   ENT: the ears and nose were normal in appearance.   Neck: the appearance of the neck was normal and the neck was supple.   Pulmonary: no respiratory distress and lungs were clear to auscultation bilaterally.   Heart: heart rate was normal and rhythm regular, normal S1 and S2  Vascular:. there was no peripheral edema  Abdomen: normal bowel sounds, soft, non-tender  Neurological: no focal deficits.   Psychiatric: the affect was normal      LABS:                        11.6   8.12  )-----------( 172      ( 05 Mar 2024 18:42 )             36.8     03-05    140  |  101  |  14  ----------------------------<  110<H>  3.6   |  28  |  0.83    Ca    9.7      05 Mar 2024 20:19    TPro  6.9  /  Alb  4.2  /  TBili  0.7  /  DBili  x   /  AST  21  /  ALT  9<L>  /  AlkPhos  61  03-05    PT/INR - ( 05 Mar 2024 18:42 )   PT: 12.0 sec;   INR: 1.05          PTT - ( 05 Mar 2024 18:42 )  PTT:29.4 sec  Urinalysis Basic - ( 05 Mar 2024 20:19 )    Color: x / Appearance: x / SG: x / pH: x  Gluc: 110 mg/dL / Ketone: x  / Bili: x / Urobili: x   Blood: x / Protein: x / Nitrite: x   Leuk Esterase: x / RBC: x / WBC x   Sq Epi: x / Non Sq Epi: x / Bacteria: x        MICROBIOLOGY:    RADIOLOGY & ADDITIONAL STUDIES:   INFECTIOUS DISEASES INITIAL CONSULT NOTE    Patient is poor historian- most of HPI obtained from chart review     HPI:  74 year old woman with PMH osteoporosis, with suspected left neck abscess. ID consulted      Patient was recently admitted at [facility] 1/2024 for medical management of  facial and neck swelling. Her symptoms began in September 2023 after a dental extraction with subsequent left neck/floor of mouth abscess. She also received Prolia injection last year and have developed MRONJ.  CT neck at that time showed abnormal enhancement in the floor of mouth and the area was drained and washed out on 1/11. She is s/p 10 day course of Clindamycin but states that within the last month the infection has recurred and is worsening. Patient was seen in ENT clinic 3/5/24 for her symptoms and sent to [facility] for direct admission for presurgical workup of left neck abscess.        PAST MEDICAL & SURGICAL HISTORY:  IBS (irritable bowel syndrome)      Hashimoto's thyroiditis      Osteoporosis            Review of Systems:   Constitutional, eyes, ENT, cardiovascular, respiratory, gastrointestinal, genitourinary, integumentary, neurological, psychiatric and heme/lymph are otherwise negative other than noted above       ANTIBIOTICS:  MEDICATIONS  (STANDING):  atorvastatin 10 milliGRAM(s) Oral at bedtime  lactated ringers. 1000 milliLiter(s) (100 mL/Hr) IV Continuous <Continuous>  melatonin 5 milliGRAM(s) Oral at bedtime  memantine 10 milliGRAM(s) Oral two times a day  pantoprazole    Tablet 40 milliGRAM(s) Oral before breakfast    MEDICATIONS  (PRN):  acetaminophen     Tablet .. 650 milliGRAM(s) Oral every 6 hours PRN Temp greater or equal to 38C (100.4F), Mild Pain (1 - 3)  HYDROmorphone  Injectable 0.5 milliGRAM(s) IV Push every 15 minutes PRN breakthrough pain  ibuprofen  Tablet. 400 milliGRAM(s) Oral every 6 hours PRN Moderate Pain (4 - 6)  oxyCODONE    IR 5 milliGRAM(s) Oral every 6 hours PRN Severe Pain (7 - 10)  oxyCODONE    IR 2.5 milliGRAM(s) Oral every 6 hours PRN Moderate Pain (4 - 6)      Allergies    Levaquin (Unknown)  lidocaine (Unknown)    Intolerances        SOCIAL HISTORY:    FAMILY HISTORY:   no FH leading to current infection    Vital Signs Last 24 Hrs  T(C): 36.6 (06 Mar 2024 11:10), Max: 36.9 (05 Mar 2024 18:00)  T(F): 97.9 (06 Mar 2024 11:10), Max: 98.5 (05 Mar 2024 18:00)  HR: 69 (06 Mar 2024 11:10) (60 - 72)  BP: 122/60 (06 Mar 2024 10:55) (111/69 - 147/87)  BP(mean): 84 (06 Mar 2024 10:55) (83 - 85)  RR: 12 (06 Mar 2024 11:10) (11 - 21)  SpO2: 100% (06 Mar 2024 11:10) (94% - 100%)    Parameters below as of 06 Mar 2024 11:10  Patient On (Oxygen Delivery Method): nasal cannula  O2 Flow (L/min): 2      03-05-24 @ 07:01  -  03-06-24 @ 07:00  --------------------------------------------------------  IN: 0 mL / OUT: 700 mL / NET: -700 mL        PHYSICAL EXAM:  Constitutional: alert, NAD  Eyes: the sclera and conjunctiva were normal.   ENT: the ears and nose were normal in appearance.   Neck: the appearance of the neck was normal and the neck was supple.   Pulmonary: no respiratory distress and lungs were clear to auscultation bilaterally.   Heart: heart rate was normal and rhythm regular, normal S1 and S2  Vascular:. there was no peripheral edema  Abdomen: normal bowel sounds, soft, non-tender  Neurological: no focal deficits.   Psychiatric: the affect was normal      LABS:                        11.6   8.12  )-----------( 172      ( 05 Mar 2024 18:42 )             36.8     03-05    140  |  101  |  14  ----------------------------<  110<H>  3.6   |  28  |  0.83    Ca    9.7      05 Mar 2024 20:19    TPro  6.9  /  Alb  4.2  /  TBili  0.7  /  DBili  x   /  AST  21  /  ALT  9<L>  /  AlkPhos  61  03-05    PT/INR - ( 05 Mar 2024 18:42 )   PT: 12.0 sec;   INR: 1.05          PTT - ( 05 Mar 2024 18:42 )  PTT:29.4 sec  Urinalysis Basic - ( 05 Mar 2024 20:19 )    Color: x / Appearance: x / SG: x / pH: x  Gluc: 110 mg/dL / Ketone: x  / Bili: x / Urobili: x   Blood: x / Protein: x / Nitrite: x   Leuk Esterase: x / RBC: x / WBC x   Sq Epi: x / Non Sq Epi: x / Bacteria: x        MICROBIOLOGY:    RADIOLOGY & ADDITIONAL STUDIES:   INFECTIOUS DISEASES INITIAL CONSULT NOTE    Patient is poor historian- most of HPI obtained from chart review     HPI:  74 year old woman with PMH osteoporosis, with suspected left neck abscess. ID consulted for MRONJ.   She states facial/neck swelling started in September 2023 after a dental extraction with subsequent left neck/floor of mouth abscess. She also received Prolia injection last year and developed MRONJ. She was recently admitted at [facility] 1/2024 - CT showed abnormal enhancement in floor of mouth with findings c/f osteonecrosis. She underwent washout 1/11 - was treated with zosyn at [facility] and discharged with Clindamycin x 10 days. Abscess culture 1/12 grew strep intermedius, actinomyces and anaerobes. Patient's pain did not improve after discharge. She denies systemic symptoms PTA. She was seen by OMFS 3/5 and referred to ED for IV antibiotics and washout. Upon arrival, afebrile without leukocytosis. S/p     CT 3/5:  Interval marked improvement in the floor of the mouth abscess. However phlegmon along the anterior bilateral genioglossus geniohyoid and left mylohyoid muscles. Interval appearance of soft tissue phlegmon  surrounding the left parasymphysis and body of the mandible predominantly within the left submandibular space. Marked increased   osteomyelitis involving the symphysis and right and left parasymphysis and left body of the mandible with involvement of the inferior alveolar canal.  S/p OR washout and debridement 3/6. Cultures sent       PAST MEDICAL & SURGICAL HISTORY:  IBS (irritable bowel syndrome)      Hashimoto's thyroiditis      Osteoporosis            Review of Systems:   Constitutional, eyes, ENT, cardiovascular, respiratory, gastrointestinal, genitourinary, integumentary, neurological, psychiatric and heme/lymph are otherwise negative other than noted above       ANTIBIOTICS:  MEDICATIONS  (STANDING):  atorvastatin 10 milliGRAM(s) Oral at bedtime  lactated ringers. 1000 milliLiter(s) (100 mL/Hr) IV Continuous <Continuous>  melatonin 5 milliGRAM(s) Oral at bedtime  memantine 10 milliGRAM(s) Oral two times a day  pantoprazole    Tablet 40 milliGRAM(s) Oral before breakfast    MEDICATIONS  (PRN):  acetaminophen     Tablet .. 650 milliGRAM(s) Oral every 6 hours PRN Temp greater or equal to 38C (100.4F), Mild Pain (1 - 3)  HYDROmorphone  Injectable 0.5 milliGRAM(s) IV Push every 15 minutes PRN breakthrough pain  ibuprofen  Tablet. 400 milliGRAM(s) Oral every 6 hours PRN Moderate Pain (4 - 6)  oxyCODONE    IR 5 milliGRAM(s) Oral every 6 hours PRN Severe Pain (7 - 10)  oxyCODONE    IR 2.5 milliGRAM(s) Oral every 6 hours PRN Moderate Pain (4 - 6)      Allergies    Levaquin (Unknown) - does not recall reaction   lidocaine (Unknown)  Question of PCN allergy however patient tolerated Zosyn at [facility]     Intolerances        SOCIAL HISTORY:  -born in [place]   -lives on [address] with partner   -   -denies toxic habit   -denies pets   -denies recent travel     FAMILY HISTORY:   no FH leading to current infection    Vital Signs Last 24 Hrs  T(C): 36.6 (06 Mar 2024 11:10), Max: 36.9 (05 Mar 2024 18:00)  T(F): 97.9 (06 Mar 2024 11:10), Max: 98.5 (05 Mar 2024 18:00)  HR: 69 (06 Mar 2024 11:10) (60 - 72)  BP: 122/60 (06 Mar 2024 10:55) (111/69 - 147/87)  BP(mean): 84 (06 Mar 2024 10:55) (83 - 85)  RR: 12 (06 Mar 2024 11:10) (11 - 21)  SpO2: 100% (06 Mar 2024 11:10) (94% - 100%)    Parameters below as of 06 Mar 2024 11:10  Patient On (Oxygen Delivery Method): nasal cannula  O2 Flow (L/min): 2      03-05-24 @ 07:01  -  03-06-24 @ 07:00  --------------------------------------------------------  IN: 0 mL / OUT: 700 mL / NET: -700 mL        PHYSICAL EXAM:  Constitutional: alert, NAD  Eyes: the sclera and conjunctiva were normal.   ENT: the ears and nose were normal in appearance.   L Jaw/Neck dressing c/d/i  Neck: the appearance of the neck was normal and the neck was supple.   Pulmonary: no respiratory distress and lungs were clear to auscultation bilaterally.   Heart: heart rate was normal and rhythm regular, normal S1 and S2  Vascular:. there was no peripheral edema  Abdomen: normal bowel sounds, soft, non-tender  Neurological: no focal deficits.   Psychiatric: the affect was normal      LABS:                        11.6   8.12  )-----------( 172      ( 05 Mar 2024 18:42 )             36.8     03-05    140  |  101  |  14  ----------------------------<  110<H>  3.6   |  28  |  0.83    Ca    9.7      05 Mar 2024 20:19    TPro  6.9  /  Alb  4.2  /  TBili  0.7  /  DBili  x   /  AST  21  /  ALT  9<L>  /  AlkPhos  61  03-05    PT/INR - ( 05 Mar 2024 18:42 )   PT: 12.0 sec;   INR: 1.05          PTT - ( 05 Mar 2024 18:42 )  PTT:29.4 sec  Urinalysis Basic - ( 05 Mar 2024 20:19 )    Color: x / Appearance: x / SG: x / pH: x  Gluc: 110 mg/dL / Ketone: x  / Bili: x / Urobili: x   Blood: x / Protein: x / Nitrite: x   Leuk Esterase: x / RBC: x / WBC x   Sq Epi: x / Non Sq Epi: x / Bacteria: x        MICROBIOLOGY:  reviewed  RADIOLOGY & ADDITIONAL STUDIES:  reviewed

## 2024-03-06 NOTE — BRIEF OPERATIVE NOTE - NSICDXBRIEFPROCEDURE_GEN_ALL_CORE_FT
PROCEDURES:  Incision and drainage, neck 06-Mar-2024 09:56:30  Rasheeda Goff  Debridement of mandible 06-Mar-2024 09:56:39  Rasheeda Goff

## 2024-03-06 NOTE — BRIEF OPERATIVE NOTE - OPERATION/FINDINGS
transcervical I&D along inferior border of mandible on L; purulence expressed and sent for culture, wound bed irrigated. penrose left in place  intraoral debridement performed, bone thin and friable, sent for culture transcervical I&D along inferior border of mandible on L; purulence expressed and sent for culture, wound bed irrigated. penrose left in place  intraoral mandibular debridement then performed, bone thin and friable, sent for culture

## 2024-03-06 NOTE — CONSULT NOTE ADULT - NS ATTEND AMEND GEN_ALL_CORE FT
74M h/o osteoporosis s/p Denosumab c/b osteonecrosis of jaw, submandibular abscess s/p I&D x 2 p/w L submandibular abscess.  Patient is a poor historian and most of the history obtained from chart review.  She had dental extraction in 9/2023 which was c/b abscess of floor of mouth/L neck area. She had I&D as outpatient in 11/2023 with PO abx.  She was admitted from 1/10-1/14 at [facility] for recurrent abscess.  CT scan showed peripherally enhancing nercoric mass in the R anterior floor the mouth and bony erosive and sclerotic change involving the lingual cortex of the L mandibular body.She had I&D by [facility] and culture grew Strep intermedius, actinomyces and Prevotella spp.  She was treated with IV abx and was sent home with Augmentin 7 day course. She followed up with [facility] on 3/5, who sent her to ED for IV abx and surgery.  Patient denied fever/chills, n/v/d, abdominal pain. Reports persistent L mandibular swelling and pain.  Upon arrival, VSS, lab notable for WBC 8, Cr 0.9. CT neck showed improvement of floor of the mouth abscess, marked increased OM involving the symphysis and R and L parasymphysis and L body of the mandible.  Underwent I&D of mandible, was found to have purulence, intraoral mandibular debridement performed - bone thin and friable, and sent for culture. ID was consulted for abx rec.  This is c/w OM of mandible due to osteonecrosis.  She will need 6 weeks of IV abx.  Start IV unasyn 3g q6h.  f/u OR culture - will adjust abx based on culture.      Team 2 will follow you.  Case d/w primary team.    Lidia Pugh MD, MS  Infectious Disease attending  office phone [phone]  For any questions during evening/weekend/holiday, please page ID on call

## 2024-03-06 NOTE — PROGRESS NOTE ADULT - SUBJECTIVE AND OBJECTIVE BOX
OTOLARYNGOLOGY (ENT) PROGRESS NOTE    PATIENT: FILIPPO VIERA  MRN: [id number]  : [birth year]  [id number]  DATE OF SERVICE:  24  			         ID:FILIPPO VIERA is a  74yFemale with  FOM abscess 3/5     Subjective/ Interval:   3/6: Afebrile overnight, plain controlled, patient intermittently confused, plan for OR today   ALLERGIES:  Levaquin (Unknown)  lidocaine (Unknown)      MEDICATIONS:  Antiinfectives:     IV fluids:  dextrose 5% + sodium chloride 0.45%. 1000 milliLiter(s) IV Continuous <Continuous>    Hematologic/Anticoagulation:  enoxaparin Injectable 40 milliGRAM(s) SubCutaneous every 24 hours    Pain medications/Neuro:  acetaminophen     Tablet .. 650 milliGRAM(s) Oral every 6 hours PRN  ibuprofen  Tablet. 400 milliGRAM(s) Oral every 6 hours PRN  melatonin 5 milliGRAM(s) Oral at bedtime  memantine 10 milliGRAM(s) Oral two times a day  oxyCODONE    IR 5 milliGRAM(s) Oral every 4 hours PRN    Endocrine Medications:   atorvastatin 10 milliGRAM(s) Oral at bedtime    All other standing medications:   pantoprazole    Tablet 40 milliGRAM(s) Oral before breakfast    All other PRN medications:    Vital Signs Last 24 Hrs  T(C): 36.3 (06 Mar 2024 05:20), Max: 36.9 (05 Mar 2024 18:00)  T(F): 97.4 (06 Mar 2024 05:20), Max: 98.5 (05 Mar 2024 18:00)  HR: 60 (06 Mar 2024 05:20) (60 - 72)  BP: 133/82 (06 Mar 2024 05:20) (111/69 - 147/87)  BP(mean): --  RR: 17 (06 Mar 2024 05:20) (16 - 17)  SpO2: 99% (06 Mar 2024 05:20) (94% - 99%)    Parameters below as of 06 Mar 2024 05:20  Patient On (Oxygen Delivery Method): room air          - @ 07:01  -   @ 07:00  --------------------------------------------------------  IN:  Total IN: 0 mL    OUT:    Voided (mL): 700 mL  Total OUT: 700 mL    Total NET: -700 mL        PHYSICAL EXAM:  GEN: appears stated age, NAD  NEURO: awake, poor memory   HEENT:    Head: NC/AT  Face: symmetric facial movements bilaterally, intact CN VII bilaterally  Eyes: EOMI  Nose; Clear to anterior rhinoscopy  Ears: Auricles well formed, no mastoid tip tenderness   Oral cavity/oropharynx: moist mucous membranes, left sided exposed bone of mandible on lingual surface, floor of mouth tender to palpation on left side, not raised. Symmetric elevation of palate, posterior oropharynx visualized  Neck: Left level 1a/b fullness with tenderness to palpation most prominent left submental. Neck is soft. Full Range of motion of neck  CVS: regular rate and rhythm  Pulm: normal respiratory excursions, not tachypneic, no labored breathing  Abd: non-distended  Ext: moving all four extremities, no peripheral edema noted       LABS                       11.6   8.12  )-----------( 172      ( 05 Mar 2024 18:42 )             36.8        140  |  101  |  14  ----------------------------<  110<H>  3.6   |  28  |  0.83    Ca    9.7      05 Mar 2024 20:19    TPro  6.9  /  Alb  4.2  /  TBili  0.7  /  DBili  x   /  AST  21  /  ALT  9<L>  /  AlkPhos  61           Coagulation Studies-   PT/INR - ( 05 Mar 2024 18:42 )   PT: 12.0 sec;   INR: 1.05          PTT - ( 05 Mar 2024 18:42 )  PTT:29.4 sec  Urinalysis Basic - ( 05 Mar 2024 20:19 )    Color: x / Appearance: x / SG: x / pH: x  Gluc: 110 mg/dL / Ketone: x  / Bili: x / Urobili: x   Blood: x / Protein: x / Nitrite: x   Leuk Esterase: x / RBC: x / WBC x   Sq Epi: x / Non Sq Epi: x / Bacteria: x      Endocrine Panel-  Calcium: 9.7 mg/dL ( @ 20:19)  Calcium: 10.2 mg/dL ( @ 18:42)

## 2024-03-07 ENCOUNTER — TRANSCRIPTION ENCOUNTER (OUTPATIENT)
Age: 74
End: 2024-03-07

## 2024-03-07 LAB
ALBUMIN SERPL ELPH-MCNC: 3.6 G/DL — SIGNIFICANT CHANGE UP (ref 3.3–5)
ALP SERPL-CCNC: 46 U/L — SIGNIFICANT CHANGE UP (ref 40–120)
ALT FLD-CCNC: 6 U/L — LOW (ref 10–45)
ANION GAP SERPL CALC-SCNC: 5 MMOL/L — SIGNIFICANT CHANGE UP (ref 5–17)
AST SERPL-CCNC: 16 U/L — SIGNIFICANT CHANGE UP (ref 10–40)
BILIRUB SERPL-MCNC: 0.5 MG/DL — SIGNIFICANT CHANGE UP (ref 0.2–1.2)
BUN SERPL-MCNC: 10 MG/DL — SIGNIFICANT CHANGE UP (ref 7–23)
CALCIUM SERPL-MCNC: 8.7 MG/DL — SIGNIFICANT CHANGE UP (ref 8.4–10.5)
CHLORIDE SERPL-SCNC: 105 MMOL/L — SIGNIFICANT CHANGE UP (ref 96–108)
CO2 SERPL-SCNC: 28 MMOL/L — SIGNIFICANT CHANGE UP (ref 22–31)
CREAT SERPL-MCNC: 0.88 MG/DL — SIGNIFICANT CHANGE UP (ref 0.5–1.3)
EGFR: 69 ML/MIN/1.73M2 — SIGNIFICANT CHANGE UP
GLUCOSE SERPL-MCNC: 115 MG/DL — HIGH (ref 70–99)
HCT VFR BLD CALC: 28.6 % — LOW (ref 34.5–45)
HGB BLD-MCNC: 9.2 G/DL — LOW (ref 11.5–15.5)
MCHC RBC-ENTMCNC: 28 PG — SIGNIFICANT CHANGE UP (ref 27–34)
MCHC RBC-ENTMCNC: 32.2 GM/DL — SIGNIFICANT CHANGE UP (ref 32–36)
MCV RBC AUTO: 86.9 FL — SIGNIFICANT CHANGE UP (ref 80–100)
NRBC # BLD: 0 /100 WBCS — SIGNIFICANT CHANGE UP (ref 0–0)
PLATELET # BLD AUTO: 138 K/UL — LOW (ref 150–400)
POTASSIUM SERPL-MCNC: 3.6 MMOL/L — SIGNIFICANT CHANGE UP (ref 3.5–5.3)
POTASSIUM SERPL-SCNC: 3.6 MMOL/L — SIGNIFICANT CHANGE UP (ref 3.5–5.3)
PROT SERPL-MCNC: 5.9 G/DL — LOW (ref 6–8.3)
RBC # BLD: 3.29 M/UL — LOW (ref 3.8–5.2)
RBC # FLD: 14.6 % — HIGH (ref 10.3–14.5)
SODIUM SERPL-SCNC: 138 MMOL/L — SIGNIFICANT CHANGE UP (ref 135–145)
WBC # BLD: 6.96 K/UL — SIGNIFICANT CHANGE UP (ref 3.8–10.5)
WBC # FLD AUTO: 6.96 K/UL — SIGNIFICANT CHANGE UP (ref 3.8–10.5)

## 2024-03-07 PROCEDURE — 99232 SBSQ HOSP IP/OBS MODERATE 35: CPT

## 2024-03-07 RX ORDER — VITAMIN E 100 UNIT
200 CAPSULE ORAL DAILY
Refills: 0 | Status: DISCONTINUED | OUTPATIENT
Start: 2024-03-07 | End: 2024-03-12

## 2024-03-07 RX ORDER — VITAMIN E 100 UNIT
800 CAPSULE ORAL DAILY
Refills: 0 | Status: DISCONTINUED | OUTPATIENT
Start: 2024-03-07 | End: 2024-03-12

## 2024-03-07 RX ADMIN — OXYCODONE HYDROCHLORIDE 5 MILLIGRAM(S): 5 TABLET ORAL at 14:11

## 2024-03-07 RX ADMIN — MEMANTINE HYDROCHLORIDE 10 MILLIGRAM(S): 10 TABLET ORAL at 17:40

## 2024-03-07 RX ADMIN — AMPICILLIN SODIUM AND SULBACTAM SODIUM 200 GRAM(S): 250; 125 INJECTION, POWDER, FOR SUSPENSION INTRAMUSCULAR; INTRAVENOUS at 00:01

## 2024-03-07 RX ADMIN — AMPICILLIN SODIUM AND SULBACTAM SODIUM 200 GRAM(S): 250; 125 INJECTION, POWDER, FOR SUSPENSION INTRAMUSCULAR; INTRAVENOUS at 11:35

## 2024-03-07 RX ADMIN — Medication 800 INTERNATIONAL UNIT(S): at 17:40

## 2024-03-07 RX ADMIN — Medication 650 MILLIGRAM(S): at 14:32

## 2024-03-07 RX ADMIN — Medication 400 MILLIGRAM(S): at 06:08

## 2024-03-07 RX ADMIN — AMPICILLIN SODIUM AND SULBACTAM SODIUM 200 GRAM(S): 250; 125 INJECTION, POWDER, FOR SUSPENSION INTRAMUSCULAR; INTRAVENOUS at 17:40

## 2024-03-07 RX ADMIN — ATORVASTATIN CALCIUM 10 MILLIGRAM(S): 80 TABLET, FILM COATED ORAL at 21:52

## 2024-03-07 RX ADMIN — OXYCODONE HYDROCHLORIDE 5 MILLIGRAM(S): 5 TABLET ORAL at 00:50

## 2024-03-07 RX ADMIN — Medication 400 MILLIGRAM(S): at 17:41

## 2024-03-07 RX ADMIN — Medication 650 MILLIGRAM(S): at 15:32

## 2024-03-07 RX ADMIN — AMPICILLIN SODIUM AND SULBACTAM SODIUM 200 GRAM(S): 250; 125 INJECTION, POWDER, FOR SUSPENSION INTRAMUSCULAR; INTRAVENOUS at 06:09

## 2024-03-07 RX ADMIN — OXYCODONE HYDROCHLORIDE 5 MILLIGRAM(S): 5 TABLET ORAL at 19:14

## 2024-03-07 RX ADMIN — OXYCODONE HYDROCHLORIDE 5 MILLIGRAM(S): 5 TABLET ORAL at 13:11

## 2024-03-07 RX ADMIN — AMPICILLIN SODIUM AND SULBACTAM SODIUM 200 GRAM(S): 250; 125 INJECTION, POWDER, FOR SUSPENSION INTRAMUSCULAR; INTRAVENOUS at 23:45

## 2024-03-07 RX ADMIN — PANTOPRAZOLE SODIUM 40 MILLIGRAM(S): 20 TABLET, DELAYED RELEASE ORAL at 06:09

## 2024-03-07 RX ADMIN — Medication 200 INTERNATIONAL UNIT(S): at 18:28

## 2024-03-07 RX ADMIN — Medication 5 MILLIGRAM(S): at 23:45

## 2024-03-07 RX ADMIN — MEMANTINE HYDROCHLORIDE 10 MILLIGRAM(S): 10 TABLET ORAL at 06:08

## 2024-03-07 NOTE — DISCHARGE NOTE NURSING/CASE MANAGEMENT/SOCIAL WORK - NSDCPEFALRISK_GEN_ALL_CORE
For information on Fall & Injury Prevention, visit: [email or web address] OR  [email or web address] OR  https://www.cdc.gov/steadi/patient.html

## 2024-03-07 NOTE — DISCHARGE NOTE NURSING/CASE MANAGEMENT/SOCIAL WORK - PATIENT PORTAL LINK FT
You can access the FollowMyHealth Patient Portal offered by [facility] by registering at the following website: [email or web address]. By joining [employer]’s FollowMyHealth portal, you will also be able to view your health information using other applications (apps) compatible with our system.

## 2024-03-07 NOTE — PROGRESS NOTE ADULT - SUBJECTIVE AND OBJECTIVE BOX
OTOLARYNGOLOGY (ENT) PROGRESS NOTE    PATIENT: FILIPPO VIERA  MRN: [id number]  : 52  [id number]  DATE OF SERVICE:  24  			         ID:FILIPPO VIERA is a 74F FOM abscess & ORN of jaw s/p L neck I&D & intraoral mandibular debridement 3/6.     Subjective/ Interval:   3/7: patient seen this morning,  intraoral sutures intact, penrose with minimal drainage . Pain starting to improve. Cx growing GPC and GNR. ID recommends 6 weeks of unasyn which she tolerated without issue.  ALLERGIES:  Levaquin (Unknown)  lidocaine (Unknown)      MEDICATIONS:  Antiinfectives:   ampicillin/sulbactam  IVPB      ampicillin/sulbactam  IVPB 3 Gram(s) IV Intermittent every 6 hours    IV fluids:  lactated ringers. 1000 milliLiter(s) IV Continuous <Continuous>  vitamin E 1000 International Unit(s) Oral daily    Hematologic/Anticoagulation:  pentoxifylline 400 milliGRAM(s) Oral every 12 hours    Pain medications/Neuro:  acetaminophen     Tablet .. 650 milliGRAM(s) Oral every 6 hours PRN  HYDROmorphone  Injectable 0.5 milliGRAM(s) IV Push every 15 minutes PRN  ibuprofen  Tablet. 400 milliGRAM(s) Oral every 6 hours PRN  melatonin 5 milliGRAM(s) Oral at bedtime  memantine 10 milliGRAM(s) Oral two times a day  oxyCODONE    IR 2.5 milliGRAM(s) Oral every 6 hours PRN  oxyCODONE    IR 5 milliGRAM(s) Oral every 6 hours PRN    Endocrine Medications:   atorvastatin 10 milliGRAM(s) Oral at bedtime    All other standing medications:   pantoprazole    Tablet 40 milliGRAM(s) Oral before breakfast    All other PRN medications:    Vital Signs Last 24 Hrs  T(C): 36.7 (07 Mar 2024 04:50), Max: 36.8 (06 Mar 2024 23:31)  T(F): 98 (07 Mar 2024 04:50), Max: 98.3 (06 Mar 2024 23:31)  HR: 79 (07 Mar 2024 04:50) (60 - 94)  BP: 95/59 (07 Mar 2024 04:50) (95/59 - 136/74)  BP(mean): 80 (06 Mar 2024 11:10) (80 - 85)  RR: 17 (07 Mar 2024 04:50) (11 - 21)  SpO2: 97% (07 Mar 2024 04:50) (96% - 100%)    Parameters below as of 07 Mar 2024 04:50  Patient On (Oxygen Delivery Method): room air          - @ 07:01  -  - @ 07:00  --------------------------------------------------------  IN:    Lactated Ringers: 1200 mL  Total IN: 1200 mL    OUT:    Voided (mL): 1950 mL  Total OUT: 1950 mL    Total NET: -750 mL          PHYSICAL EXAM:  GEN: appears stated age, NAD  NEURO: awake, poor memory   HEENT:    Head: NC/AT  Face: symmetric facial movements bilaterally, intact CN VII bilaterally  Eyes: EOMI  Nose; Clear to anterior rhinoscopy  Ears: Auricles well formed, no mastoid tip tenderness   Oral cavity/oropharynx: moist mucous membranes, intraoral sutures intact floor of mouth tender to palpation on left side, not raised. Symmetric elevation of palate, posterior oropharynx visualized  Neck: penrose with SS output,  tenderness to palpation most prominent left submental. Neck is soft. Full Range of motion of neck  CVS: regular rate and rhythm  Pulm: normal respiratory excursions, not tachypneic, no labored breathing  Abd: non-distended  Ext: moving all four extremities, no peripheral edema noted         LABS                       11.6   8.12  )-----------( 172      ( 05 Mar 2024 18:42 )             36.8    03-05    140  |  101  |  14  ----------------------------<  110<H>  3.6   |  28  |  0.83    Ca    9.7      05 Mar 2024 20:19    TPro  6.9  /  Alb  4.2  /  TBili  0.7  /  DBili  x   /  AST  21  /  ALT  9<L>  /  AlkPhos  61  03-05         Coagulation Studies-   PT/INR - ( 05 Mar 2024 18:42 )   PT: 12.0 sec;   INR: 1.05          PTT - ( 05 Mar 2024 18:42 )  PTT:29.4 sec  Urinalysis Basic - ( 05 Mar 2024 20:19 )    Color: x / Appearance: x / SG: x / pH: x  Gluc: 110 mg/dL / Ketone: x  / Bili: x / Urobili: x   Blood: x / Protein: x / Nitrite: x   Leuk Esterase: x / RBC: x / WBC x   Sq Epi: x / Non Sq Epi: x / Bacteria: x      Endocrine Panel-                MICROBIOLOGY:  Culture - Surgical Swab (collected 24 @ 09:10)  Source: .Surgical Swab #2-INRTA ORAL CULTURE  Gram Stain (24 @ 16:43):    Few Gram positive cocci in pairs    Few Gram Negative Rods    Few White blood cells    Culture - Surgical Swab (collected 24 @ 09:10)  Source: .Surgical Swab #1-ANTERIOR MANDIBLE CULTURE  Gram Stain (24 @ 16:44):    Rare Gram positive cocci in pairs    Moderate White blood cells          Culture - Acid Fast - Other w/Smear (collected 24 @ 09:10)  Source: .Other AFB-#2-INRTA ORAL CULTURE    Culture - Surgical Swab (collected 24 @ 09:10)  Source: .Surgical Swab #2-INRTA ORAL CULTURE  Gram Stain (24 @ 16:43):    Few Gram positive cocci in pairs    Few Gram Negative Rods    Few White blood cells    Culture - Acid Fast - Other w/Smear (collected 24 @ 09:10)  Source: .Other AFB-#1-ANTERIOR MANDIBLE CULTURE    Culture - Surgical Swab (collected 24 @ 09:10)  Source: .Surgical Swab #1-ANTERIOR MANDIBLE CULTURE  Gram Stain (24 @ 16:44):    Rare Gram positive cocci in pairs    Moderate White blood cells

## 2024-03-07 NOTE — DISCHARGE NOTE NURSING/CASE MANAGEMENT/SOCIAL WORK - NSFLUVACAGEDISCH_IMM_ALL_CORE
[place] ambulatory encounter  URGENT CARE OFFICE VISIT    SUBJECTIVE:  Brigid Jimenez is a 77 year old female who presented requesting evaluation for cough.    Patient presents with complaint of 4 days of symptoms, most notably a wet sounding cough and chest congestion with inability to produce any sputum.  She also indicates shortness of breath both with coughing jags as well as with exertion, walking up stairs or walking short distances.  Patient does indicate a smoking history significant for 1-2 packs per day for approximately 20-25 years.    This is a: new problem  Onset / duration: 2 days  Aggravating factors: None  Relieving factors: None  Treatments include: OTC cough syrup with DM, Mucinex      Review of systems:   Review of Systems   Constitutional: Negative for chills, fever and weight loss.   HENT: Positive for sore throat (Irritated from coughing fits). Negative for congestion and sinus pain.    Respiratory: Positive for cough and shortness of breath. Negative for sputum production (Chest congestion with wet sounding cough).    Gastrointestinal: Negative for diarrhea, nausea and vomiting (Denies posttussive gagging or emesis).   Musculoskeletal: Negative for back pain, myalgias and neck pain.   Neurological: Negative for dizziness, tingling, sensory change and headaches.       PAST HISTORIES:    ALLERGIES:  No Known Allergies    MEDICATIONS:  Current Outpatient Medications   Medication Sig   • sotalol (BETAPACE) 80 MG tablet Take 1 tablet by mouth 2 times daily.   • warfarin (COUMADIN) 2.5 MG tablet Take 2 tablets daily or as directed   • Naproxen Sod-Diphenhydramine (ALEVE PM PO) Take by mouth nightly.   • fluticasone (FLONASE) 50 MCG/ACT nasal spray Spray 1 spray in each nostril daily.   • calcium carbonate-vitamin D (CALTRATE+D) 600-400 MG-UNIT per tablet Take 1 tablet by mouth daily.   • cholecalciferol (VITAMIN D3) 1000 UNITS tablet Take 1,000 Units by mouth daily.   • Multiple Vitamins-Minerals  (MULTIVITAMIN ADULT PO)    • FIBER DIET PO Take 1 tablet by mouth nightly. Fiber choice   • predniSONE (DELTASONE) 20 MG tablet Take 2 tablets by mouth daily for 5 days.   • doxycycline monohydrate (MONODOX) 100 MG capsule Take 1 capsule by mouth 2 times daily for 7 days.   • albuterol 108 (90 Base) MCG/ACT inhaler Inhale 2 puffs into the lungs every 4 hours as needed for Shortness of Breath or Wheezing.     No current facility-administered medications for this visit.        Past Medical History:   Diagnosis Date   • Arthritis     KNEES R>L   • Cholelithiasis    • Left knee pain    • Wears glasses     READERS       OBJECTIVE:  Physical Exam:    Visit Vitals  Pulse 80   Temp 97.9 °F (36.6 °C) (Oral)   Resp 20   Wt 76 kg   SpO2 96%   BMI 31.66 kg/m²        Physical Exam   Constitutional:   Appears stated age, euvolemic, nontoxic   HENT:   Head: Normocephalic.   Mouth/Throat: Oropharynx is clear and moist. No oropharyngeal exudate.   External canals and TMs unremarkable bilateral, posterior oropharynx unremarkable   Eyes: Conjunctivae and EOM are normal. Pupils are equal, round, and reactive to light. No scleral icterus.   Cardiovascular:   Heart sounds distant, S1 and S2 appreciated, no rubs murmurs or gallops noted on this exam   Pulmonary/Chest: Effort normal. She has no wheezes. She has no rales.   Increased AP diameter, breath sounds distant, no adventitious sounds appreciated on exam   Musculoskeletal: Normal range of motion. She exhibits no edema, tenderness or deformity.   Skin: Skin is warm and dry. No rash noted.   Nursing note and vitals reviewed.      DIAGNOSTICS:  None    URGENT CARE COURSE:  Patient presents with 4 days of symptoms, diagnosed with subclinical COPD exacerbation, treated for same, plan noted below, patient discharged      Assessment / Plan:  1. COPD exacerbation (CMS/[facility])  History and presentation consistent with a subclinical COPD exacerbation, given patient's symptoms duration and  characteristics as well as significant smoking history a clinical diagnosis is appropriate, patient treated for same, plan noted below    Orders Placed This Encounter   • predniSONE (DELTASONE) 20 MG tablet   • doxycycline monohydrate (MONODOX) 100 MG capsule   • albuterol 108 (90 Base) MCG/ACT inhaler     Recommend patient take medications as ordered, prednisone complete 5 day course, doxycycline to complete a 10 day course, albuterol 2 puffs every 6 hours while symptomatic, recommend close follow-up with PCP    FOLLOW-UP:  The patient was advised to follow up with primary physician or to recheck with the urgent care clinic sooner if symptoms get worse or if new symptoms appear.    PATIENT INSTRUCTIONS:  AVS printed, reviewed with patient, questions answered, patient discharged to home       Anoop Gaming MD   4:00 PM 03/23/19    Adult

## 2024-03-07 NOTE — PROGRESS NOTE ADULT - ASSESSMENT
74 year old woman with PMH osteoporosis, with suspected left neck abscess found to have MRONJ s/p OR washout and debridement 3/6. Abscess culture from prior washout 1/11 grew strep intermedius, actinomyces and anaerobes. She is afebrile.   OR cultures 3/6:  Intra oral culture with GPC in pairs and GNR on gram stain; culture in progress.   Mandible culture with GPC in pairs on gram stain; culture in progress     Suggest:  -check CBC and CMP for antibiotic safety monitoring   -f/u OR cultures 3/6  -continue Unasyn 3g IV Q6hrs   -anticipate 6 weeks of IV antibiotics from date of OR washout    Team 2 will follow you.    Case d/w primary team.  Final recommendation pending attending note.    Lanette Mane, Infectious Diseases PA  Please reach out for any questions 9 am-5pm. For evenings and weekends, please call the ID physician on call.  Work cell: [phone]

## 2024-03-07 NOTE — PROGRESS NOTE ADULT - SUBJECTIVE AND OBJECTIVE BOX
INFECTIOUS DISEASES CONSULT FOLLOW-UP NOTE    INTERVAL HPI/OVERNIGHT EVENTS:    Patient seen and examined at bedside. MARQUES. Afebrile       ROS:   Constitutional, eyes, ENT, cardiovascular, respiratory, gastrointestinal, genitourinary, integumentary, neurological, psychiatric and heme/lymph are otherwise negative other than noted above       ANTIBIOTICS/RELEVANT:    MEDICATIONS  (STANDING):  ampicillin/sulbactam  IVPB      ampicillin/sulbactam  IVPB 3 Gram(s) IV Intermittent every 6 hours  atorvastatin 10 milliGRAM(s) Oral at bedtime  melatonin 5 milliGRAM(s) Oral at bedtime  memantine 10 milliGRAM(s) Oral two times a day  pantoprazole    Tablet 40 milliGRAM(s) Oral before breakfast  pentoxifylline 400 milliGRAM(s) Oral every 12 hours  vitamin E 1000 International Unit(s) Oral daily    MEDICATIONS  (PRN):  acetaminophen     Tablet .. 650 milliGRAM(s) Oral every 6 hours PRN Temp greater or equal to 38C (100.4F), Mild Pain (1 - 3)  HYDROmorphone  Injectable 0.5 milliGRAM(s) IV Push every 15 minutes PRN breakthrough pain  ibuprofen  Tablet. 400 milliGRAM(s) Oral every 6 hours PRN Moderate Pain (4 - 6)  oxyCODONE    IR 2.5 milliGRAM(s) Oral every 6 hours PRN Moderate Pain (4 - 6)  oxyCODONE    IR 5 milliGRAM(s) Oral every 6 hours PRN Severe Pain (7 - 10)        Vital Signs Last 24 Hrs  T(C): 36.7 (07 Mar 2024 12:46), Max: 36.8 (06 Mar 2024 23:31)  T(F): 98 (07 Mar 2024 12:46), Max: 98.3 (06 Mar 2024 23:31)  HR: 68 (07 Mar 2024 12:46) (60 - 94)  BP: 99/57 (07 Mar 2024 12:46) (95/59 - 136/74)  BP(mean): --  RR: 17 (07 Mar 2024 12:46) (16 - 18)  SpO2: 98% (07 Mar 2024 12:46) (96% - 98%)    Parameters below as of 07 Mar 2024 12:46  Patient On (Oxygen Delivery Method): room air        03-06-24 @ 07:01 - 03-07-24 @ 07:00  --------------------------------------------------------  IN: 1200 mL / OUT: 1950 mL / NET: -750 mL    03-07-24 @ 07:01  -  03-07-24 @ 13:48  --------------------------------------------------------  IN: 200 mL / OUT: 525 mL / NET: -325 mL      PHYSICAL EXAM:  Constitutional: alert, NAD  Eyes: the sclera and conjunctiva were normal.   ENT: the ears and nose were normal in appearance.   L Jaw/Neck dressing c/d/i. +penrose   Neck: the appearance of the neck was normal and the neck was supple.   Pulmonary: no respiratory distress and lungs were clear to auscultation bilaterally.   Heart: heart rate was normal and rhythm regular, normal S1 and S2  Vascular:. there was no peripheral edema  Abdomen: normal bowel sounds, soft, non-tender  Neurological: no focal deficits.   Psychiatric: the affect was normal        LABS:                        11.6   8.12  )-----------( 172      ( 05 Mar 2024 18:42 )             36.8     03-05    140  |  101  |  14  ----------------------------<  110<H>  3.6   |  28  |  0.83    Ca    9.7      05 Mar 2024 20:19    TPro  6.9  /  Alb  4.2  /  TBili  0.7  /  DBili  x   /  AST  21  /  ALT  9<L>  /  AlkPhos  61  03-05    PT/INR - ( 05 Mar 2024 18:42 )   PT: 12.0 sec;   INR: 1.05          PTT - ( 05 Mar 2024 18:42 )  PTT:29.4 sec  Urinalysis Basic - ( 05 Mar 2024 20:19 )    Color: x / Appearance: x / SG: x / pH: x  Gluc: 110 mg/dL / Ketone: x  / Bili: x / Urobili: x   Blood: x / Protein: x / Nitrite: x   Leuk Esterase: x / RBC: x / WBC x   Sq Epi: x / Non Sq Epi: x / Bacteria: x        MICROBIOLOGY:    Culture - Fungal, Other (collected 03-06-24 @ 09:10)  Source: .Other FUNGAL-#2-INRTA ORAL CULTURE  Preliminary Report (03-07-24 @ 08:14):    Testing in progress    Culture - Acid Fast - Other w/Smear (collected 03-06-24 @ 09:10)  Source: .Other AFB-#2-INRTA ORAL CULTURE    Culture - Surgical Swab (collected 03-06-24 @ 09:10)  Source: .Surgical Swab #2-INRTA ORAL CULTURE  Gram Stain (03-06-24 @ 16:43):    Few Gram positive cocci in pairs    Few Gram Negative Rods    Few White blood cells  Preliminary Report (03-07-24 @ 10:56):    Culture in progress    Culture - Fungal, Other (collected 03-06-24 @ 09:10)  Source: .Other FUNGAL-#1-ANTERIOR MANDIBLE CULTURE  Preliminary Report (03-07-24 @ 08:14):    Testing in progress    Culture - Acid Fast - Other w/Smear (collected 03-06-24 @ 09:10)  Source: .Other AFB-#1-ANTERIOR MANDIBLE CULTURE    Culture - Surgical Swab (collected 03-06-24 @ 09:10)  Source: .Surgical Swab #1-ANTERIOR MANDIBLE CULTURE  Gram Stain (03-06-24 @ 16:44):    Rare Gram positive cocci in pairs    Moderate White blood cells  Preliminary Report (03-07-24 @ 08:42):    No growth to date        RADIOLOGY & ADDITIONAL STUDIES:  Reviewed

## 2024-03-07 NOTE — PROGRESS NOTE ADULT - ASSESSMENT
Assessment and Plan:  FILIPPO VIERA is a  74F FOM abscess & ORN of jaw s/p L neck I&D & intraoral mandibular debridement 3/6. ID on board, patient will require a PICC and at least 6 weeks of IV abx.     PLAN:  - follow up with ID in regards to PICC lumen   - f/u OR culture results   - Pain control   - Monitor Penrose   [facility] at [phone] with any questions/concerns.    Hyacinth Miller PA-C  03-07-24 @ 07:51

## 2024-03-07 NOTE — PROGRESS NOTE ADULT - NS ATTEND AMEND GEN_ALL_CORE FT
No event overnight.  Patient reports pain at L mandibular area.  No other symptoms.  Mandible culture ngtd.  Intra oral culture growing oral srini - including Rothia mucilaginosa, Neisseria sicca, Strep mitis oralis, Strep parasanguinis, H flu.   Patient will need 6 weeks of IV abx to treat OM of mandible.  Choice of abx TBD based on mandible culture.  COnt unasyn 3g IV q6h.  f/u mandible culture.      Team 2 will follow you.  Case d/w primary team.    Lidia Pugh MD, MS  Infectious Disease attending  office phone [phone]  For any questions during evening/weekend/holiday, please page ID on call

## 2024-03-08 LAB
ANION GAP SERPL CALC-SCNC: 7 MMOL/L — SIGNIFICANT CHANGE UP (ref 5–17)
BUN SERPL-MCNC: 10 MG/DL — SIGNIFICANT CHANGE UP (ref 7–23)
CALCIUM SERPL-MCNC: 8.5 MG/DL — SIGNIFICANT CHANGE UP (ref 8.4–10.5)
CHLORIDE SERPL-SCNC: 106 MMOL/L — SIGNIFICANT CHANGE UP (ref 96–108)
CO2 SERPL-SCNC: 29 MMOL/L — SIGNIFICANT CHANGE UP (ref 22–31)
CREAT SERPL-MCNC: 0.88 MG/DL — SIGNIFICANT CHANGE UP (ref 0.5–1.3)
CULTURE RESULTS: ABNORMAL
EGFR: 69 ML/MIN/1.73M2 — SIGNIFICANT CHANGE UP
GLUCOSE SERPL-MCNC: 87 MG/DL — SIGNIFICANT CHANGE UP (ref 70–99)
HCT VFR BLD CALC: 33.5 % — LOW (ref 34.5–45)
HGB BLD-MCNC: 10.2 G/DL — LOW (ref 11.5–15.5)
MAGNESIUM SERPL-MCNC: 1.8 MG/DL — SIGNIFICANT CHANGE UP (ref 1.6–2.6)
MCHC RBC-ENTMCNC: 27.2 PG — SIGNIFICANT CHANGE UP (ref 27–34)
MCHC RBC-ENTMCNC: 30.4 GM/DL — LOW (ref 32–36)
MCV RBC AUTO: 89.3 FL — SIGNIFICANT CHANGE UP (ref 80–100)
NRBC # BLD: 0 /100 WBCS — SIGNIFICANT CHANGE UP (ref 0–0)
PHOSPHATE SERPL-MCNC: 2.4 MG/DL — LOW (ref 2.5–4.5)
PLATELET # BLD AUTO: 134 K/UL — LOW (ref 150–400)
POTASSIUM SERPL-MCNC: 3.8 MMOL/L — SIGNIFICANT CHANGE UP (ref 3.5–5.3)
POTASSIUM SERPL-SCNC: 3.8 MMOL/L — SIGNIFICANT CHANGE UP (ref 3.5–5.3)
RBC # BLD: 3.75 M/UL — LOW (ref 3.8–5.2)
RBC # FLD: 14.6 % — HIGH (ref 10.3–14.5)
SODIUM SERPL-SCNC: 142 MMOL/L — SIGNIFICANT CHANGE UP (ref 135–145)
SPECIMEN SOURCE: SIGNIFICANT CHANGE UP
WBC # BLD: 6.36 K/UL — SIGNIFICANT CHANGE UP (ref 3.8–10.5)
WBC # FLD AUTO: 6.36 K/UL — SIGNIFICANT CHANGE UP (ref 3.8–10.5)

## 2024-03-08 PROCEDURE — 99232 SBSQ HOSP IP/OBS MODERATE 35: CPT

## 2024-03-08 RX ORDER — POTASSIUM PHOSPHATE, MONOBASIC POTASSIUM PHOSPHATE, DIBASIC 236; 224 MG/ML; MG/ML
15 INJECTION, SOLUTION INTRAVENOUS ONCE
Refills: 0 | Status: COMPLETED | OUTPATIENT
Start: 2024-03-08 | End: 2024-03-08

## 2024-03-08 RX ORDER — SODIUM,POTASSIUM PHOSPHATES 278-250MG
2 POWDER IN PACKET (EA) ORAL ONCE
Refills: 0 | Status: COMPLETED | OUTPATIENT
Start: 2024-03-08 | End: 2024-03-08

## 2024-03-08 RX ADMIN — MEMANTINE HYDROCHLORIDE 10 MILLIGRAM(S): 10 TABLET ORAL at 06:35

## 2024-03-08 RX ADMIN — AMPICILLIN SODIUM AND SULBACTAM SODIUM 200 GRAM(S): 250; 125 INJECTION, POWDER, FOR SUSPENSION INTRAMUSCULAR; INTRAVENOUS at 06:56

## 2024-03-08 RX ADMIN — OXYCODONE HYDROCHLORIDE 5 MILLIGRAM(S): 5 TABLET ORAL at 17:02

## 2024-03-08 RX ADMIN — OXYCODONE HYDROCHLORIDE 5 MILLIGRAM(S): 5 TABLET ORAL at 07:50

## 2024-03-08 RX ADMIN — Medication 400 MILLIGRAM(S): at 11:45

## 2024-03-08 RX ADMIN — POTASSIUM PHOSPHATE, MONOBASIC POTASSIUM PHOSPHATE, DIBASIC 62.5 MILLIMOLE(S): 236; 224 INJECTION, SOLUTION INTRAVENOUS at 12:53

## 2024-03-08 RX ADMIN — OXYCODONE HYDROCHLORIDE 2.5 MILLIGRAM(S): 5 TABLET ORAL at 21:07

## 2024-03-08 RX ADMIN — Medication 400 MILLIGRAM(S): at 12:45

## 2024-03-08 RX ADMIN — Medication 400 MILLIGRAM(S): at 17:01

## 2024-03-08 RX ADMIN — OXYCODONE HYDROCHLORIDE 2.5 MILLIGRAM(S): 5 TABLET ORAL at 22:09

## 2024-03-08 RX ADMIN — ATORVASTATIN CALCIUM 10 MILLIGRAM(S): 80 TABLET, FILM COATED ORAL at 21:10

## 2024-03-08 RX ADMIN — Medication 800 INTERNATIONAL UNIT(S): at 11:46

## 2024-03-08 RX ADMIN — OXYCODONE HYDROCHLORIDE 5 MILLIGRAM(S): 5 TABLET ORAL at 18:02

## 2024-03-08 RX ADMIN — Medication 400 MILLIGRAM(S): at 06:35

## 2024-03-08 RX ADMIN — AMPICILLIN SODIUM AND SULBACTAM SODIUM 200 GRAM(S): 250; 125 INJECTION, POWDER, FOR SUSPENSION INTRAMUSCULAR; INTRAVENOUS at 17:01

## 2024-03-08 RX ADMIN — MEMANTINE HYDROCHLORIDE 10 MILLIGRAM(S): 10 TABLET ORAL at 17:00

## 2024-03-08 RX ADMIN — Medication 2 PACKET(S): at 09:41

## 2024-03-08 RX ADMIN — Medication 200 INTERNATIONAL UNIT(S): at 11:51

## 2024-03-08 RX ADMIN — PANTOPRAZOLE SODIUM 40 MILLIGRAM(S): 20 TABLET, DELAYED RELEASE ORAL at 06:35

## 2024-03-08 RX ADMIN — AMPICILLIN SODIUM AND SULBACTAM SODIUM 200 GRAM(S): 250; 125 INJECTION, POWDER, FOR SUSPENSION INTRAMUSCULAR; INTRAVENOUS at 23:29

## 2024-03-08 RX ADMIN — AMPICILLIN SODIUM AND SULBACTAM SODIUM 200 GRAM(S): 250; 125 INJECTION, POWDER, FOR SUSPENSION INTRAMUSCULAR; INTRAVENOUS at 11:45

## 2024-03-08 NOTE — PROGRESS NOTE ADULT - ASSESSMENT
Assessment and Plan:  FILIPPO VIERA is a  74F FOM abscess & ORN of jaw s/p L neck I&D & intraoral mandibular debridement 3/6. ID on board, patient will require a PICC and at least 6 weeks of IV abx.     PLAN:  - follow up with ID in regards to PICC lumen   - f/u OR culture results - prelim consistent with oral srini   - Pain control   - Monitor Penrose - will remove in office     [facility] at [phone] with any questions/concerns.    Hyacinth Miller PA-C  03-08-24 @ 07:59

## 2024-03-08 NOTE — PROGRESS NOTE ADULT - NS ATTEND AMEND GEN_ALL_CORE FT
no event overnight.  Patient didn't remember seeing me and didn't remember the conversation we had yesterday.  She admits having memory issue.  Reports pain at submandibular surgical area.  Mandibular culture so far ngtd.  Cont unasyn 3g IV q6h.  Anticipate 6 weeks of IV abx from the date of OR washout.  Plan to place PICC on 3/11 monday.     Team 2 will follow you.  Case d/w primary team.    Lidia Pugh MD, MS  Infectious Disease attending  office phone [phone]  For any questions during evening/weekend/holiday, please page ID on call

## 2024-03-08 NOTE — PROGRESS NOTE ADULT - ASSESSMENT
74 year old woman with PMH osteoporosis, with suspected left neck abscess found to have MRONJ s/p OR washout and debridement 3/6. Abscess culture from prior washout 1/11 grew strep intermedius, actinomyces and anaerobes. She is afebrile.   OR cultures 3/6:  Intra oral culture with GPC in pairs and GNR on gram stain; oral srini (Rothia mucilaginosa, Neisseria sicca, Streptococcus mitis/oralis group, Streptococcus parasanguinis, Haemophilus parainfluenzae   Mandible culture with GPC in pairs on gram stain; ngtd- culture in progress     Suggest:  -f/u OR cultures 3/6  -continue Unasyn 3g IV Q6hrs   -anticipate 6 weeks of IV antibiotics from date of OR washout    Team 2 will follow you.    Case d/w primary team.  Final recommendation pending attending note.    Lanette Mane, Infectious Diseases PA  Please reach out for any questions 9 am-5pm. For evenings and weekends, please call the ID physician on call.  Work cell: [phone]

## 2024-03-08 NOTE — PROGRESS NOTE ADULT - SUBJECTIVE AND OBJECTIVE BOX
INFECTIOUS DISEASES CONSULT FOLLOW-UP NOTE    INTERVAL HPI/OVERNIGHT EVENTS:      ROS:   Constitutional, eyes, ENT, cardiovascular, respiratory, gastrointestinal, genitourinary, integumentary, neurological, psychiatric and heme/lymph are otherwise negative other than noted above       ANTIBIOTICS/RELEVANT:    MEDICATIONS  (STANDING):  ampicillin/sulbactam  IVPB      ampicillin/sulbactam  IVPB 3 Gram(s) IV Intermittent every 6 hours  atorvastatin 10 milliGRAM(s) Oral at bedtime  memantine 10 milliGRAM(s) Oral two times a day  pantoprazole    Tablet 40 milliGRAM(s) Oral before breakfast  pentoxifylline 400 milliGRAM(s) Oral every 12 hours  vitamin E 800 International Unit(s) Oral daily  vitamin E 200 International Unit(s) Oral daily    MEDICATIONS  (PRN):  acetaminophen     Tablet .. 650 milliGRAM(s) Oral every 6 hours PRN Temp greater or equal to 38C (100.4F), Mild Pain (1 - 3)  HYDROmorphone  Injectable 0.5 milliGRAM(s) IV Push every 15 minutes PRN breakthrough pain  ibuprofen  Tablet. 400 milliGRAM(s) Oral every 6 hours PRN Moderate Pain (4 - 6)  oxyCODONE    IR 2.5 milliGRAM(s) Oral every 6 hours PRN Moderate Pain (4 - 6)  oxyCODONE    IR 5 milliGRAM(s) Oral every 6 hours PRN Severe Pain (7 - 10)        Vital Signs Last 24 Hrs  T(C): 36.6 (08 Mar 2024 08:29), Max: 37 (07 Mar 2024 20:19)  T(F): 97.9 (08 Mar 2024 08:29), Max: 98.6 (07 Mar 2024 20:19)  HR: 61 (08 Mar 2024 08:29) (61 - 68)  BP: 117/71 (08 Mar 2024 08:29) (94/58 - 124/76)  BP(mean): 94 (08 Mar 2024 05:18) (94 - 94)  RR: 17 (08 Mar 2024 08:29) (17 - 17)  SpO2: 99% (08 Mar 2024 08:29) (96% - 99%)    Parameters below as of 08 Mar 2024 08:29  Patient On (Oxygen Delivery Method): room air        03-07-24 @ 07:01  -  03-08-24 @ 07:00  --------------------------------------------------------  IN: 200 mL / OUT: 1325 mL / NET: -1125 mL      PHYSICAL EXAM:  Constitutional: alert, NAD  Eyes: the sclera and conjunctiva were normal.   ENT: the ears and nose were normal in appearance.   Neck: the appearance of the neck was normal and the neck was supple.   Pulmonary: no respiratory distress and lungs were clear to auscultation bilaterally.   Heart: heart rate was normal and rhythm regular, normal S1 and S2  Vascular:. there was no peripheral edema  Abdomen: normal bowel sounds, soft, non-tender  Neurological: no focal deficits.   Psychiatric: the affect was normal        LABS:                        10.2   6.36  )-----------( 134      ( 08 Mar 2024 05:30 )             33.5     03-08    142  |  106  |  10  ----------------------------<  87  3.8   |  29  |  0.88    Ca    8.5      08 Mar 2024 05:30  Phos  2.4     03-08  Mg     1.8     03-08    TPro  5.9<L>  /  Alb  3.6  /  TBili  0.5  /  DBili  x   /  AST  16  /  ALT  6<L>  /  AlkPhos  46  03-07      Urinalysis Basic - ( 08 Mar 2024 05:30 )    Color: x / Appearance: x / SG: x / pH: x  Gluc: 87 mg/dL / Ketone: x  / Bili: x / Urobili: x   Blood: x / Protein: x / Nitrite: x   Leuk Esterase: x / RBC: x / WBC x   Sq Epi: x / Non Sq Epi: x / Bacteria: x        MICROBIOLOGY:      RADIOLOGY & ADDITIONAL STUDIES:  Reviewed INFECTIOUS DISEASES CONSULT FOLLOW-UP NOTE    INTERVAL HPI/OVERNIGHT EVENTS:    Patient seen and examined at bedside. MARQUES. Afebrile.     ROS:   Constitutional, eyes, ENT, cardiovascular, respiratory, gastrointestinal, genitourinary, integumentary, neurological, psychiatric and heme/lymph are otherwise negative other than noted above       ANTIBIOTICS/RELEVANT:    MEDICATIONS  (STANDING):  ampicillin/sulbactam  IVPB      ampicillin/sulbactam  IVPB 3 Gram(s) IV Intermittent every 6 hours  atorvastatin 10 milliGRAM(s) Oral at bedtime  memantine 10 milliGRAM(s) Oral two times a day  pantoprazole    Tablet 40 milliGRAM(s) Oral before breakfast  pentoxifylline 400 milliGRAM(s) Oral every 12 hours  vitamin E 800 International Unit(s) Oral daily  vitamin E 200 International Unit(s) Oral daily    MEDICATIONS  (PRN):  acetaminophen     Tablet .. 650 milliGRAM(s) Oral every 6 hours PRN Temp greater or equal to 38C (100.4F), Mild Pain (1 - 3)  HYDROmorphone  Injectable 0.5 milliGRAM(s) IV Push every 15 minutes PRN breakthrough pain  ibuprofen  Tablet. 400 milliGRAM(s) Oral every 6 hours PRN Moderate Pain (4 - 6)  oxyCODONE    IR 2.5 milliGRAM(s) Oral every 6 hours PRN Moderate Pain (4 - 6)  oxyCODONE    IR 5 milliGRAM(s) Oral every 6 hours PRN Severe Pain (7 - 10)        Vital Signs Last 24 Hrs  T(C): 36.6 (08 Mar 2024 08:29), Max: 37 (07 Mar 2024 20:19)  T(F): 97.9 (08 Mar 2024 08:29), Max: 98.6 (07 Mar 2024 20:19)  HR: 61 (08 Mar 2024 08:29) (61 - 68)  BP: 117/71 (08 Mar 2024 08:29) (94/58 - 124/76)  BP(mean): 94 (08 Mar 2024 05:18) (94 - 94)  RR: 17 (08 Mar 2024 08:29) (17 - 17)  SpO2: 99% (08 Mar 2024 08:29) (96% - 99%)    Parameters below as of 08 Mar 2024 08:29  Patient On (Oxygen Delivery Method): room air        03-07-24 @ 07:01  -  03-08-24 @ 07:00  --------------------------------------------------------  IN: 200 mL / OUT: 1325 mL / NET: -1125 mL      PHYSICAL EXAM:  Constitutional: alert, NAD  Eyes: the sclera and conjunctiva were normal.   ENT: the ears and nose were normal in appearance.   L Jaw/Neck dressing c/d/i. +penrose   Neck: the appearance of the neck was normal and the neck was supple.   Pulmonary: no respiratory distress and lungs were clear to auscultation bilaterally.   Heart: heart rate was normal and rhythm regular, normal S1 and S2  Vascular:. there was no peripheral edema  Abdomen: normal bowel sounds, soft, non-tender  Neurological: no focal deficits.   Psychiatric: the affect was normal        LABS:                        10.2   6.36  )-----------( 134      ( 08 Mar 2024 05:30 )             33.5     03-08    142  |  106  |  10  ----------------------------<  87  3.8   |  29  |  0.88    Ca    8.5      08 Mar 2024 05:30  Phos  2.4     03-08  Mg     1.8     03-08    TPro  5.9<L>  /  Alb  3.6  /  TBili  0.5  /  DBili  x   /  AST  16  /  ALT  6<L>  /  AlkPhos  46  03-07      Urinalysis Basic - ( 08 Mar 2024 05:30 )    Color: x / Appearance: x / SG: x / pH: x  Gluc: 87 mg/dL / Ketone: x  / Bili: x / Urobili: x   Blood: x / Protein: x / Nitrite: x   Leuk Esterase: x / RBC: x / WBC x   Sq Epi: x / Non Sq Epi: x / Bacteria: x        MICROBIOLOGY:    Culture - Fungal, Other (collected 03-06-24 @ 09:10)  Source: .Other FUNGAL-#2-INRTA ORAL CULTURE  Preliminary Report (03-07-24 @ 08:14):    Testing in progress    Culture - Acid Fast - Other w/Smear (collected 03-06-24 @ 09:10)  Source: .Other AFB-#2-INRTA ORAL CULTURE    Culture - Surgical Swab (collected 03-06-24 @ 09:10)  Source: .Surgical Swab #2-INRTA ORAL CULTURE  Gram Stain (03-06-24 @ 16:43):    Few Gram positive cocci in pairs    Few Gram Negative Rods    Few White blood cells  Preliminary Report (03-07-24 @ 14:13):    Culture consistent with oral srini    Moderate Rothia mucilaginosa    Few Neisseria sicca    Few Streptococcus mitis/oralis group    Few Streptococcus parasanguinis    Rare Haemophilus parainfluenzae    Culture grew 3 or more types of organisms which indicate collection    contamination; consider recollection only if clinically indicated.    Culture in progress    Culture - Fungal, Other (collected 03-06-24 @ 09:10)  Source: .Other FUNGAL-#1-ANTERIOR MANDIBLE CULTURE  Preliminary Report (03-07-24 @ 08:14):    Testing in progress    Culture - Acid Fast - Other w/Smear (collected 03-06-24 @ 09:10)  Source: .Other AFB-#1-ANTERIOR MANDIBLE CULTURE    Culture - Surgical Swab (collected 03-06-24 @ 09:10)  Source: .Surgical Swab #1-ANTERIOR MANDIBLE CULTURE  Gram Stain (03-06-24 @ 16:44):    Rare Gram positive cocci in pairs    Moderate White blood cells  Preliminary Report (03-07-24 @ 08:42):    No growth to date        RADIOLOGY & ADDITIONAL STUDIES:  Reviewed

## 2024-03-08 NOTE — PROGRESS NOTE ADULT - SUBJECTIVE AND OBJECTIVE BOX
OTOLARYNGOLOGY (ENT) PROGRESS NOTE    PATIENT: FILIPPO VIERA  MRN: [id number]  : 52  [id number]  DATE OF SERVICE:  24  			         ID:FILIPPO VIERA is a 74F FOM abscess & ORN of jaw s/p L neck I&D & intraoral mandibular debridement 3/6.     Subjective/ Interval:   3/7: patient seen this morning,  intraoral sutures intact, penrose with minimal drainage . Pain starting to improve. Cx growing GPC and GNR. ID recommends 6 weeks of unasyn which she tolerated without issue.  3/8; afebrile,  minimal penrose drainage with surrounding induration,  will continue to follow up with ID in regards to abx     ALLERGIES:  Levaquin (Unknown)  lidocaine (Unknown)      MEDICATIONS:  Antiinfectives:   ampicillin/sulbactam  IVPB      ampicillin/sulbactam  IVPB 3 Gram(s) IV Intermittent every 6 hours    IV fluids:  potassium phosphate / sodium phosphate Powder (PHOS-NaK) 2 Packet(s) Oral once  vitamin E 800 International Unit(s) Oral daily  vitamin E 200 International Unit(s) Oral daily    Hematologic/Anticoagulation:  pentoxifylline 400 milliGRAM(s) Oral every 12 hours    Pain medications/Neuro:  acetaminophen     Tablet .. 650 milliGRAM(s) Oral every 6 hours PRN  HYDROmorphone  Injectable 0.5 milliGRAM(s) IV Push every 15 minutes PRN  ibuprofen  Tablet. 400 milliGRAM(s) Oral every 6 hours PRN  memantine 10 milliGRAM(s) Oral two times a day  oxyCODONE    IR 2.5 milliGRAM(s) Oral every 6 hours PRN  oxyCODONE    IR 5 milliGRAM(s) Oral every 6 hours PRN    Endocrine Medications:   atorvastatin 10 milliGRAM(s) Oral at bedtime    All other standing medications:   pantoprazole    Tablet 40 milliGRAM(s) Oral before breakfast    All other PRN medications:    Vital Signs Last 24 Hrs  T(C): 36.9 (08 Mar 2024 05:18), Max: 37 (07 Mar 2024 20:19)  T(F): 98.4 (08 Mar 2024 05:18), Max: 98.6 (07 Mar 2024 20:19)  HR: 65 (08 Mar 2024 05:18) (65 - 75)  BP: 112/86 (08 Mar 2024 05:18) (94/58 - 124/76)  BP(mean): 94 (08 Mar 2024 05:18) (94 - 94)  RR: 17 (08 Mar 2024 05:18) (17 - 17)  SpO2: 97% (08 Mar 2024 05:18) (96% - 98%)    Parameters below as of 08 Mar 2024 05:18  Patient On (Oxygen Delivery Method): room air          -07 @ 07:01  -   @ 07:00  --------------------------------------------------------  IN:    Lactated Ringers: 200 mL  Total IN: 200 mL    OUT:    Voided (mL): 1325 mL  Total OUT: 1325 mL    Total NET: -1125 mL                  PHYSICAL EXAM:  GEN: appears stated age, NAD  NEURO: awake, poor memory   HEENT:    Head: NC/AT  Face: symmetric facial movements bilaterally, intact CN VII bilaterally  Eyes: EOMI  Nose; Clear to anterior rhinoscopy  Ears: Auricles well formed, no mastoid tip tenderness   Oral cavity/oropharynx: moist mucous membranes, intraoral sutures intact floor of mouth tender to palpation on left side, not raised. Symmetric elevation of palate, posterior oropharynx visualized  Neck: penrose with SS output with surrounding induration,  tenderness to palpation most prominent left submental. Neck is soft. Full Range of motion of neck  CVS: regular rate and rhythm  Pulm: normal respiratory excursions, not tachypneic, no labored breathing  Abd: non-distended  Ext: moving all four extremities, no peripheral edema noted         LABS                       10.2   6.36  )-----------( 134      ( 08 Mar 2024 05:30 )             33.5    03-08    142  |  106  |  10  ----------------------------<  87  3.8   |  29  |  0.88    Ca    8.5      08 Mar 2024 05:30  Phos  2.4     03-08  Mg     1.8     03-08    TPro  5.9<L>  /  Alb  3.6  /  TBili  0.5  /  DBili  x   /  AST  16  /  ALT  6<L>  /  AlkPhos  46           Coagulation Studies-     Urinalysis Basic - ( 08 Mar 2024 05:30 )    Color: x / Appearance: x / SG: x / pH: x  Gluc: 87 mg/dL / Ketone: x  / Bili: x / Urobili: x   Blood: x / Protein: x / Nitrite: x   Leuk Esterase: x / RBC: x / WBC x   Sq Epi: x / Non Sq Epi: x / Bacteria: x      Endocrine Panel-  Calcium: 8.5 mg/dL ( @ 05:30)  Calcium: 8.7 mg/dL ( @ 17:04)                MICROBIOLOGY:  Culture - Surgical Swab (collected 24 @ 09:10)  Source: .Surgical Swab #2-INRTA ORAL CULTURE  Gram Stain (24 @ 16:43):    Few Gram positive cocci in pairs    Few Gram Negative Rods    Few White blood cells  Preliminary Report (24 @ 14:13):    Culture consistent with oral srini    Moderate Rothia mucilaginosa    Few Neisseria sicca    Few Streptococcus mitis/oralis group    Few Streptococcus parasanguinis    Rare Haemophilus parainfluenzae    Culture grew 3 or more types of organisms which indicate collection    contamination; consider recollection only if clinically indicated.    Culture in progress    Culture - Surgical Swab (collected 24 @ 09:10)  Source: .Surgical Swab #1-ANTERIOR MANDIBLE CULTURE  Gram Stain (24 @ 16:44):    Rare Gram positive cocci in pairs    Moderate White blood cells  Preliminary Report (24 @ 08:42):    No growth to date      Culture Results:   No growth to date (24 @ 09:10)  Culture Results:   Testing in progress (24 @ 09:10)  Culture Results:   Culture consistent with oral srini  Moderate Rothia mucilaginosa  Few Neisseria sicca  Few Streptococcus mitis/oralis group  Few Streptococcus parasanguinis  Rare Haemophilus parainfluenzae  Culture grew 3 or more types of organisms which indicate collection  contamination; consider recollection only if clinically indicated.  Culture in progress (24 @ 09:10)  Culture Results:   Testing in progress (24 @ 09:10)      Culture - Fungal, Other (collected 24 @ 09:10)  Source: .Other FUNGAL-#2-INRTA ORAL CULTURE  Preliminary Report (24 @ 08:14):    Testing in progress    Culture - Acid Fast - Other w/Smear (collected 24 @ 09:10)  Source: .Other AFB-#2-INRTA ORAL CULTURE    Culture - Surgical Swab (collected 24 @ 09:10)  Source: .Surgical Swab #2-INRTA ORAL CULTURE  Gram Stain (24 @ 16:43):    Few Gram positive cocci in pairs    Few Gram Negative Rods    Few White blood cells  Preliminary Report (24 @ 14:13):    Culture consistent with oral srini    Moderate Rothia mucilaginosa    Few Neisseria sicca    Few Streptococcus mitis/oralis group    Few Streptococcus parasanguinis    Rare Haemophilus parainfluenzae    Culture grew 3 or more types of organisms which indicate collection    contamination; consider recollection only if clinically indicated.    Culture in progress    Culture - Fungal, Other (collected 24 @ 09:10)  Source: .Other FUNGAL-#1-ANTERIOR MANDIBLE CULTURE  Preliminary Report (24 @ 08:14):    Testing in progress    Culture - Acid Fast - Other w/Smear (collected 24 @ 09:10)  Source: .Other AFB-#1-ANTERIOR MANDIBLE CULTURE    Culture - Surgical Swab (collected 24 @ 09:10)  Source: .Surgical Swab #1-ANTERIOR MANDIBLE CULTURE  Gram Stain (24 @ 16:44):    Rare Gram positive cocci in pairs    Moderate White blood cells  Preliminary Report (24 @ 08:42):    No growth to date

## 2024-03-09 LAB
-  CEFTRIAXONE: SIGNIFICANT CHANGE UP
-  PENICILLIN: SIGNIFICANT CHANGE UP
ANION GAP SERPL CALC-SCNC: 9 MMOL/L — SIGNIFICANT CHANGE UP (ref 5–17)
BUN SERPL-MCNC: 10 MG/DL — SIGNIFICANT CHANGE UP (ref 7–23)
CALCIUM SERPL-MCNC: 9.3 MG/DL — SIGNIFICANT CHANGE UP (ref 8.4–10.5)
CHLORIDE SERPL-SCNC: 105 MMOL/L — SIGNIFICANT CHANGE UP (ref 96–108)
CO2 SERPL-SCNC: 29 MMOL/L — SIGNIFICANT CHANGE UP (ref 22–31)
CREAT SERPL-MCNC: 0.77 MG/DL — SIGNIFICANT CHANGE UP (ref 0.5–1.3)
CULTURE RESULTS: ABNORMAL
EGFR: 81 ML/MIN/1.73M2 — SIGNIFICANT CHANGE UP
GLUCOSE SERPL-MCNC: 90 MG/DL — SIGNIFICANT CHANGE UP (ref 70–99)
HCT VFR BLD CALC: 34.6 % — SIGNIFICANT CHANGE UP (ref 34.5–45)
HGB BLD-MCNC: 10.8 G/DL — LOW (ref 11.5–15.5)
MAGNESIUM SERPL-MCNC: 1.9 MG/DL — SIGNIFICANT CHANGE UP (ref 1.6–2.6)
MCHC RBC-ENTMCNC: 27.5 PG — SIGNIFICANT CHANGE UP (ref 27–34)
MCHC RBC-ENTMCNC: 31.2 GM/DL — LOW (ref 32–36)
MCV RBC AUTO: 88 FL — SIGNIFICANT CHANGE UP (ref 80–100)
METHOD TYPE: SIGNIFICANT CHANGE UP
METHOD TYPE: SIGNIFICANT CHANGE UP
NRBC # BLD: 0 /100 WBCS — SIGNIFICANT CHANGE UP (ref 0–0)
ORGANISM # SPEC MICROSCOPIC CNT: ABNORMAL
ORGANISM # SPEC MICROSCOPIC CNT: ABNORMAL
ORGANISM # SPEC MICROSCOPIC CNT: SIGNIFICANT CHANGE UP
PHOSPHATE SERPL-MCNC: 3.4 MG/DL — SIGNIFICANT CHANGE UP (ref 2.5–4.5)
PLATELET # BLD AUTO: 142 K/UL — LOW (ref 150–400)
POTASSIUM SERPL-MCNC: 4 MMOL/L — SIGNIFICANT CHANGE UP (ref 3.5–5.3)
POTASSIUM SERPL-SCNC: 4 MMOL/L — SIGNIFICANT CHANGE UP (ref 3.5–5.3)
RBC # BLD: 3.93 M/UL — SIGNIFICANT CHANGE UP (ref 3.8–5.2)
RBC # FLD: 14.3 % — SIGNIFICANT CHANGE UP (ref 10.3–14.5)
SODIUM SERPL-SCNC: 143 MMOL/L — SIGNIFICANT CHANGE UP (ref 135–145)
SPECIMEN SOURCE: SIGNIFICANT CHANGE UP
WBC # BLD: 5.34 K/UL — SIGNIFICANT CHANGE UP (ref 3.8–10.5)
WBC # FLD AUTO: 5.34 K/UL — SIGNIFICANT CHANGE UP (ref 3.8–10.5)

## 2024-03-09 RX ORDER — LANOLIN ALCOHOL/MO/W.PET/CERES
3 CREAM (GRAM) TOPICAL AT BEDTIME
Refills: 0 | Status: DISCONTINUED | OUTPATIENT
Start: 2024-03-09 | End: 2024-03-12

## 2024-03-09 RX ADMIN — Medication 400 MILLIGRAM(S): at 01:18

## 2024-03-09 RX ADMIN — Medication 400 MILLIGRAM(S): at 10:56

## 2024-03-09 RX ADMIN — Medication 400 MILLIGRAM(S): at 23:20

## 2024-03-09 RX ADMIN — Medication 400 MILLIGRAM(S): at 05:48

## 2024-03-09 RX ADMIN — OXYCODONE HYDROCHLORIDE 5 MILLIGRAM(S): 5 TABLET ORAL at 19:58

## 2024-03-09 RX ADMIN — OXYCODONE HYDROCHLORIDE 5 MILLIGRAM(S): 5 TABLET ORAL at 14:34

## 2024-03-09 RX ADMIN — MEMANTINE HYDROCHLORIDE 10 MILLIGRAM(S): 10 TABLET ORAL at 05:48

## 2024-03-09 RX ADMIN — ATORVASTATIN CALCIUM 10 MILLIGRAM(S): 80 TABLET, FILM COATED ORAL at 22:35

## 2024-03-09 RX ADMIN — AMPICILLIN SODIUM AND SULBACTAM SODIUM 200 GRAM(S): 250; 125 INJECTION, POWDER, FOR SUSPENSION INTRAMUSCULAR; INTRAVENOUS at 11:02

## 2024-03-09 RX ADMIN — MEMANTINE HYDROCHLORIDE 10 MILLIGRAM(S): 10 TABLET ORAL at 17:10

## 2024-03-09 RX ADMIN — AMPICILLIN SODIUM AND SULBACTAM SODIUM 200 GRAM(S): 250; 125 INJECTION, POWDER, FOR SUSPENSION INTRAMUSCULAR; INTRAVENOUS at 17:09

## 2024-03-09 RX ADMIN — AMPICILLIN SODIUM AND SULBACTAM SODIUM 200 GRAM(S): 250; 125 INJECTION, POWDER, FOR SUSPENSION INTRAMUSCULAR; INTRAVENOUS at 23:33

## 2024-03-09 RX ADMIN — OXYCODONE HYDROCHLORIDE 5 MILLIGRAM(S): 5 TABLET ORAL at 13:34

## 2024-03-09 RX ADMIN — Medication 3 MILLIGRAM(S): at 23:20

## 2024-03-09 RX ADMIN — Medication 800 INTERNATIONAL UNIT(S): at 11:15

## 2024-03-09 RX ADMIN — Medication 400 MILLIGRAM(S): at 17:10

## 2024-03-09 RX ADMIN — Medication 400 MILLIGRAM(S): at 00:19

## 2024-03-09 RX ADMIN — Medication 400 MILLIGRAM(S): at 18:10

## 2024-03-09 RX ADMIN — AMPICILLIN SODIUM AND SULBACTAM SODIUM 200 GRAM(S): 250; 125 INJECTION, POWDER, FOR SUSPENSION INTRAMUSCULAR; INTRAVENOUS at 05:48

## 2024-03-09 RX ADMIN — Medication 400 MILLIGRAM(S): at 11:56

## 2024-03-09 RX ADMIN — OXYCODONE HYDROCHLORIDE 5 MILLIGRAM(S): 5 TABLET ORAL at 20:58

## 2024-03-09 RX ADMIN — PANTOPRAZOLE SODIUM 40 MILLIGRAM(S): 20 TABLET, DELAYED RELEASE ORAL at 06:11

## 2024-03-09 RX ADMIN — Medication 200 INTERNATIONAL UNIT(S): at 11:15

## 2024-03-09 NOTE — PROGRESS NOTE ADULT - SUBJECTIVE AND OBJECTIVE BOX
OTOLARYNGOLOGY (ENT) PROGRESS NOTE    PATIENT: FILIPPO VIERA  MRN: [id number]  : 52  [id number]  DATE OF SERVICE:  24  			         ID:FILIPPO VIERA is a 74F FOM abscess & ORN of jaw s/p L neck I&D & intraoral mandibular debridement 3/6.     Subjective/ Interval:   3/7: patient seen this morning,  intraoral sutures intact, penrose with minimal drainage . Pain starting to improve. Cx growing GPC and GNR. ID recommends 6 weeks of unasyn which she tolerated without issue.  3/8; afebrile,  minimal penrose drainage with surrounding induration,  will continue to follow up with ID in regards to abx   3/9: afebrile, patient seen on morning rounds, minimal penrose drainage, pending PICC placement for long term abx pending culture sensitivities     ALLERGIES:  Levaquin (Unknown)  lidocaine (Unknown)      MEDICATIONS:  Antiinfectives:   ampicillin/sulbactam  IVPB      ampicillin/sulbactam  IVPB 3 Gram(s) IV Intermittent every 6 hours    IV fluids:  potassium phosphate / sodium phosphate Powder (PHOS-NaK) 2 Packet(s) Oral once  vitamin E 800 International Unit(s) Oral daily  vitamin E 200 International Unit(s) Oral daily    Hematologic/Anticoagulation:  pentoxifylline 400 milliGRAM(s) Oral every 12 hours    Pain medications/Neuro:  acetaminophen     Tablet .. 650 milliGRAM(s) Oral every 6 hours PRN  HYDROmorphone  Injectable 0.5 milliGRAM(s) IV Push every 15 minutes PRN  ibuprofen  Tablet. 400 milliGRAM(s) Oral every 6 hours PRN  memantine 10 milliGRAM(s) Oral two times a day  oxyCODONE    IR 2.5 milliGRAM(s) Oral every 6 hours PRN  oxyCODONE    IR 5 milliGRAM(s) Oral every 6 hours PRN    Endocrine Medications:   atorvastatin 10 milliGRAM(s) Oral at bedtime    All other standing medications:   pantoprazole    Tablet 40 milliGRAM(s) Oral before breakfast    All other PRN medications:    Vital Signs Last 24 Hrs  T(C): 36.7 (09 Mar 2024 08:19), Max: 36.9 (08 Mar 2024 12:54)  T(F): 98 (09 Mar 2024 08:19), Max: 98.4 (08 Mar 2024 12:54)  HR: 66 (09 Mar 2024 08:19) (63 - 70)  BP: 122/75 (09 Mar 2024 08:19) (108/65 - 125/79)  BP(mean): --  RR: 17 (09 Mar 2024 08:19) (16 - 17)  SpO2: 97% (09 Mar 2024 08:19) (96% - 97%)    Parameters below as of 09 Mar 2024 08:19  Patient On (Oxygen Delivery Method): room air      PHYSICAL EXAM:  GEN: appears stated age, NAD  NEURO: awake, poor memory   HEENT:    Head: NC/AT  Face: symmetric facial movements bilaterally, intact CN VII bilaterally  Eyes: EOMI  Nose; Clear to anterior rhinoscopy  Ears: Auricles well formed, no mastoid tip tenderness   Oral cavity/oropharynx: moist mucous membranes, intraoral sutures intact floor of mouth tender to palpation on left side, not raised. Symmetric elevation of palate, posterior oropharynx visualized  Neck: penrose with SS output with surrounding induration,  tenderness to palpation most prominent left submental. Neck is soft. Full Range of motion of neck  CVS: regular rate and rhythm  Pulm: normal respiratory excursions, not tachypneic, no labored breathing  Abd: non-distended  Ext: moving all four extremities, no peripheral edema noted         LABS                                  10.8   5.34  )-----------( 142      ( 09 Mar 2024 07:01 )             34.6        03-09    143  |  105  |  10  ----------------------------<  90  4.0   |  29  |  0.77    Ca    9.3      09 Mar 2024 07:01  Phos  3.4     03-09  Mg     1.9     03-09    TPro  5.9<L>  /  Alb  3.6  /  TBili  0.5  /  DBili  x   /  AST  16  /  ALT  6<L>  /  AlkPhos  46  03-07      Coagulation Studies-                 MICROBIOLOGY:  Culture - Surgical Swab (collected 24 @ 09:10)  Source: .Surgical Swab #2-INRTA ORAL CULTURE  Gram Stain (24 @ 16:43):    Few Gram positive cocci in pairs    Few Gram Negative Rods    Few White blood cells  Preliminary Report (24 @ 14:13):    Culture consistent with oral srini    Moderate Rothia mucilaginosa    Few Neisseria sicca    Few Streptococcus mitis/oralis group    Few Streptococcus parasanguinis    Rare Haemophilus parainfluenzae    Culture grew 3 or more types of organisms which indicate collection    contamination; consider recollection only if clinically indicated.    Culture in progress    Culture - Surgical Swab (collected 24 @ 09:10)  Source: .Surgical Swab #1-ANTERIOR MANDIBLE CULTURE  Gram Stain (24 @ 16:44):    Rare Gram positive cocci in pairs    Moderate White blood cells  Preliminary Report (24 @ 08:42):    No growth to date      Culture Results:   No growth to date (24 @ 09:10)  Culture Results:   Testing in progress (24 @ 09:10)  Culture Results:   Culture consistent with oral srini  Moderate Rothia mucilaginosa  Few Neisseria sicca  Few Streptococcus mitis/oralis group  Few Streptococcus parasanguinis  Rare Haemophilus parainfluenzae  Culture grew 3 or more types of organisms which indicate collection  contamination; consider recollection only if clinically indicated.  Culture in progress (24 @ 09:10)  Culture Results:   Testing in progress (24 @ 09:10)      Culture - Fungal, Other (collected 24 @ 09:10)  Source: .Other FUNGAL-#2-INRTA ORAL CULTURE  Preliminary Report (24 @ 08:14):    Testing in progress    Culture - Acid Fast - Other w/Smear (collected 24 @ 09:10)  Source: .Other AFB-#2-INRTA ORAL CULTURE    Culture - Surgical Swab (collected 24 @ 09:10)  Source: .Surgical Swab #2-INRTA ORAL CULTURE  Gram Stain (24 @ 16:43):    Few Gram positive cocci in pairs    Few Gram Negative Rods    Few White blood cells  Preliminary Report (24 @ 14:13):    Culture consistent with oral srini    Moderate Rothia mucilaginosa    Few Neisseria sicca    Few Streptococcus mitis/oralis group    Few Streptococcus parasanguinis    Rare Haemophilus parainfluenzae    Culture grew 3 or more types of organisms which indicate collection    contamination; consider recollection only if clinically indicated.    Culture in progress    Culture - Fungal, Other (collected 24 @ 09:10)  Source: .Other FUNGAL-#1-ANTERIOR MANDIBLE CULTURE  Preliminary Report (24 @ 08:14):    Testing in progress    Culture - Acid Fast - Other w/Smear (collected 24 @ 09:10)  Source: .Other AFB-#1-ANTERIOR MANDIBLE CULTURE    Culture - Surgical Swab (collected 24 @ 09:10)  Source: .Surgical Swab #1-ANTERIOR MANDIBLE CULTURE  Gram Stain (24 @ 16:44):    Rare Gram positive cocci in pairs    Moderate White blood cells  Preliminary Report (24 @ 08:42):    No growth to date

## 2024-03-09 NOTE — PROGRESS NOTE ADULT - ASSESSMENT
Assessment and Plan:  FILIPPO VIERA is a  74F FOM abscess & ORN of jaw s/p L neck I&D & intraoral mandibular debridement 3/6. ID on board, patient will require a PICC and at least 6 weeks of IV abx.     PLAN:  - follow up with ID in regards to PICC lumen. Pending culture sensitivities  - f/u OR culture results - Final consistent with oral srini   - Pain control   - Monitor Penrose - will remove in office

## 2024-03-10 LAB
ANION GAP SERPL CALC-SCNC: 10 MMOL/L — SIGNIFICANT CHANGE UP (ref 5–17)
BUN SERPL-MCNC: 12 MG/DL — SIGNIFICANT CHANGE UP (ref 7–23)
CALCIUM SERPL-MCNC: 9.7 MG/DL — SIGNIFICANT CHANGE UP (ref 8.4–10.5)
CHLORIDE SERPL-SCNC: 102 MMOL/L — SIGNIFICANT CHANGE UP (ref 96–108)
CO2 SERPL-SCNC: 28 MMOL/L — SIGNIFICANT CHANGE UP (ref 22–31)
CREAT SERPL-MCNC: 0.92 MG/DL — SIGNIFICANT CHANGE UP (ref 0.5–1.3)
EGFR: 66 ML/MIN/1.73M2 — SIGNIFICANT CHANGE UP
GLUCOSE SERPL-MCNC: 94 MG/DL — SIGNIFICANT CHANGE UP (ref 70–99)
HCT VFR BLD CALC: 34.8 % — SIGNIFICANT CHANGE UP (ref 34.5–45)
HGB BLD-MCNC: 11.1 G/DL — LOW (ref 11.5–15.5)
MCHC RBC-ENTMCNC: 27.7 PG — SIGNIFICANT CHANGE UP (ref 27–34)
MCHC RBC-ENTMCNC: 31.9 GM/DL — LOW (ref 32–36)
MCV RBC AUTO: 86.8 FL — SIGNIFICANT CHANGE UP (ref 80–100)
NRBC # BLD: 0 /100 WBCS — SIGNIFICANT CHANGE UP (ref 0–0)
PLATELET # BLD AUTO: 153 K/UL — SIGNIFICANT CHANGE UP (ref 150–400)
POTASSIUM SERPL-MCNC: 4.1 MMOL/L — SIGNIFICANT CHANGE UP (ref 3.5–5.3)
POTASSIUM SERPL-SCNC: 4.1 MMOL/L — SIGNIFICANT CHANGE UP (ref 3.5–5.3)
RBC # BLD: 4.01 M/UL — SIGNIFICANT CHANGE UP (ref 3.8–5.2)
RBC # FLD: 14.3 % — SIGNIFICANT CHANGE UP (ref 10.3–14.5)
SODIUM SERPL-SCNC: 140 MMOL/L — SIGNIFICANT CHANGE UP (ref 135–145)
WBC # BLD: 5.75 K/UL — SIGNIFICANT CHANGE UP (ref 3.8–10.5)
WBC # FLD AUTO: 5.75 K/UL — SIGNIFICANT CHANGE UP (ref 3.8–10.5)

## 2024-03-10 PROCEDURE — 99232 SBSQ HOSP IP/OBS MODERATE 35: CPT

## 2024-03-10 RX ORDER — CEFTRIAXONE 500 MG/1
2000 INJECTION, POWDER, FOR SOLUTION INTRAMUSCULAR; INTRAVENOUS EVERY 24 HOURS
Refills: 0 | Status: DISCONTINUED | OUTPATIENT
Start: 2024-03-10 | End: 2024-03-12

## 2024-03-10 RX ADMIN — Medication 400 MILLIGRAM(S): at 22:16

## 2024-03-10 RX ADMIN — OXYCODONE HYDROCHLORIDE 5 MILLIGRAM(S): 5 TABLET ORAL at 20:51

## 2024-03-10 RX ADMIN — OXYCODONE HYDROCHLORIDE 5 MILLIGRAM(S): 5 TABLET ORAL at 19:51

## 2024-03-10 RX ADMIN — Medication 400 MILLIGRAM(S): at 23:16

## 2024-03-10 RX ADMIN — Medication 400 MILLIGRAM(S): at 07:57

## 2024-03-10 RX ADMIN — MEMANTINE HYDROCHLORIDE 10 MILLIGRAM(S): 10 TABLET ORAL at 05:45

## 2024-03-10 RX ADMIN — CEFTRIAXONE 100 MILLIGRAM(S): 500 INJECTION, POWDER, FOR SOLUTION INTRAMUSCULAR; INTRAVENOUS at 10:55

## 2024-03-10 RX ADMIN — MEMANTINE HYDROCHLORIDE 10 MILLIGRAM(S): 10 TABLET ORAL at 18:37

## 2024-03-10 RX ADMIN — AMPICILLIN SODIUM AND SULBACTAM SODIUM 200 GRAM(S): 250; 125 INJECTION, POWDER, FOR SUSPENSION INTRAMUSCULAR; INTRAVENOUS at 05:43

## 2024-03-10 RX ADMIN — Medication 200 INTERNATIONAL UNIT(S): at 11:43

## 2024-03-10 RX ADMIN — Medication 400 MILLIGRAM(S): at 18:37

## 2024-03-10 RX ADMIN — PANTOPRAZOLE SODIUM 40 MILLIGRAM(S): 20 TABLET, DELAYED RELEASE ORAL at 06:39

## 2024-03-10 RX ADMIN — Medication 400 MILLIGRAM(S): at 00:20

## 2024-03-10 RX ADMIN — Medication 3 MILLIGRAM(S): at 22:42

## 2024-03-10 RX ADMIN — ATORVASTATIN CALCIUM 10 MILLIGRAM(S): 80 TABLET, FILM COATED ORAL at 22:16

## 2024-03-10 RX ADMIN — OXYCODONE HYDROCHLORIDE 5 MILLIGRAM(S): 5 TABLET ORAL at 13:42

## 2024-03-10 RX ADMIN — OXYCODONE HYDROCHLORIDE 5 MILLIGRAM(S): 5 TABLET ORAL at 13:38

## 2024-03-10 RX ADMIN — Medication 800 INTERNATIONAL UNIT(S): at 11:41

## 2024-03-10 RX ADMIN — Medication 400 MILLIGRAM(S): at 05:43

## 2024-03-10 RX ADMIN — Medication 400 MILLIGRAM(S): at 06:57

## 2024-03-10 RX ADMIN — OXYCODONE HYDROCHLORIDE 2.5 MILLIGRAM(S): 5 TABLET ORAL at 11:03

## 2024-03-10 RX ADMIN — OXYCODONE HYDROCHLORIDE 2.5 MILLIGRAM(S): 5 TABLET ORAL at 11:12

## 2024-03-10 NOTE — PROGRESS NOTE ADULT - TIME BILLING
mandible OM, osteonecrosis of jaw
OM of mandible, abscess of mandible, osteonecrosis of jaw.
OM of mandible, abscess of mandible, osteonecrosis of jaw.

## 2024-03-10 NOTE — PROGRESS NOTE ADULT - SUBJECTIVE AND OBJECTIVE BOX
OTOLARYNGOLOGY (ENT) PROGRESS NOTE    PATIENT: FILIPPO VIERA  MRN: [id number]  : 52  [id number]  DATE OF SERVICE:  03-10-24  			         ID:FILIPPO VIERA is a  74yFemale with  FOM abscess & ORN of jaw s/p L neck I&D & intraoral mandibular debridement 3/6.     Subjective/ Interval:   3/7: patient seen this morning,  intraoral sutures intact, penrose with minimal drainage . Pain starting to improve. Cx growing GPC and GNR. ID recommends 6 weeks of unasyn which she tolerated without issue.  3/8; afebrile,  minimal penrose drainage with surrounding induration,  will continue to follow up with ID in regards to abx   3/9: afebrile, patient seen on morning rounds, minimal penrose drainage, pending PICC placement for long term abx pending culture sensitivities   3/10: Afebrile, seen on morning rounds, minimal penrose drainage with manual pressure. Pending PICC placement for long term abx for strep intermedius sensitive to ceftriaxone and penicillin.      ALLERGIES:  Levaquin (Unknown)  lidocaine (Unknown)      MEDICATIONS:  Antiinfectives:   cefTRIAXone   IVPB 2000 milliGRAM(s) IV Intermittent every 24 hours    IV fluids:  vitamin E 800 International Unit(s) Oral daily  vitamin E 200 International Unit(s) Oral daily    Hematologic/Anticoagulation:  pentoxifylline 400 milliGRAM(s) Oral every 12 hours    Pain medications/Neuro:  acetaminophen     Tablet .. 650 milliGRAM(s) Oral every 6 hours PRN  HYDROmorphone  Injectable 0.5 milliGRAM(s) IV Push every 15 minutes PRN  ibuprofen  Tablet. 400 milliGRAM(s) Oral every 6 hours PRN  melatonin 3 milliGRAM(s) Oral at bedtime PRN  memantine 10 milliGRAM(s) Oral two times a day  oxyCODONE    IR 2.5 milliGRAM(s) Oral every 6 hours PRN  oxyCODONE    IR 5 milliGRAM(s) Oral every 6 hours PRN    Endocrine Medications:   atorvastatin 10 milliGRAM(s) Oral at bedtime    All other standing medications:   pantoprazole    Tablet 40 milliGRAM(s) Oral before breakfast    All other PRN medications:    Vital Signs Last 24 Hrs  T(C): 36.8 (10 Mar 2024 08:40), Max: 36.9 (09 Mar 2024 13:20)  T(F): 98.3 (10 Mar 2024 08:40), Max: 98.4 (09 Mar 2024 13:20)  HR: 63 (10 Mar 2024 08:40) (63 - 73)  BP: 127/81 (10 Mar 2024 08:40) (106/73 - 127/81)  BP(mean): --  RR: 17 (10 Mar 2024 08:40) (17 - 19)  SpO2: 97% (10 Mar 2024 08:40) (97% - 99%)    Parameters below as of 10 Mar 2024 08:40  Patient On (Oxygen Delivery Method): room air          - @ 06:01  -  03-10 @ 07:00  --------------------------------------------------------  IN:    IV PiggyBack: 200 mL    Oral Fluid: 1240 mL  Total IN: 1440 mL    OUT:    Voided (mL): 1900 mL  Total OUT: 1900 mL    Total NET: -460 mL        PHYSICAL EXAM:  GEN: appears stated age, NAD  NEURO: awake, poor memory   HEENT:    Head: NC/AT  Face: symmetric facial movements bilaterally, intact CN VII bilaterally  Eyes: EOMI  Nose; Clear to anterior rhinoscopy  Ears: Auricles well formed, no mastoid tip tenderness   Oral cavity/oropharynx: moist mucous membranes, intraoral sutures intact floor of mouth tender to palpation on left side, not raised. Symmetric elevation of palate, posterior oropharynx visualized  Neck: penrose with SS output with surrounding induration,  tenderness to palpation most prominent left submental. Neck is soft. Full Range of motion of neck  CVS: regular rate and rhythm  Pulm: normal respiratory excursions, not tachypneic, no labored breathing  Abd: non-distended  Ext: moving all four extremities, no peripheral edema noted         LABS                       11.1   5.75  )-----------( 153      ( 10 Mar 2024 05:30 )             34.8    03-10    140  |  102  |  12  ----------------------------<  94  4.1   |  28  |  0.92    Ca    9.7      10 Mar 2024 05:30  Phos  3.4       Mg     1.9                Coagulation Studies-     Urinalysis Basic - ( 10 Mar 2024 05:30 )    Color: x / Appearance: x / SG: x / pH: x  Gluc: 94 mg/dL / Ketone: x  / Bili: x / Urobili: x   Blood: x / Protein: x / Nitrite: x   Leuk Esterase: x / RBC: x / WBC x   Sq Epi: x / Non Sq Epi: x / Bacteria: x      Endocrine Panel-  Calcium: 9.7 mg/dL (03-10 @ 05:30)      MICROBIOLOGY:  Culture - Surgical Swab (collected 24 @ 09:10)  Source: .Surgical Swab #2-INRTA ORAL CULTURE  Gram Stain (24 @ 16:43):    Few Gram positive cocci in pairs    Few Gram Negative Rods    Few White blood cells  Final Report (24 @ 15:09):    Culture consistent with oral srini    Moderate Rothia mucilaginosa    Few Neisseria sicca    Few Streptococcus mitis/oralis group    Few Streptococcus parasanguinis    Rare Haemophilus parainfluenzae    Few Candida albicans    Few Prevotella species    Culture grew 3 or more types of organisms which indicate collection    contamination; consider recollection only if clinically indicated.    Culture - Surgical Swab (collected 24 @ 09:10)  Source: .Surgical Swab #1-ANTERIOR MANDIBLE CULTURE  Gram Stain (24 @ 16:44):    Rare Gram positive cocci in pairs    Moderate White blood cells  Final Report (24 @ 15:13):    Few Streptococcus intermedius  Organism: Streptococcus intermedius  Streptococcus intermedius (24 @ 15:13)  Organism: Streptococcus intermedius (24 @ 15:13)      Method Type: ETEST      -  Ceftriaxone: S 0.032      -  Penicillin: S 0.016  Organism: Streptococcus intermedius (24 @ 15:13)      Method Type: KB      Culture Results:   Few Streptococcus intermedius (24 @ 09:10)  Culture Results:   Culture is being performed. (24 @ 09:10)  Culture Results:   Culture is being performed. Fungal cultures are held for 4 weeks. (24 @ 09:10)  Culture Results:   Culture consistent with oral srini  Moderate Rothia mucilaginosa  Few Neisseria sicca  Few Streptococcus mitis/oralis group  Few Streptococcus parasanguinis  Rare Haemophilus parainfluenzae  Few Candida albicans  Few Prevotella species  Culture grew 3 or more types of organisms which indicate collection  contamination; consider recollection only if clinically indicated. (24 @ 09:10)  Culture Results:   Culture is being performed. (24 @ 09:10)  Culture Results:   Culture is being performed. Fungal cultures are held for 4 weeks. (24 @ 09:10)      Culture - Fungal, Other (collected 24 @ 09:10)  Source: .Other FUNGAL-#2-INRTA ORAL CULTURE  Preliminary Report (24 @ 15:04):    Culture is being performed. Fungal cultures are held for 4 weeks.    Culture - Acid Fast - Other w/Smear (collected 24 @ 09:10)  Source: .Other AFB-#2-INRTA ORAL CULTURE  Preliminary Report (24 @ 15:10):    Culture is being performed.    Culture - Surgical Swab (collected 24 @ 09:10)  Source: .Surgical Swab #2-INRTA ORAL CULTURE  Gram Stain (24 @ 16:43):    Few Gram positive cocci in pairs    Few Gram Negative Rods    Few White blood cells  Final Report (24 @ 15:09):    Culture consistent with oral srini    Moderate Rothia mucilaginosa    Few Neisseria sicca    Few Streptococcus mitis/oralis group    Few Streptococcus parasanguinis    Rare Haemophilus parainfluenzae    Few Candida albicans    Few Prevotella species    Culture grew 3 or more types of organisms which indicate collection    contamination; consider recollection only if clinically indicated.    Culture - Fungal, Other (collected 24 @ 09:10)  Source: .Other FUNGAL-#1-ANTERIOR MANDIBLE CULTURE  Preliminary Report (24 @ 15:04):    Culture is being performed. Fungal cultures are held for 4 weeks.    Culture - Acid Fast - Other w/Smear (collected 24 @ 09:10)  Source: .Other AFB-#1-ANTERIOR MANDIBLE CULTURE  Preliminary Report (24 @ 15:10):    Culture is being performed.    Culture - Surgical Swab (collected 24 @ 09:10)  Source: .Surgical Swab #1-ANTERIOR MANDIBLE CULTURE  Gram Stain (24 @ 16:44):    Rare Gram positive cocci in pairs    Moderate White blood cells  Final Report (24 @ 15:13):    Few Streptococcus intermedius  Organism: Streptococcus intermedius  Streptococcus intermedius (24 @ 15:13)  Organism: Streptococcus intermedius (24 @ 15:13)      Method Type: ETEST      -  Ceftriaxone: S 0.032      -  Penicillin: S 0.016  Organism: Streptococcus intermedius (24 @ 15:13)      Method Type: KB        RADIOLOGY & ADDITIONAL STUDIES:    Assessment and Plan:  FILIPPO VIERA is a  74yFemale with FOM abscess & ORN of jaw s/p L neck I&D & intraoral mandibular debridement 3/6. ID on board, patient will require a PICC and at least 6 weeks of IV abx.     PLAN:  - ID recs by phone: single lumen PICC for 6 weeks for ceftriaxone infusion  - Culture 3/6: strep intermedius sensitive to ceftriaxone and penicillin.  - Discontinue unasyn, start Ceftriaxone 2g every 24 h  - Pain control   - Monitor Penrose - will remove in office   - Follow up case management for 6 weeks home infusion via PICC line with visiting nurse service    Disposition: Home with visiting nurse and home infusion     [facility] at [phone] with any questions/concerns.

## 2024-03-10 NOTE — PROGRESS NOTE ADULT - ASSESSMENT
74M h/o osteoporosis s/p Denosumab c/b osteonecrosis of jaw, submandibular abscess s/p I&D x 2 p/w L submandibular abscess and L mandibular OM.  s/p I&D of mandible and abscess.  Mandible bone culture grew Strep intermedius and abscess culture grew oral srini.  Will require 6 weeks of IV abx to treat OM.    - stop unasyn  - start ceftriaxone 2g IV q24h    #for Home infusion  - set up home infusion with [facility] ([facility]) if covered by patient's insurance  - Place single lumen PICC line   - Discharge patient with ceftriaxone 2g IV q24h via [facility] (Specify in the script!!)  - Duration of antibiotics is 6 weeks ( 3/6 - 4/16/24 )  - Weekly labs: CMP, CBC, ESR, CRP faxed to ID office at [phone]  - Patient to follow up with Dr. pugh in 2 weeks ([address] [place] [address], [phone]), ID office will call patient to schedule     Thank you for your consult.  Please re-consult us or call us with questions.  Case d/w primary team.    Lidia Pugh MD, MS  Infectious Disease attending  office phone [phone]  For any questions during evening/weekend/holiday, please page ID on call

## 2024-03-10 NOTE — PROGRESS NOTE ADULT - SUBJECTIVE AND OBJECTIVE BOX
INFECTIOUS DISEASES CONSULT FOLLOW-UP NOTE    INTERVAL HPI/OVERNIGHT EVENTS:  no event overnight  patient feels well, pain improving at submandibular area    ROS:   Constitutional, eyes, ENT, cardiovascular, respiratory, gastrointestinal, genitourinary, integumentary, neurological, psychiatric and heme/lymph are otherwise negative other than noted above       ANTIBIOTICS/RELEVANT:    MEDICATIONS  (STANDING):  atorvastatin 10 milliGRAM(s) Oral at bedtime  cefTRIAXone   IVPB 2000 milliGRAM(s) IV Intermittent every 24 hours  memantine 10 milliGRAM(s) Oral two times a day  pantoprazole    Tablet 40 milliGRAM(s) Oral before breakfast  pentoxifylline 400 milliGRAM(s) Oral every 12 hours  vitamin E 800 International Unit(s) Oral daily  vitamin E 200 International Unit(s) Oral daily    MEDICATIONS  (PRN):  acetaminophen     Tablet .. 650 milliGRAM(s) Oral every 6 hours PRN Temp greater or equal to 38C (100.4F), Mild Pain (1 - 3)  HYDROmorphone  Injectable 0.5 milliGRAM(s) IV Push every 15 minutes PRN breakthrough pain  ibuprofen  Tablet. 400 milliGRAM(s) Oral every 6 hours PRN Moderate Pain (4 - 6)  melatonin 3 milliGRAM(s) Oral at bedtime PRN Insomnia  oxyCODONE    IR 2.5 milliGRAM(s) Oral every 6 hours PRN Moderate Pain (4 - 6)  oxyCODONE    IR 5 milliGRAM(s) Oral every 6 hours PRN Severe Pain (7 - 10)        Vital Signs Last 24 Hrs  T(C): 36.8 (10 Mar 2024 08:40), Max: 36.9 (09 Mar 2024 13:20)  T(F): 98.3 (10 Mar 2024 08:40), Max: 98.4 (09 Mar 2024 13:20)  HR: 63 (10 Mar 2024 08:40) (63 - 73)  BP: 127/81 (10 Mar 2024 08:40) (106/73 - 127/81)  BP(mean): --  RR: 17 (10 Mar 2024 08:40) (17 - 19)  SpO2: 97% (10 Mar 2024 08:40) (97% - 99%)    Parameters below as of 10 Mar 2024 08:40  Patient On (Oxygen Delivery Method): room air        03-09-24 @ 06:01  -  03-10-24 @ 07:00  --------------------------------------------------------  IN: 1440 mL / OUT: 1900 mL / NET: -460 mL    03-10-24 @ 07:01  -  03-10-24 @ 12:47  --------------------------------------------------------  IN: 50 mL / OUT: 0 mL / NET: 50 mL      PHYSICAL EXAM:  Constitutional: alert, NAD  Eyes: the sclera and conjunctiva were normal.   ENT: the ears and nose were normal in appearance.  penrose drain at L submandibular area, ttp   Neck: the appearance of the neck was normal and the neck was supple.   Pulmonary: no respiratory distress and lungs were clear to auscultation bilaterally.   Heart: heart rate was normal and rhythm regular, normal S1 and S2  Vascular:. there was no peripheral edema  Abdomen: normal bowel sounds, soft, non-tender          LABS:                        11.1   5.75  )-----------( 153      ( 10 Mar 2024 05:30 )             34.8     03-10    140  |  102  |  12  ----------------------------<  94  4.1   |  28  |  0.92    Ca    9.7      10 Mar 2024 05:30  Phos  3.4     03-09  Mg     1.9     03-09        Urinalysis Basic - ( 10 Mar 2024 05:30 )    Color: x / Appearance: x / SG: x / pH: x  Gluc: 94 mg/dL / Ketone: x  / Bili: x / Urobili: x   Blood: x / Protein: x / Nitrite: x   Leuk Esterase: x / RBC: x / WBC x   Sq Epi: x / Non Sq Epi: x / Bacteria: x        MICROBIOLOGY:  3/6/24 OR culture AFB: p  3/6/24 #1 Anterior mandible culture: Strep intermedius (S to CTX 0.032, Pen 0.016)  3/6/24 #2 Inrta oral culture: c/w oral srini, Rothia mucilaginosa, neisseriasicca, strep mitis oralis, strep parasanguinis, H flu, prevotella spp, C. albicans      RADIOLOGY & ADDITIONAL STUDIES:   3/5/24 CT neck soft tissue IV  Impression: Interval marked improvement in the floor of the mouth   abscess. However phlegmon along the anterior bilateral genioglossus   geniohyoid and left mylohyoid muscles. Interval appearance of soft tissue   phlegmon  surrounding the left parasymphysis and body of the mandible   predominantly within the left submandibular space. Marked increased   osteomyelitis involving the symphysis and right and left parasymphysis   and left body of the mandible with involvement of the inferior alveolar   canal.

## 2024-03-11 ENCOUNTER — TRANSCRIPTION ENCOUNTER (OUTPATIENT)
Age: 74
End: 2024-03-11

## 2024-03-11 LAB
ANION GAP SERPL CALC-SCNC: 7 MMOL/L — SIGNIFICANT CHANGE UP (ref 5–17)
BUN SERPL-MCNC: 13 MG/DL — SIGNIFICANT CHANGE UP (ref 7–23)
CALCIUM SERPL-MCNC: 9.5 MG/DL — SIGNIFICANT CHANGE UP (ref 8.4–10.5)
CHLORIDE SERPL-SCNC: 102 MMOL/L — SIGNIFICANT CHANGE UP (ref 96–108)
CO2 SERPL-SCNC: 28 MMOL/L — SIGNIFICANT CHANGE UP (ref 22–31)
CREAT SERPL-MCNC: 0.8 MG/DL — SIGNIFICANT CHANGE UP (ref 0.5–1.3)
EGFR: 78 ML/MIN/1.73M2 — SIGNIFICANT CHANGE UP
GLUCOSE SERPL-MCNC: 91 MG/DL — SIGNIFICANT CHANGE UP (ref 70–99)
HCT VFR BLD CALC: 36.2 % — SIGNIFICANT CHANGE UP (ref 34.5–45)
HGB BLD-MCNC: 11.4 G/DL — LOW (ref 11.5–15.5)
MCHC RBC-ENTMCNC: 27.2 PG — SIGNIFICANT CHANGE UP (ref 27–34)
MCHC RBC-ENTMCNC: 31.5 GM/DL — LOW (ref 32–36)
MCV RBC AUTO: 86.4 FL — SIGNIFICANT CHANGE UP (ref 80–100)
NRBC # BLD: 0 /100 WBCS — SIGNIFICANT CHANGE UP (ref 0–0)
PLATELET # BLD AUTO: 143 K/UL — LOW (ref 150–400)
POTASSIUM SERPL-MCNC: 4.1 MMOL/L — SIGNIFICANT CHANGE UP (ref 3.5–5.3)
POTASSIUM SERPL-SCNC: 4.1 MMOL/L — SIGNIFICANT CHANGE UP (ref 3.5–5.3)
RBC # BLD: 4.19 M/UL — SIGNIFICANT CHANGE UP (ref 3.8–5.2)
RBC # FLD: 14.1 % — SIGNIFICANT CHANGE UP (ref 10.3–14.5)
SODIUM SERPL-SCNC: 137 MMOL/L — SIGNIFICANT CHANGE UP (ref 135–145)
WBC # BLD: 5.65 K/UL — SIGNIFICANT CHANGE UP (ref 3.8–10.5)
WBC # FLD AUTO: 5.65 K/UL — SIGNIFICANT CHANGE UP (ref 3.8–10.5)

## 2024-03-11 PROCEDURE — 36556 INSERT NON-TUNNEL CV CATH: CPT

## 2024-03-11 PROCEDURE — 76937 US GUIDE VASCULAR ACCESS: CPT | Mod: 26,59

## 2024-03-11 RX ORDER — SODIUM CHLORIDE 9 MG/ML
10 INJECTION INTRAMUSCULAR; INTRAVENOUS; SUBCUTANEOUS
Refills: 0 | Status: DISCONTINUED | OUTPATIENT
Start: 2024-03-11 | End: 2024-03-12

## 2024-03-11 RX ORDER — CHLORHEXIDINE GLUCONATE 213 G/1000ML
1 SOLUTION TOPICAL
Refills: 0 | Status: DISCONTINUED | OUTPATIENT
Start: 2024-03-11 | End: 2024-03-12

## 2024-03-11 RX ADMIN — Medication 400 MILLIGRAM(S): at 06:32

## 2024-03-11 RX ADMIN — Medication 800 INTERNATIONAL UNIT(S): at 13:06

## 2024-03-11 RX ADMIN — Medication 3 MILLIGRAM(S): at 22:44

## 2024-03-11 RX ADMIN — ATORVASTATIN CALCIUM 10 MILLIGRAM(S): 80 TABLET, FILM COATED ORAL at 21:50

## 2024-03-11 RX ADMIN — MEMANTINE HYDROCHLORIDE 10 MILLIGRAM(S): 10 TABLET ORAL at 05:31

## 2024-03-11 RX ADMIN — OXYCODONE HYDROCHLORIDE 5 MILLIGRAM(S): 5 TABLET ORAL at 06:15

## 2024-03-11 RX ADMIN — CEFTRIAXONE 100 MILLIGRAM(S): 500 INJECTION, POWDER, FOR SOLUTION INTRAMUSCULAR; INTRAVENOUS at 10:39

## 2024-03-11 RX ADMIN — Medication 400 MILLIGRAM(S): at 05:32

## 2024-03-11 RX ADMIN — OXYCODONE HYDROCHLORIDE 5 MILLIGRAM(S): 5 TABLET ORAL at 21:50

## 2024-03-11 RX ADMIN — Medication 400 MILLIGRAM(S): at 18:33

## 2024-03-11 RX ADMIN — PANTOPRAZOLE SODIUM 40 MILLIGRAM(S): 20 TABLET, DELAYED RELEASE ORAL at 06:30

## 2024-03-11 RX ADMIN — Medication 400 MILLIGRAM(S): at 23:16

## 2024-03-11 RX ADMIN — OXYCODONE HYDROCHLORIDE 5 MILLIGRAM(S): 5 TABLET ORAL at 22:50

## 2024-03-11 RX ADMIN — OXYCODONE HYDROCHLORIDE 5 MILLIGRAM(S): 5 TABLET ORAL at 07:15

## 2024-03-11 RX ADMIN — Medication 200 INTERNATIONAL UNIT(S): at 13:07

## 2024-03-11 RX ADMIN — MEMANTINE HYDROCHLORIDE 10 MILLIGRAM(S): 10 TABLET ORAL at 18:33

## 2024-03-11 NOTE — PROGRESS NOTE ADULT - SUBJECTIVE AND OBJECTIVE BOX
OTOLARYNGOLOGY (ENT) PROGRESS NOTE    PATIENT: FILIPPO VIERA  MRN: [id number]  : 52  [id number]  DATE OF SERVICE:  24    ID: FILIPPO VIERA is a  74yFemale with  FOM abscess & ORN of jaw s/p L neck I&D & intraoral mandibular debridement 3/6.     Subjective/ Interval:   3/7: patient seen this morning,  intraoral sutures intact, penrose with minimal drainage . Pain starting to improve. Cx growing GPC and GNR. ID recommends 6 weeks of unasyn which she tolerated without issue.  3/8; afebrile,  minimal penrose drainage with surrounding induration,  will continue to follow up with ID in regards to abx   3/9: afebrile, patient seen on morning rounds, minimal penrose drainage, pending PICC placement for long term abx pending culture sensitivities   3/10: Afebrile, seen on morning rounds, minimal penrose drainage with manual pressure. Pending PICC placement for long term abx for strep intermedius sensitive to ceftriaxone and penicillin.  3/11: AFVSS. No acute events overnight. Patient seen and examined at bedside. Minimal drainage from IA, still pending PICC placement. Abx switched to Ceftriaxone yesterday.    PHYSICAL EXAM:  GEN: appears stated age, NAD  NEURO: awake, poor memory   HEENT:    Head: NC/AT  Face: symmetric facial movements bilaterally, intact CN VII bilaterally  Eyes: EOMI  Nose; Clear to anterior rhinoscopy  Ears: Auricles well formed, no mastoid tip tenderness   Oral cavity/oropharynx: moist mucous membranes, intraoral sutures intact floor of mouth tender to palpation on left side, not raised. Symmetric elevation of palate, posterior oropharynx visualized  Neck: penrose with SS output with surrounding induration,  tenderness to palpation most prominent left submental. Neck is soft. Full Range of motion of neck  CVS: regular rate and rhythm  Pulm: normal respiratory excursions, not tachypneic, no labored breathing  Abd: non-distended  Ext: moving all four extremities, no peripheral edema noted     LABS                       11.1   5.75  )-----------( 153      ( 10 Mar 2024 05:30 )             34.8    03-10    140  |  102  |  12  ----------------------------<  94  4.1   |  28  |  0.92    Ca    9.7      10 Mar 2024 05:30           Coagulation Studies-     Urinalysis Basic - ( 10 Mar 2024 05:30 )    Color: x / Appearance: x / SG: x / pH: x  Gluc: 94 mg/dL / Ketone: x  / Bili: x / Urobili: x   Blood: x / Protein: x / Nitrite: x   Leuk Esterase: x / RBC: x / WBC x   Sq Epi: x / Non Sq Epi: x / Bacteria: x      Endocrine Panel-                MICROBIOLOGY:  Culture - Surgical Swab (collected 24 @ 09:10)  Source: .Surgical Swab #2-INRTA ORAL CULTURE  Gram Stain (24 @ 16:43):    Few Gram positive cocci in pairs    Few Gram Negative Rods    Few White blood cells  Final Report (24 @ 15:09):    Culture consistent with oral srini    Moderate Rothia mucilaginosa    Few Neisseria sicca    Few Streptococcus mitis/oralis group    Few Streptococcus parasanguinis    Rare Haemophilus parainfluenzae    Few Candida albicans    Few Prevotella species    Culture grew 3 or more types of organisms which indicate collection    contamination; consider recollection only if clinically indicated.    Culture - Surgical Swab (collected 24 @ 09:10)  Source: .Surgical Swab #1-ANTERIOR MANDIBLE CULTURE  Gram Stain (24 @ 16:44):    Rare Gram positive cocci in pairs    Moderate White blood cells  Final Report (24 @ 15:13):    Few Streptococcus intermedius  Organism: Streptococcus intermedius  Streptococcus intermedius (24 @ 15:13)  Organism: Streptococcus intermedius (24 @ 15:13)      Method Type: ETEST      -  Ceftriaxone: S 0.032      -  Penicillin: S 0.016  Organism: Streptococcus intermedius (24 @ 15:13)      Method Type: KB      Culture Results:   Few Streptococcus intermedius (24 @ 09:10)  Culture Results:   Culture is being performed. (24 @ 09:10)  Culture Results:   Culture is being performed. Fungal cultures are held for 4 weeks. (24 @ 09:10)  Culture Results:   Culture consistent with oral srini  Moderate Rothia mucilaginosa  Few Neisseria sicca  Few Streptococcus mitis/oralis group  Few Streptococcus parasanguinis  Rare Haemophilus parainfluenzae  Few Candida albicans  Few Prevotella species  Culture grew 3 or more types of organisms which indicate collection  contamination; consider recollection only if clinically indicated. (24 @ 09:10)  Culture Results:   Culture is being performed. (24 @ 09:10)  Culture Results:   Culture is being performed. Fungal cultures are held for 4 weeks. (24 @ 09:10)      Culture - Fungal, Other (collected 24 @ 09:10)  Source: .Other FUNGAL-#2-INRTA ORAL CULTURE  Preliminary Report (24 @ 15:04):    Culture is being performed. Fungal cultures are held for 4 weeks.    Culture - Acid Fast - Other w/Smear (collected 24 @ 09:10)  Source: .Other AFB-#2-INRTA ORAL CULTURE  Preliminary Report (24 @ 15:10):    Culture is being performed.    Culture - Surgical Swab (collected 24 @ 09:10)  Source: .Surgical Swab #2-INRTA ORAL CULTURE  Gram Stain (24 @ 16:43):    Few Gram positive cocci in pairs    Few Gram Negative Rods    Few White blood cells  Final Report (24 @ 15:09):    Culture consistent with oral srini    Moderate Rothia mucilaginosa    Few Neisseria sicca    Few Streptococcus mitis/oralis group    Few Streptococcus parasanguinis    Rare Haemophilus parainfluenzae    Few Candida albicans    Few Prevotella species    Culture grew 3 or more types of organisms which indicate collection    contamination; consider recollection only if clinically indicated.    Culture - Fungal, Other (collected 24 @ 09:10)  Source: .Other FUNGAL-#1-ANTERIOR MANDIBLE CULTURE  Preliminary Report (24 @ 15:04):    Culture is being performed. Fungal cultures are held for 4 weeks.    Culture - Acid Fast - Other w/Smear (collected 24 @ 09:10)  Source: .Other AFB-#1-ANTERIOR MANDIBLE CULTURE  Preliminary Report (24 @ 15:10):    Culture is being performed.    Culture - Surgical Swab (collected 24 @ 09:10)  Source: .Surgical Swab #1-ANTERIOR MANDIBLE CULTURE  Gram Stain (24 @ 16:44):    Rare Gram positive cocci in pairs    Moderate White blood cells  Final Report (24 @ 15:13):    Few Streptococcus intermedius  Organism: Streptococcus intermedius  Streptococcus intermedius (24 @ 15:13)  Organism: Streptococcus intermedius (24 @ 15:13)      Method Type: ETEST      -  Ceftriaxone: S 0.032      -  Penicillin: S 0.016  Organism: Streptococcus intermedius (24 @ 15:13)      Method Type: KB

## 2024-03-11 NOTE — DISCHARGE NOTE PROVIDER - NSDCFUADDINST_GEN_ALL_CORE_FT
ENT Discharge Instructions    ENT follow up appointment:  - please call the office to confirm appointment:   "- Discharge patient with ceftriaxone 2g IV q24h via [facility]  - Duration of antibiotics is 6 weeks ( 3/6 - 4/16/24 ) - Weekly labs: CMP, CBC, ESR, CRP faxed to ID office at [phone] - Patient to follow up with Dr. fernando in 2 weeks ([address] [place] [address], [phone]), ID office will call patient to schedule D5"      *Please call your doctor or nurse practitioner if you have increased pain, swelling, redness, or drainage from the incision site.  *Avoid swimming and submerging midline until your follow-up appointment.  *You may shower, and wash surgical incisions with a mild soap and warm water. Gently pat the area dry.    Activity:  - fatigue is common after surgery, rest if you feel tired   -Please get plenty of rest, continue to ambulate several times per day, and drink adequate amounts of fluids.   -Avoid lifting weights greater than 5-10 lbs until you follow-up with your surgeon, who will instruct you further regarding activity restrictions.  -Avoid driving or operating heavy machinery while taking pain medications.  - Walking is recommended, ambulate as tolerated      Pain Expectations:  - pain after surgery is expected  - please take pain meds as prescribed     Medications: Please resume all regular home medications unless specifically advised not to take a particular medication. Also, please take any new medications as prescribed.   - pain medications can cause constipation, you should eat a high fiber diet and may take a stool softener while on pain meds       Warning Signs:  Please call your doctor or nurse practitioner if you experience the following:  *You experience new chest pain, pressure, squeezing or tightness.  *New or worsening cough, shortness of breath, or wheeze.  *If you are vomiting and cannot keep down fluids or your medications.  *You are getting dehydrated due to continued vomiting, diarrhea, or other reasons. Signs of dehydration include dry mouth, rapid heartbeat, or feeling dizzy or faint when standing.  *Your pain is not improving within 8-12 hours or is not gone within 24 hours.  *You have shaking chills, or fever greater than 101.5 degrees Fahrenheit or 38 degrees Celsius.  *Any change in your symptoms, or any new symptoms that concern you.     PLEASE CALL THE OFFICE WITH ANY QUESTIONS OR CONCERNS:   ENT Discharge Instructions    ENT follow up appointment: [phone] PLease call the office to make an appt to get Penrose drain removed   - please call the office to confirm appointment:   "- Discharge patient with ceftriaxone 2g IV q24h via [facility]  - Duration of antibiotics is 6 weeks ( 3/6 - 4/16/24 ) - Weekly labs: CMP, CBC, ESR, CRP faxed to ID office at [phone] - Patient to follow up with Dr. fernando in 2 weeks ([address] [place] [address], [phone]), ID office will call patient to schedule D5"      *Please call your doctor or nurse practitioner if you have increased pain, swelling, redness, or drainage from the incision site.  *Avoid swimming and submerging midline until your follow-up appointment.  *You may shower, and wash surgical incisions with a mild soap and warm water. Gently pat the area dry.    Activity:  - fatigue is common after surgery, rest if you feel tired   -Please get plenty of rest, continue to ambulate several times per day, and drink adequate amounts of fluids.   -Avoid lifting weights greater than 5-10 lbs until you follow-up with your surgeon, who will instruct you further regarding activity restrictions.  -Avoid driving or operating heavy machinery while taking pain medications.  - Walking is recommended, ambulate as tolerated      Pain Expectations:  - pain after surgery is expected  - please take pain meds as prescribed     Medications: Please resume all regular home medications unless specifically advised not to take a particular medication. Also, please take any new medications as prescribed.   - pain medications can cause constipation, you should eat a high fiber diet and may take a stool softener while on pain meds       Warning Signs:  Please call your doctor or nurse practitioner if you experience the following:  *You experience new chest pain, pressure, squeezing or tightness.  *New or worsening cough, shortness of breath, or wheeze.  *If you are vomiting and cannot keep down fluids or your medications.  *You are getting dehydrated due to continued vomiting, diarrhea, or other reasons. Signs of dehydration include dry mouth, rapid heartbeat, or feeling dizzy or faint when standing.  *Your pain is not improving within 8-12 hours or is not gone within 24 hours.  *You have shaking chills, or fever greater than 101.5 degrees Fahrenheit or 38 degrees Celsius.  *Any change in your symptoms, or any new symptoms that concern you.     PLEASE CALL THE OFFICE WITH ANY QUESTIONS OR CONCERNS:   ENT Discharge Instructions    ENT follow up appointment: [phone] PLease call the office to make an appt to get Penrose drain removed   - please call the office to confirm appointment:   "- Discharge patient with ceftriaxone 2g IV q24h via [facility]  - Duration of antibiotics is 6 weeks ( 3/6 - 4/16/24 ) - Weekly labs: CMP, CBC, ESR, CRP faxed to ID office at [phone] - Patient to follow up with Dr. fernando in 2 weeks ([address] [place] [address], [phone]), ID office will call patient to schedule D5"    - Please change gauze and tape around penrose drain daily   *Please call your doctor or nurse practitioner if you have increased pain, swelling, redness, or drainage from the incision site.  *Avoid swimming and submerging midline until your follow-up appointment.  *You may shower, and wash surgical incisions with a mild soap and warm water. Gently pat the area dry.    Activity:  - fatigue is common after surgery, rest if you feel tired   -Please get plenty of rest, continue to ambulate several times per day, and drink adequate amounts of fluids.   -Avoid lifting weights greater than 5-10 lbs until you follow-up with your surgeon, who will instruct you further regarding activity restrictions.  -Avoid driving or operating heavy machinery while taking pain medications.  - Walking is recommended, ambulate as tolerated      Pain Expectations:  - pain after surgery is expected  - please take pain meds as prescribed     Medications: Please resume all regular home medications unless specifically advised not to take a particular medication. Also, please take any new medications as prescribed.   - pain medications can cause constipation, you should eat a high fiber diet and may take a stool softener while on pain meds       Warning Signs:  Please call your doctor or nurse practitioner if you experience the following:  *You experience new chest pain, pressure, squeezing or tightness.  *New or worsening cough, shortness of breath, or wheeze.  *If you are vomiting and cannot keep down fluids or your medications.  *You are getting dehydrated due to continued vomiting, diarrhea, or other reasons. Signs of dehydration include dry mouth, rapid heartbeat, or feeling dizzy or faint when standing.  *Your pain is not improving within 8-12 hours or is not gone within 24 hours.  *You have shaking chills, or fever greater than 101.5 degrees Fahrenheit or 38 degrees Celsius.  *Any change in your symptoms, or any new symptoms that concern you.     PLEASE CALL THE OFFICE WITH ANY QUESTIONS OR CONCERNS:

## 2024-03-11 NOTE — DISCHARGE NOTE PROVIDER - HOSPITAL COURSE
OTOLARYNGOLOGY (ENT) DISCHARGE SUMMARY    PATIENT: FILIPPO VIERA               : 52  MRN: [id number]  ADMISSION DATE: 24  Discharge Date: 24 @ 12:16  Attending Physician: Florencio Melgar    Admission Diagnosis:  NECK ABCESS        Status post:Incision and drainage, neck    Debridement of mandible         Chronic Conditions:  IBS (irritable bowel syndrome)    Hashimoto's thyroiditis    Osteoporosis        HPI:FILIPPO VIERA  is a 74 F  h/o osteoporosis s/p Denosumab c/b osteonecrosis of jaw, submandibular abscess s/p I&D x 2 p/w L submandibular abscess and L mandibular OM.  s/p I&D of mandible and abscess.  Mandible bone culture grew Strep intermedius and abscess culture grew oral srini.  Will require 6 weeks of IV abx to treat OM.        Subjective/ Interval:   3/7: patient seen this morning,  intraoral sutures intact, penrose with minimal drainage . Pain starting to improve. Cx growing GPC and GNR. ID recommends 6 weeks of unasyn which she tolerated without issue.  3/8; afebrile,  minimal penrose drainage with surrounding induration,  will continue to follow up with ID in regards to abx   3/9: afebrile, patient seen on morning rounds, minimal penrose drainage, pending PICC placement for long term abx pending culture sensitivities   3/10: Afebrile, seen on morning rounds, minimal penrose drainage with manual pressure. Pending PICC placement for long term abx for strep intermedius sensitive to ceftriaxone and penicillin.  3/11: AFVSS. No acute events overnight. Patient seen and examined at bedside. Minimal drainage from SC, still pending PICC placement. Abx switched to Ceftriaxone yesterday.     Disposition: Home with family.    Discharge Condition: Stable

## 2024-03-11 NOTE — PROVIDER CONTACT NOTE (OTHER) - ACTION/TREATMENT ORDERED:
(MD) Rick Ryan informed RN that she will be coming to the unit shortly for morning rounds to assess her.

## 2024-03-11 NOTE — DIETITIAN INITIAL EVALUATION ADULT - PERSON TAUGHT/METHOD
Pt amenable to education; RD provided education in regards to the importance of adequate macro and micronutrients, as well as hydration to support ADLs, maintain energy levels and overall functional/nutritional status. General healthful education provided. Nutrient-dense foods promoted. Pt's diet/texture reviewed. Pt's elevated needs discussed. Pt was open to implementing Ensure oral nutrition supplements for additional calories, protein and nutrients. Pt was receptive and verbalized understanding./verbal instruction/patient instructed

## 2024-03-11 NOTE — DIETITIAN INITIAL EVALUATION ADULT - PERTINENT MEDS FT
MEDICATIONS  (STANDING):  atorvastatin 10 milliGRAM(s) Oral at bedtime  cefTRIAXone   IVPB 2000 milliGRAM(s) IV Intermittent every 24 hours  memantine 10 milliGRAM(s) Oral two times a day  pantoprazole    Tablet 40 milliGRAM(s) Oral before breakfast  pentoxifylline 400 milliGRAM(s) Oral every 12 hours  vitamin E 800 International Unit(s) Oral daily  vitamin E 200 International Unit(s) Oral daily    MEDICATIONS  (PRN):  acetaminophen     Tablet .. 650 milliGRAM(s) Oral every 6 hours PRN Temp greater or equal to 38C (100.4F), Mild Pain (1 - 3)  HYDROmorphone  Injectable 0.5 milliGRAM(s) IV Push every 15 minutes PRN breakthrough pain  ibuprofen  Tablet. 400 milliGRAM(s) Oral every 6 hours PRN Moderate Pain (4 - 6)  melatonin 3 milliGRAM(s) Oral at bedtime PRN Insomnia  oxyCODONE    IR 5 milliGRAM(s) Oral every 6 hours PRN Severe Pain (7 - 10)  oxyCODONE    IR 2.5 milliGRAM(s) Oral every 6 hours PRN Moderate Pain (4 - 6)

## 2024-03-11 NOTE — PROCEDURE NOTE - ESTIMATED BLOOD LOSS
Pt called to request lab orders for upcoming appt on [email or web address] w/Dr. Nicky Reed. Pt has scheduled lab appt for [email or web address]. Please advise.     LEE#[phone] Minimal

## 2024-03-11 NOTE — DISCHARGE NOTE PROVIDER - NSDCCPCAREPLAN_GEN_ALL_CORE_FT
PRINCIPAL DISCHARGE DIAGNOSIS  Diagnosis: Mandibular abscess  Assessment and Plan of Treatment:

## 2024-03-11 NOTE — DISCHARGE NOTE PROVIDER - CARE PROVIDER_API CALL
Florencio Melgar.  Otolaryngology  [address], [facility] - Dept [place] [address]  Phone: [phone]  Fax: [phone]  Follow Up Time:     Lidia Pugh  Infectious Disease  [address]  [place] [address]  Phone: [phone]  Fax: [phone]  Follow Up Time:

## 2024-03-11 NOTE — DIETITIAN INITIAL EVALUATION ADULT - PERTINENT LABORATORY DATA
03-11    137  |  102  |  13  ----------------------------<  91  4.1   |  28  |  0.80    Ca    9.5      11 Mar 2024 05:30    A1C with Estimated Average Glucose Result: 5.9 % (01-11-24 @ 05:55)

## 2024-03-11 NOTE — DISCHARGE NOTE PROVIDER - CARE PROVIDERS DIRECT ADDRESSES
[email or web address]

## 2024-03-11 NOTE — PROCEDURE NOTE - NSMIDLINEBRANDFT_VASC_A_CORE
[facility] picc lot#: [id number] [facility] lot#:[id number]

## 2024-03-11 NOTE — DIETITIAN INITIAL EVALUATION ADULT - ADD RECOMMEND
1. Continue with current diet order (soft and bite-sized diet)  **provide Ensure plus high protein oral nutrition supplement 2x/day (350 calories, 20gprotein per serving)  2. Encourage pt to meet nutritional needs as able  3. Monitor PO intakes, trend weights (weekly), monitor skin integrity, monitor labs (electrolytes, CMP), monitor GI function  4. Encourage adherence to diet education (reinforce as able)  5. Continue vitamin E supplementation  6. Pain and bowel regimen per team  7. Will continue to assess/honor preferences as able   8. Align nutrition interventions with goals of care at all times

## 2024-03-11 NOTE — PROGRESS NOTE ADULT - ASSESSMENT
Assessment and Plan:  FILIPPO VIERA is a  74yFemale with FOM abscess & ORN of jaw s/p L neck I&D & intraoral mandibular debridement 3/6. ID on board, patient will require a PICC and at least 6 weeks of IV abx.     PLAN:  - ID recs by phone: single lumen PICC for 6 weeks for ceftriaxone infusion  - Culture 3/6: strep intermedius sensitive to ceftriaxone and penicillin.  - Ceftriaxone 2g every 24 h  - Pain control   - Monitor Penrose - will remove in office   - Follow up case management for 6 weeks home infusion via PICC line with visiting nurse service    Disposition: Home with visiting nurse and home infusion     [facility] at [phone] with any questions/concerns.

## 2024-03-11 NOTE — DIETITIAN INITIAL EVALUATION ADULT - OTHER INFO
74 year old woman with PMH osteoporosis, with suspected left neck abscess. Patient was recently admitted at [facility] 1/2024 for medical management of  facial and neck swelling. Her symptoms began in September 2023 after a dental extraction with subsequent left neck/floor of mouth abscess. She also received Prolia injection last year and have developed MRONJ.  CT neck at that time showed abnormal enhancement in the floor of mouth and the area was drained and washed out on 1/11. She is s/p 10 day course of Clindamycin but states that within the last month the infection has recurred and is worsening. Patient was seen in ENT clinic 3/5/24 for her symptoms and sent to [facility] for direct admission for presurgical workup of left neck abscess.     Pt seen in room for nutrition assessment. Pt reports fair appetite PTA and during hospital stay. As per diet recall PTA: pt endorsed she eats yogurt, boiled eggs, bananas, soft foods. Currently on a soft and bite-sized diet, tolerating well, noted with fair, ~55-75% PO intakes overall. No cultural, Cheondoism, or ethnic food preferences noted. No known food allergies. Denies significant wt changes, reports wt stability at current wt. Dosing wt: 117.5 pounds, Ideal body weight: 120 pounds, pt is 98% of ideal body weight. Denies nausea, vomiting, diarrhea, constipation, last BM this morning per pt. No edema. Skin: surgical incisions to jaw and neck noted, no pressure ulcers. Satish: 19. No issues chewing or swallowing noted. Noted with moderate level (level 6) of pain. Labs reviewed: no abnormal nutrition-related labs; RD to continue to monitor trends. Nutritionally pertinent medications/supplements: IV antibiotics, vitamin E, protonix. RD observed pt with no overt signs of muscle or fat wasting. Based on ASPEN guidelines, pt does not meet criteria for malnutrition at this time. Pt amenable to education; RD provided education in regards to the importance of adequate macro and micronutrients, as well as hydration to support ADLs, maintain energy levels and overall functional/nutritional status. General healthful education provided. Nutrient-dense foods promoted. Pt's diet/texture reviewed. Pt's elevated needs discussed. Pt was open to implementing Ensure oral nutrition supplements for additional calories, protein and nutrients. Pt was receptive and verbalized understanding. No additional nutrition-related concerns. Will continue to follow. Additional nutrition recommendations below to follow.

## 2024-03-12 VITALS
OXYGEN SATURATION: 98 % | TEMPERATURE: 98 F | HEART RATE: 67 BPM | SYSTOLIC BLOOD PRESSURE: 103 MMHG | DIASTOLIC BLOOD PRESSURE: 63 MMHG | RESPIRATION RATE: 16 BRPM

## 2024-03-12 LAB
ANION GAP SERPL CALC-SCNC: 7 MMOL/L — SIGNIFICANT CHANGE UP (ref 5–17)
BUN SERPL-MCNC: 13 MG/DL — SIGNIFICANT CHANGE UP (ref 7–23)
CALCIUM SERPL-MCNC: 9.5 MG/DL — SIGNIFICANT CHANGE UP (ref 8.4–10.5)
CHLORIDE SERPL-SCNC: 104 MMOL/L — SIGNIFICANT CHANGE UP (ref 96–108)
CO2 SERPL-SCNC: 29 MMOL/L — SIGNIFICANT CHANGE UP (ref 22–31)
CREAT SERPL-MCNC: 0.85 MG/DL — SIGNIFICANT CHANGE UP (ref 0.5–1.3)
EGFR: 72 ML/MIN/1.73M2 — SIGNIFICANT CHANGE UP
GLUCOSE SERPL-MCNC: 94 MG/DL — SIGNIFICANT CHANGE UP (ref 70–99)
HCT VFR BLD CALC: 38.1 % — SIGNIFICANT CHANGE UP (ref 34.5–45)
HGB BLD-MCNC: 11.9 G/DL — SIGNIFICANT CHANGE UP (ref 11.5–15.5)
MAGNESIUM SERPL-MCNC: 2.1 MG/DL — SIGNIFICANT CHANGE UP (ref 1.6–2.6)
MCHC RBC-ENTMCNC: 27.4 PG — SIGNIFICANT CHANGE UP (ref 27–34)
MCHC RBC-ENTMCNC: 31.2 GM/DL — LOW (ref 32–36)
MCV RBC AUTO: 87.8 FL — SIGNIFICANT CHANGE UP (ref 80–100)
NRBC # BLD: 0 /100 WBCS — SIGNIFICANT CHANGE UP (ref 0–0)
PHOSPHATE SERPL-MCNC: 3.1 MG/DL — SIGNIFICANT CHANGE UP (ref 2.5–4.5)
PLATELET # BLD AUTO: 152 K/UL — SIGNIFICANT CHANGE UP (ref 150–400)
POTASSIUM SERPL-MCNC: 4 MMOL/L — SIGNIFICANT CHANGE UP (ref 3.5–5.3)
POTASSIUM SERPL-SCNC: 4 MMOL/L — SIGNIFICANT CHANGE UP (ref 3.5–5.3)
RBC # BLD: 4.34 M/UL — SIGNIFICANT CHANGE UP (ref 3.8–5.2)
RBC # FLD: 13.9 % — SIGNIFICANT CHANGE UP (ref 10.3–14.5)
SODIUM SERPL-SCNC: 140 MMOL/L — SIGNIFICANT CHANGE UP (ref 135–145)
WBC # BLD: 5.86 K/UL — SIGNIFICANT CHANGE UP (ref 3.8–10.5)
WBC # FLD AUTO: 5.86 K/UL — SIGNIFICANT CHANGE UP (ref 3.8–10.5)

## 2024-03-12 PROCEDURE — 80053 COMPREHEN METABOLIC PANEL: CPT

## 2024-03-12 PROCEDURE — 88311 DECALCIFY TISSUE: CPT

## 2024-03-12 PROCEDURE — 87184 SC STD DISK METHOD PER PLATE: CPT

## 2024-03-12 PROCEDURE — 85027 COMPLETE CBC AUTOMATED: CPT

## 2024-03-12 PROCEDURE — 86850 RBC ANTIBODY SCREEN: CPT

## 2024-03-12 PROCEDURE — 87075 CULTR BACTERIA EXCEPT BLOOD: CPT

## 2024-03-12 PROCEDURE — 85025 COMPLETE CBC W/AUTO DIFF WBC: CPT

## 2024-03-12 PROCEDURE — 84100 ASSAY OF PHOSPHORUS: CPT

## 2024-03-12 PROCEDURE — 83735 ASSAY OF MAGNESIUM: CPT

## 2024-03-12 PROCEDURE — 36573 INSJ PICC RS&I 5 YR+: CPT

## 2024-03-12 PROCEDURE — 87070 CULTURE OTHR SPECIMN AEROBIC: CPT

## 2024-03-12 PROCEDURE — 80048 BASIC METABOLIC PNL TOTAL CA: CPT

## 2024-03-12 PROCEDURE — 85610 PROTHROMBIN TIME: CPT

## 2024-03-12 PROCEDURE — 86901 BLOOD TYPING SEROLOGIC RH(D): CPT

## 2024-03-12 PROCEDURE — 86900 BLOOD TYPING SEROLOGIC ABO: CPT

## 2024-03-12 PROCEDURE — 85730 THROMBOPLASTIN TIME PARTIAL: CPT

## 2024-03-12 PROCEDURE — C9399: CPT

## 2024-03-12 PROCEDURE — 88305 TISSUE EXAM BY PATHOLOGIST: CPT

## 2024-03-12 PROCEDURE — 87206 SMEAR FLUORESCENT/ACID STAI: CPT

## 2024-03-12 PROCEDURE — 87102 FUNGUS ISOLATION CULTURE: CPT

## 2024-03-12 PROCEDURE — 87116 MYCOBACTERIA CULTURE: CPT

## 2024-03-12 PROCEDURE — 36415 COLL VENOUS BLD VENIPUNCTURE: CPT

## 2024-03-12 PROCEDURE — 87181 SC STD AGAR DILUTION PER AGT: CPT

## 2024-03-12 PROCEDURE — 70491 CT SOFT TISSUE NECK W/DYE: CPT | Mod: MC

## 2024-03-12 PROCEDURE — C1889: CPT

## 2024-03-12 RX ADMIN — PANTOPRAZOLE SODIUM 40 MILLIGRAM(S): 20 TABLET, DELAYED RELEASE ORAL at 06:25

## 2024-03-12 RX ADMIN — CHLORHEXIDINE GLUCONATE 1 APPLICATION(S): 213 SOLUTION TOPICAL at 07:51

## 2024-03-12 RX ADMIN — CEFTRIAXONE 100 MILLIGRAM(S): 500 INJECTION, POWDER, FOR SOLUTION INTRAMUSCULAR; INTRAVENOUS at 10:42

## 2024-03-12 RX ADMIN — Medication 800 INTERNATIONAL UNIT(S): at 13:37

## 2024-03-12 RX ADMIN — Medication 650 MILLIGRAM(S): at 01:08

## 2024-03-12 RX ADMIN — OXYCODONE HYDROCHLORIDE 5 MILLIGRAM(S): 5 TABLET ORAL at 05:15

## 2024-03-12 RX ADMIN — Medication 200 INTERNATIONAL UNIT(S): at 13:37

## 2024-03-12 RX ADMIN — MEMANTINE HYDROCHLORIDE 10 MILLIGRAM(S): 10 TABLET ORAL at 05:15

## 2024-03-12 RX ADMIN — Medication 400 MILLIGRAM(S): at 05:15

## 2024-03-12 RX ADMIN — Medication 400 MILLIGRAM(S): at 00:16

## 2024-03-12 RX ADMIN — Medication 650 MILLIGRAM(S): at 00:08

## 2024-03-12 NOTE — PROGRESS NOTE ADULT - SUBJECTIVE AND OBJECTIVE BOX
OTOLARYNGOLOGY (ENT) PROGRESS NOTE    PATIENT: FILIPPO VIERA  MRN: [id number]  : 52  [id number]  DATE OF SERVICE:  24  			         ID: FILIPPO VIERA is a  74yFemale with  FOM abscess & ORN of jaw s/p L neck I&D & intraoral mandibular debridement 3/6.     Subjective/ Interval:   3/7: patient seen this morning,  intraoral sutures intact, penrose with minimal drainage . Pain starting to improve. Cx growing GPC and GNR. ID recommends 6 weeks of unasyn which she tolerated without issue.  3/8; afebrile,  minimal penrose drainage with surrounding induration,  will continue to follow up with ID in regards to abx   3/9: afebrile, patient seen on morning rounds, minimal penrose drainage, pending PICC placement for long term abx pending culture sensitivities   3/10: Afebrile, seen on morning rounds, minimal penrose drainage with manual pressure. Pending PICC placement for long term abx for strep intermedius sensitive to ceftriaxone and penicillin.  3/11: AFVSS. No acute events overnight. Patient seen and examined at bedside. Minimal drainage from GA, still pending PICC placement. Abx switched to Ceftriaxone yesterday.  3/12: patient seen this morning,  PICC placed yesterday pending dose of CTX at 10 am than dc home with penrose drain and home IV infusion     ALLERGIES:  Levaquin (Unknown)  lidocaine (Unknown)      MEDICATIONS:  Antiinfectives:   cefTRIAXone   IVPB 2000 milliGRAM(s) IV Intermittent every 24 hours    IV fluids:  vitamin E 200 International Unit(s) Oral daily  vitamin E 800 International Unit(s) Oral daily    Hematologic/Anticoagulation:  pentoxifylline 400 milliGRAM(s) Oral every 12 hours    Pain medications/Neuro:  acetaminophen     Tablet .. 650 milliGRAM(s) Oral every 6 hours PRN  HYDROmorphone  Injectable 0.5 milliGRAM(s) IV Push every 15 minutes PRN  ibuprofen  Tablet. 400 milliGRAM(s) Oral every 6 hours PRN  melatonin 3 milliGRAM(s) Oral at bedtime PRN  memantine 10 milliGRAM(s) Oral two times a day  oxyCODONE    IR 5 milliGRAM(s) Oral every 6 hours PRN  oxyCODONE    IR 2.5 milliGRAM(s) Oral every 6 hours PRN    Endocrine Medications:   atorvastatin 10 milliGRAM(s) Oral at bedtime    All other standing medications:   chlorhexidine 2% Cloths 1 Application(s) Topical <User Schedule>  pantoprazole    Tablet 40 milliGRAM(s) Oral before breakfast    All other PRN medications:    Vital Signs Last 24 Hrs  T(C): 36.6 (12 Mar 2024 04:35), Max: 36.8 (11 Mar 2024 13:05)  T(F): 97.8 (12 Mar 2024 04:35), Max: 98.3 (11 Mar 2024 16:35)  HR: 64 (12 Mar 2024 04:35) (57 - 72)  BP: 131/85 (12 Mar 2024 04:35) (101/60 - 136/83)  BP(mean): --  RR: 18 (12 Mar 2024 04:35) (17 - 18)  SpO2: 98% (12 Mar 2024 04:35) (97% - 99%)    Parameters below as of 12 Mar 2024 04:35  Patient On (Oxygen Delivery Method): room air          03-11 @ 07:01  -  03-12 @ 07:00  --------------------------------------------------------  IN:    Oral Fluid: 1220 mL  Total IN: 1220 mL    OUT:    Voided (mL): 1500 mL  Total OUT: 1500 mL    Total NET: -280 mL            PHYSICAL EXAM:  GEN: appears stated age, NAD  NEURO: awake, poor memory   HEENT:    Head: NC/AT  Face: symmetric facial movements bilaterally, intact CN VII bilaterally  Eyes: EOMI  Nose; Clear to anterior rhinoscopy  Ears: Auricles well formed,   Oral cavity/oropharynx: moist mucous membranes, intraoral sutures intact floor of mouth tender to palpation on left side, not raised. Symmetric elevation of palate, posterior oropharynx visualized  Neck: penrose with SS output with surrounding induration,  tenderness to palpation most prominent left submental. Neck is soft. Full Range of motion of neck  CVS: regular rate and rhythm  Pulm: normal respiratory excursions, not tachypneic, no labored breathing  Abd: non-distended  Ext: moving all four extremities, no peripheral edema noted         LABS                       11.9   5.86  )-----------( 152      ( 12 Mar 2024 05:30 )             38.1        137  |  102  |  13  ----------------------------<  91  4.1   |  28  |  0.80    Ca    9.5      11 Mar 2024 05:30           Coagulation Studies-     Urinalysis Basic - ( 11 Mar 2024 05:30 )    Color: x / Appearance: x / SG: x / pH: x  Gluc: 91 mg/dL / Ketone: x  / Bili: x / Urobili: x   Blood: x / Protein: x / Nitrite: x   Leuk Esterase: x / RBC: x / WBC x   Sq Epi: x / Non Sq Epi: x / Bacteria: x      Endocrine Panel-                MICROBIOLOGY:  Culture - Surgical Swab (collected 24 @ 09:10)  Source: .Surgical Swab #2-INRTA ORAL CULTURE  Gram Stain (24 @ 16:43):    Few Gram positive cocci in pairs    Few Gram Negative Rods    Few White blood cells  Final Report (24 @ 15:09):    Culture consistent with oral srini    Moderate Rothia mucilaginosa    Few Neisseria sicca    Few Streptococcus mitis/oralis group    Few Streptococcus parasanguinis    Rare Haemophilus parainfluenzae    Few Candida albicans    Few Prevotella species    Culture grew 3 or more types of organisms which indicate collection    contamination; consider recollection only if clinically indicated.    Culture - Surgical Swab (collected 24 @ 09:10)  Source: .Surgical Swab #1-ANTERIOR MANDIBLE CULTURE  Gram Stain (24 @ 16:44):    Rare Gram positive cocci in pairs    Moderate White blood cells  Final Report (24 @ 15:13):    Few Streptococcus intermedius  Organism: Streptococcus intermedius  Streptococcus intermedius (24 @ 15:13)  Organism: Streptococcus intermedius (24 @ 15:13)      Method Type: ETEST      -  Ceftriaxone: S 0.032      -  Penicillin: S 0.016  Organism: Streptococcus intermedius (24 @ 15:13)      Method Type: KB      Culture Results:   Few Streptococcus intermedius (24 @ 09:10)  Culture Results:   Culture is being performed. (24 @ 09:10)  Culture Results:   Culture is being performed. Fungal cultures are held for 4 weeks. (24 @ 09:10)  Culture Results:   Culture consistent with oral srini  Moderate Rothia mucilaginosa  Few Neisseria sicca  Few Streptococcus mitis/oralis group  Few Streptococcus parasanguinis  Rare Haemophilus parainfluenzae  Few Candida albicans  Few Prevotella species  Culture grew 3 or more types of organisms which indicate collection  contamination; consider recollection only if clinically indicated. (24 @ 09:10)  Culture Results:   Culture is being performed. (24 @ 09:10)  Culture Results:   Culture is being performed. Fungal cultures are held for 4 weeks. (24 @ 09:10)      Culture - Fungal, Other (collected 24 @ 09:10)  Source: .Other FUNGAL-#2-INRTA ORAL CULTURE  Preliminary Report (24 @ 15:04):    Culture is being performed. Fungal cultures are held for 4 weeks.    Culture - Acid Fast - Other w/Smear (collected 24 @ 09:10)  Source: .Other AFB-#2-INRTA ORAL CULTURE  Preliminary Report (24 @ 15:10):    Culture is being performed.    Culture - Surgical Swab (collected 24 @ 09:10)  Source: .Surgical Swab #2-INRTA ORAL CULTURE  Gram Stain (24 @ 16:43):    Few Gram positive cocci in pairs    Few Gram Negative Rods    Few White blood cells  Final Report (24 @ 15:09):    Culture consistent with oral srini    Moderate Rothia mucilaginosa    Few Neisseria sicca    Few Streptococcus mitis/oralis group    Few Streptococcus parasanguinis    Rare Haemophilus parainfluenzae    Few Candida albicans    Few Prevotella species    Culture grew 3 or more types of organisms which indicate collection    contamination; consider recollection only if clinically indicated.    Culture - Fungal, Other (collected 24 @ 09:10)  Source: .Other FUNGAL-#1-ANTERIOR MANDIBLE CULTURE  Preliminary Report (24 @ 15:04):    Culture is being performed. Fungal cultures are held for 4 weeks.    Culture - Acid Fast - Other w/Smear (collected 24 @ 09:10)  Source: .Other AFB-#1-ANTERIOR MANDIBLE CULTURE  Preliminary Report (24 @ 15:10):    Culture is being performed.    Culture - Surgical Swab (collected 24 @ 09:10)  Source: .Surgical Swab #1-ANTERIOR MANDIBLE CULTURE  Gram Stain (24 @ 16:44):    Rare Gram positive cocci in pairs    Moderate White blood cells  Final Report (24 @ 15:13):    Few Streptococcus intermedius  Organism: Streptococcus intermedius  Streptococcus intermedius (24 @ 15:13)  Organism: Streptococcus intermedius (24 @ 15:13)      Method Type: ETEST      -  Ceftriaxone: S 0.032      -  Penicillin: S 0.016  Organism: Streptococcus intermedius (24 @ 15:13)      Method Type: KB

## 2024-03-12 NOTE — PROGRESS NOTE ADULT - REASON FOR ADMISSION
Preoperative work up

## 2024-03-12 NOTE — PROGRESS NOTE ADULT - ASSESSMENT
Assessment and Plan:  FILIPPO VIERA is a  74yFemale with FOM abscess & ORN of jaw s/p L neck I&D & intraoral mandibular debridement 3/6. ID on board, patient will require a PICC and at least 6 weeks of IV abx.     PLAN:  - ID recs: single lumen PICC for 6 weeks for ceftriaxone infusion  - Culture 3/6: strep intermedius sensitive to ceftriaxone and penicillin.  - Ceftriaxone 2g every 24 h  - Pain control   - Monitor Penrose - will remove in office   - Follow up case management for 6 weeks home infusion via PICC line with visiting nurse service    [facility] at [phone] with any questions/concerns.    Hyacinth Miller PA-C  03-12-24 @ 07:32

## 2024-03-13 LAB — SURGICAL PATHOLOGY STUDY: SIGNIFICANT CHANGE UP

## 2024-03-13 RX ORDER — OXYCODONE HYDROCHLORIDE 5 MG/1
1 TABLET ORAL
Qty: 6 | Refills: 0
Start: 2024-03-13 | End: 2024-03-14

## 2024-03-19 ENCOUNTER — APPOINTMENT (OUTPATIENT)
Dept: OTOLARYNGOLOGY | Facility: CLINIC | Age: 74
End: 2024-03-19
Payer: MEDICARE

## 2024-03-19 VITALS
BODY MASS INDEX: 20.73 KG/M2 | TEMPERATURE: 97.8 F | SYSTOLIC BLOOD PRESSURE: 129 MMHG | OXYGEN SATURATION: 97 % | WEIGHT: 117 LBS | HEART RATE: 67 BPM | DIASTOLIC BLOOD PRESSURE: 72 MMHG | HEIGHT: 63 IN

## 2024-03-19 PROCEDURE — 99024 POSTOP FOLLOW-UP VISIT: CPT

## 2024-03-19 RX ORDER — PNV NO.95/FERROUS FUM/FOLIC AC 28MG-0.8MG
400 TABLET ORAL
Qty: 60 | Refills: 3 | Status: ACTIVE | COMMUNITY
Start: 2024-03-19 | End: 1900-01-01

## 2024-03-19 RX ORDER — PENTOXIFYLLINE 400 MG/1
400 TABLET, EXTENDED RELEASE ORAL TWICE DAILY
Qty: 60 | Refills: 0 | Status: ACTIVE | COMMUNITY
Start: 2024-03-19 | End: 1900-01-01

## 2024-03-20 DIAGNOSIS — E06.3 AUTOIMMUNE THYROIDITIS: ICD-10-CM

## 2024-03-20 DIAGNOSIS — L02.11 CUTANEOUS ABSCESS OF NECK: ICD-10-CM

## 2024-03-20 DIAGNOSIS — Z88.1 ALLERGY STATUS TO OTHER ANTIBIOTIC AGENTS STATUS: ICD-10-CM

## 2024-03-20 DIAGNOSIS — M27.2 INFLAMMATORY CONDITIONS OF JAWS: ICD-10-CM

## 2024-03-20 DIAGNOSIS — M81.0 AGE-RELATED OSTEOPOROSIS WITHOUT CURRENT PATHOLOGICAL FRACTURE: ICD-10-CM

## 2024-03-20 DIAGNOSIS — K12.2 CELLULITIS AND ABSCESS OF MOUTH: ICD-10-CM

## 2024-03-21 NOTE — REASON FOR VISIT
[de-identified] : Patient is doing quite well after incision and drainage and debridement of mandible and submental area. I removed her drain in the office today. There is no obvious drainage. She is currently undergoing six weeks of antibiotic therapy with Ceftriaxone via the infectious disease service. She has a left PICC line in place. We have also started her on pentoxifylline and tocepherol as medical therapy for medication related osteonecrosis of the jaws. I will see her back in 3 to 4 weeks to evaluate progress. We may need to repeat her imaging at that time depending on her symptomatology.  Florencio Melgar DDS MD FACS Professor and Chair Department of Otolaryngology Head and Neck Surgery [facility]

## 2024-04-16 ENCOUNTER — APPOINTMENT (OUTPATIENT)
Dept: INTERNAL MEDICINE | Facility: CLINIC | Age: 74
End: 2024-04-16
Payer: MEDICARE

## 2024-04-16 VITALS
SYSTOLIC BLOOD PRESSURE: 131 MMHG | WEIGHT: 117 LBS | OXYGEN SATURATION: 98 % | DIASTOLIC BLOOD PRESSURE: 80 MMHG | HEIGHT: 63 IN | TEMPERATURE: 97.9 F | BODY MASS INDEX: 20.73 KG/M2 | HEART RATE: 84 BPM

## 2024-04-16 DIAGNOSIS — R41.89 OTHER SYMPTOMS AND SIGNS INVOLVING COGNITIVE FUNCTIONS AND AWARENESS: ICD-10-CM

## 2024-04-16 DIAGNOSIS — G47.00 INSOMNIA, UNSPECIFIED: ICD-10-CM

## 2024-04-16 DIAGNOSIS — R41.3 OTHER AMNESIA: ICD-10-CM

## 2024-04-16 DIAGNOSIS — M87.9 OSTEONECROSIS, UNSPECIFIED: ICD-10-CM

## 2024-04-16 DIAGNOSIS — M79.7 FIBROMYALGIA: ICD-10-CM

## 2024-04-16 PROCEDURE — 99214 OFFICE O/P EST MOD 30 MIN: CPT

## 2024-04-16 PROCEDURE — G2211 COMPLEX E/M VISIT ADD ON: CPT

## 2024-04-16 RX ORDER — CLONAZEPAM 0.25 MG/1
0.25 TABLET, ORALLY DISINTEGRATING ORAL DAILY
Qty: 20 | Refills: 0 | Status: ACTIVE | COMMUNITY
Start: 2024-04-16 | End: 1900-01-01

## 2024-04-16 NOTE — ASSESSMENT
[FreeTextEntry1] : Pt w early dementia; Recent ONJ  with prolia; s/p surgeries /I & D, drainage then 6 wks IV  Abx now recovered. Last Dexa 2/23 was o.penia, mild. Stay off agents for now. Will see Dr. Pizano tomorrow for nocturia, advised again to avoid liquids after 6 pm. Does not eat salty diet or drink etoh. We discussed that some OAB agents may worsen dementia- Attempting to get Lyqembi (new Alzheimers MAB infusion) from dr. Epstein. PICC line out tomorrow,  requests help w insomnia- while agents can worsen memory, sleep deprivation can also worsen memory, in addition to her severe anxiety; will try clonopin .25 qhs, she will let me know if any relief.  f/u 2 mos

## 2024-04-16 NOTE — HISTORY OF PRESENT ILLNESS
[FreeTextEntry1] : f/u  here w partner Sy [de-identified] : Pt s/p hosp last mo s/p incision and drainage and debridement of mandible and submental area with drains and 6 wks IV Abx Ceftriaxonw, via PICC line which will be removed tomorrow. Also  on pentoxifylline and tocepherol as medical therapy for medication related osteonecrosis of the jaws. Seeing Dr. Melgar at  [facility]. Doing much better, back at work. Eating well, incl Ensure. Seeing Urogyn for nocturia/overactive bladder. Not sleeping well, gets up 4-5x/night to urinate. Takes Zquill w some relief. We've tried Ambien, Mirtazapine, Xanax, Trazadone in the past. Has a lot of anxiety, hasn't seen psych yet, referrals given a few x in the past. Seeing Dr. Epstein, apparently the Laqembi was approved and awaiting infusion, still on Namentine. Saw Cardiol Dr. Marroquin, had Holter, no a.fib or othe rissues.

## 2024-04-16 NOTE — PHYSICAL EXAM
[No Acute Distress] : no acute distress [Well Nourished] : well nourished [Well Developed] : well developed [Well-Appearing] : well-appearing [Normal Sclera/Conjunctiva] : normal sclera/conjunctiva [PERRL] : pupils equal round and reactive to light [EOMI] : extraocular movements intact [Normal Outer Ear/Nose] : the outer ears and nose were normal in appearance [Normal Oropharynx] : the oropharynx was normal [No JVD] : no jugular venous distention [No Lymphadenopathy] : no lymphadenopathy [Supple] : supple [Thyroid Normal, No Nodules] : the thyroid was normal and there were no nodules present [No Respiratory Distress] : no respiratory distress  [No Accessory Muscle Use] : no accessory muscle use [Clear to Auscultation] : lungs were clear to auscultation bilaterally [Normal Rate] : normal rate  [Regular Rhythm] : with a regular rhythm [Normal S1, S2] : normal S1 and S2 [No Murmur] : no murmur heard [No Carotid Bruits] : no carotid bruits [No Abdominal Bruit] : a ~M bruit was not heard ~T in the abdomen [No Varicosities] : no varicosities [Pedal Pulses Present] : the pedal pulses are present [No Edema] : there was no peripheral edema [No Palpable Aorta] : no palpable aorta [No Extremity Clubbing/Cyanosis] : no extremity clubbing/cyanosis [Soft] : abdomen soft [Non Tender] : non-tender [Non-distended] : non-distended [No Masses] : no abdominal mass palpated [No HSM] : no HSM [Normal Bowel Sounds] : normal bowel sounds [Normal Posterior Cervical Nodes] : no posterior cervical lymphadenopathy [Normal Anterior Cervical Nodes] : no anterior cervical lymphadenopathy [No CVA Tenderness] : no CVA  tenderness [No Spinal Tenderness] : no spinal tenderness [No Joint Swelling] : no joint swelling [Grossly Normal Strength/Tone] : grossly normal strength/tone [No Rash] : no rash [Coordination Grossly Intact] : coordination grossly intact [No Focal Deficits] : no focal deficits [Normal Gait] : normal gait [Deep Tendon Reflexes (DTR)] : deep tendon reflexes were 2+ and symmetric [Normal Affect] : the affect was normal [Normal Insight/Judgement] : insight and judgment were intact [de-identified] : memory lapses during visit

## 2024-04-17 ENCOUNTER — APPOINTMENT (OUTPATIENT)
Dept: UROGYNECOLOGY | Facility: CLINIC | Age: 74
End: 2024-04-17

## 2024-05-02 ENCOUNTER — APPOINTMENT (OUTPATIENT)
Dept: RHEUMATOLOGY | Facility: CLINIC | Age: 74
End: 2024-05-02
Payer: MEDICARE

## 2024-05-02 VITALS
RESPIRATION RATE: 16 BRPM | DIASTOLIC BLOOD PRESSURE: 63 MMHG | HEIGHT: 63 IN | WEIGHT: 117 LBS | OXYGEN SATURATION: 98 % | BODY MASS INDEX: 20.73 KG/M2 | HEART RATE: 85 BPM | SYSTOLIC BLOOD PRESSURE: 103 MMHG | TEMPERATURE: 98.1 F

## 2024-05-02 DIAGNOSIS — M81.0 AGE-RELATED OSTEOPOROSIS W/OUT CURRENT PATHOLOGICAL FRACTURE: ICD-10-CM

## 2024-05-02 DIAGNOSIS — M62.838 OTHER MUSCLE SPASM: ICD-10-CM

## 2024-05-02 PROCEDURE — 99214 OFFICE O/P EST MOD 30 MIN: CPT

## 2024-05-02 PROCEDURE — G2211 COMPLEX E/M VISIT ADD ON: CPT

## 2024-05-05 NOTE — PHYSICAL EXAM
[General Appearance - Alert] : alert [General Appearance - In No Acute Distress] : in no acute distress [FreeTextEntry1] : mild paraspinal TTP  [Nail Clubbing] : no clubbing  or cyanosis of the fingernails [Motor Tone] : muscle strength and tone were normal

## 2024-05-05 NOTE — HISTORY OF PRESENT ILLNESS
[FreeTextEntry1] : recently started infusion for AD w/ neuro MD  does not remember being here 2 months ago   not taking Rx for OP now due to concern about SE pt w/ OP = recent DEXA FEB 2023 and received prolia August 2023 after that visit she was dx with tooth infection and then ONJ  thought possible 2/2 use of prolia  today we review this and that she will likely need different Rx in the future  but c/o mild intermittent back pain d/w her suggestion to go to PT for both back pain and OP

## 2024-05-05 NOTE — ASSESSMENT
[FreeTextEntry1] : ecently started infusion for AD w/ neuro MD  does not remember being here 2 months ago not taking Rx for OP now due to concern about SE pt w/ OP = recent DEXA FEB 2023 and received prolia August 2023 after that visit she was dx with tooth infection and then ONJ  thought possible 2/2 use of prolia  today we review this and that she will likely need different Rx in the future  but c/o mild intermittent back pain d/w her suggestion to go to PT for both back pain and OP  fuv PRN patient is aware of and in agreement with assessment and plans

## 2024-05-28 ENCOUNTER — APPOINTMENT (OUTPATIENT)
Dept: OTOLARYNGOLOGY | Facility: CLINIC | Age: 74
End: 2024-05-28

## 2024-06-04 ENCOUNTER — APPOINTMENT (OUTPATIENT)
Dept: INTERNAL MEDICINE | Facility: CLINIC | Age: 74
End: 2024-06-04
Payer: MEDICARE

## 2024-06-04 VITALS
HEART RATE: 76 BPM | HEIGHT: 64 IN | DIASTOLIC BLOOD PRESSURE: 64 MMHG | TEMPERATURE: 98.1 F | WEIGHT: 119 LBS | OXYGEN SATURATION: 98 % | SYSTOLIC BLOOD PRESSURE: 96 MMHG | BODY MASS INDEX: 20.32 KG/M2

## 2024-06-04 DIAGNOSIS — F41.9 ANXIETY DISORDER, UNSPECIFIED: ICD-10-CM

## 2024-06-04 DIAGNOSIS — Z74.09 OTHER REDUCED MOBILITY: ICD-10-CM

## 2024-06-04 DIAGNOSIS — N64.9 DISORDER OF BREAST, UNSPECIFIED: ICD-10-CM

## 2024-06-04 DIAGNOSIS — H93.19 TINNITUS, UNSPECIFIED EAR: ICD-10-CM

## 2024-06-04 DIAGNOSIS — F02.A0 OTHER FRONTOTEMPORAL DEMENTIA: ICD-10-CM

## 2024-06-04 DIAGNOSIS — F03.90 UNSPECIFIED DEMENTIA W/OUT BEHAVIORAL DISTURBANCE: ICD-10-CM

## 2024-06-04 DIAGNOSIS — G31.09 OTHER FRONTOTEMPORAL DEMENTIA: ICD-10-CM

## 2024-06-04 PROCEDURE — G2211 COMPLEX E/M VISIT ADD ON: CPT

## 2024-06-04 PROCEDURE — 99214 OFFICE O/P EST MOD 30 MIN: CPT

## 2024-06-04 NOTE — ASSESSMENT
[FreeTextEntry1] : Pt w progressive memory loss. We discussed healthy diet and resuming exercise. Importance of  continuing Leqembi discussed, will ask whether should change to Arisept from Nemantine. Rheum f/u for treatmt of OP, already had ONJ.  Can try PT when gets to [facility] for the summer. Optho ENT Urol- freq urination Try allergy meds eg zyrtec for eyes watery in meantime. Zquil ok for sleep. Green tea healthy.  f/u 3 mos

## 2024-06-04 NOTE — HISTORY OF PRESENT ILLNESS
[FreeTextEntry1] : F/U  Pt late, accomodated [de-identified] : Pt started Ramona w Dr. Epstein, every 2 wks, 1 hr infusion, has had 3 treatments so far. No side effects, no change in memory loss. Is on Nemantine, asking if she should change to Arisept- Saw Rheum, not on anything for her OP; having more mobility issues, eg getting up out of chair a bit harder, she relates to her OP. Not exercising. c/o eyes watery and nose running at times. c/o tinnitus c/o insomnia, Zquil helps, had tried clonopin .25 without relief. Derm- botox etc  c/o droopy breasts (implants yrs ago) Family not always supportive- daughter is, son can be harsh, Sy can lose it with her too.

## 2024-07-18 ENCOUNTER — APPOINTMENT (OUTPATIENT)
Dept: RHEUMATOLOGY | Facility: CLINIC | Age: 74
End: 2024-07-18
Payer: MEDICARE

## 2024-07-18 VITALS
OXYGEN SATURATION: 98 % | HEART RATE: 94 BPM | WEIGHT: 127 LBS | HEIGHT: 64 IN | DIASTOLIC BLOOD PRESSURE: 73 MMHG | BODY MASS INDEX: 21.68 KG/M2 | SYSTOLIC BLOOD PRESSURE: 119 MMHG

## 2024-07-18 DIAGNOSIS — R52 PAIN, UNSPECIFIED: ICD-10-CM

## 2024-07-18 DIAGNOSIS — F03.90 UNSPECIFIED DEMENTIA W/OUT BEHAVIORAL DISTURBANCE: ICD-10-CM

## 2024-07-18 DIAGNOSIS — G89.29 DORSALGIA, UNSPECIFIED: ICD-10-CM

## 2024-07-18 DIAGNOSIS — M54.9 DORSALGIA, UNSPECIFIED: ICD-10-CM

## 2024-07-18 PROCEDURE — 99214 OFFICE O/P EST MOD 30 MIN: CPT

## 2024-09-05 ENCOUNTER — APPOINTMENT (OUTPATIENT)
Dept: RHEUMATOLOGY | Facility: CLINIC | Age: 74
End: 2024-09-05

## 2024-09-05 ENCOUNTER — APPOINTMENT (OUTPATIENT)
Dept: INTERNAL MEDICINE | Facility: CLINIC | Age: 74
End: 2024-09-05
Payer: MEDICARE

## 2024-09-05 VITALS
BODY MASS INDEX: 20.38 KG/M2 | OXYGEN SATURATION: 98 % | SYSTOLIC BLOOD PRESSURE: 145 MMHG | DIASTOLIC BLOOD PRESSURE: 75 MMHG | HEART RATE: 130 BPM | HEIGHT: 64 IN | TEMPERATURE: 97.2 F | WEIGHT: 119.38 LBS

## 2024-09-05 VITALS — DIASTOLIC BLOOD PRESSURE: 64 MMHG | HEART RATE: 76 BPM | SYSTOLIC BLOOD PRESSURE: 95 MMHG

## 2024-09-05 DIAGNOSIS — H93.19 TINNITUS, UNSPECIFIED EAR: ICD-10-CM

## 2024-09-05 DIAGNOSIS — F41.9 ANXIETY DISORDER, UNSPECIFIED: ICD-10-CM

## 2024-09-05 DIAGNOSIS — R35.89 OTHER POLYURIA: ICD-10-CM

## 2024-09-05 DIAGNOSIS — F03.90 UNSPECIFIED DEMENTIA W/OUT BEHAVIORAL DISTURBANCE: ICD-10-CM

## 2024-09-05 DIAGNOSIS — R41.89 OTHER SYMPTOMS AND SIGNS INVOLVING COGNITIVE FUNCTIONS AND AWARENESS: ICD-10-CM

## 2024-09-05 PROCEDURE — 99214 OFFICE O/P EST MOD 30 MIN: CPT

## 2024-09-05 PROCEDURE — G2211 COMPLEX E/M VISIT ADD ON: CPT

## 2024-09-06 RX ORDER — TRAZODONE HYDROCHLORIDE 50 MG/1
50 TABLET ORAL
Qty: 90 | Refills: 0 | Status: ACTIVE | COMMUNITY
Start: 2024-09-06 | End: 1900-01-01

## 2024-09-22 ENCOUNTER — NON-APPOINTMENT (OUTPATIENT)
Age: 74
End: 2024-09-22

## 2024-09-23 ENCOUNTER — APPOINTMENT (OUTPATIENT)
Dept: NEUROLOGY | Facility: CLINIC | Age: 74
End: 2024-09-23
Payer: MEDICARE

## 2024-09-23 ENCOUNTER — APPOINTMENT (OUTPATIENT)
Dept: NEUROLOGY | Facility: CLINIC | Age: 74
End: 2024-09-23

## 2024-09-23 VITALS
HEIGHT: 64 IN | DIASTOLIC BLOOD PRESSURE: 67 MMHG | SYSTOLIC BLOOD PRESSURE: 119 MMHG | WEIGHT: 119 LBS | BODY MASS INDEX: 20.32 KG/M2 | HEART RATE: 72 BPM

## 2024-09-23 DIAGNOSIS — F41.9 ANXIETY DISORDER, UNSPECIFIED: ICD-10-CM

## 2024-09-23 DIAGNOSIS — R41.89 OTHER SYMPTOMS AND SIGNS INVOLVING COGNITIVE FUNCTIONS AND AWARENESS: ICD-10-CM

## 2024-09-23 DIAGNOSIS — H93.19 TINNITUS, UNSPECIFIED EAR: ICD-10-CM

## 2024-09-23 DIAGNOSIS — R41.3 OTHER AMNESIA: ICD-10-CM

## 2024-09-23 DIAGNOSIS — G47.00 INSOMNIA, UNSPECIFIED: ICD-10-CM

## 2024-09-23 DIAGNOSIS — G43.909 MIGRAINE, UNSPECIFIED, NOT INTRACTABLE, W/OUT STATUS MIGRAINOSUS: ICD-10-CM

## 2024-09-23 PROCEDURE — G2211 COMPLEX E/M VISIT ADD ON: CPT

## 2024-09-23 PROCEDURE — 99214 OFFICE O/P EST MOD 30 MIN: CPT

## 2024-09-23 RX ORDER — RIVASTIGMINE TARTRATE 1.5 MG/1
1.5 CAPSULE ORAL
Qty: 30 | Refills: 3 | Status: ACTIVE | COMMUNITY
Start: 2024-09-23 | End: 1900-01-01

## 2024-09-23 NOTE — HISTORY OF PRESENT ILLNESS
[FreeTextEntry1] : NO COVID. COVID VACCINE FULL.  HPI interval hx [id number]: 74-year-old female presents today for follow up. She lives with her boyfriend of 23 years but she only lives with him on weekends during colder months. She is no longer a . 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 Her adls and iadls take her longer. SHe can do everything herself but she is forgetful. SHe is sad due to memory loss issues but denies depression. No hallucinations or delusions. She is less conversational. She is more apathetic. No falls or recent hospitalizations. SHe recognizes faces but does not know the names.   HPI: 73yo RH WW with HTN (not on meds anymore), here for concerns of memory loss.  Spine Fx from a fall off the bed  or so. ? due to osteoporosis? No Sx.  PMH: pt reporting her own issues. a few years ago, she started to have STM issues, forgetting names, tasks etc. In the last 3 months, she had difficulties with faces, recognizing people.  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   -Memory: recent events, names -Speech: may have some issues producing sentences, some WFD, seems to be improved now by spending more time with people -Orientation: usually ok, not getting lost, but at times hesitates -Praxis: ok -Decision making/Executive fx/Multitasking: ok, set schedule  -Sleep: some insomnia, ok with Trazodone  -Appetite: ok, stable, very controlled diet; poor BF and lunch, but eats a bit better at dinner  -Motor symptoms: ok  -B/B: urinary frequency, more so at night  -Psychiatric symptoms: some anxiety, some worries on her health (used to take GBP, Prednisone, Amitriptyline, mirtazapine in the past)  -Functional status: Cole Index of [place] in Activities of Daily Livin. Bathing/Showerin 2. Dressin 3. Toiletin 4. Transferrin 5. Continence: 1 6: Feedin  TOTAL: 6  [place]-Manuel Instrumental Activities of Daily Living: A. Ability to Use Telephone: 1 B. Shoppin C. Food Preparation: 1 D. Housekeepin E. Laundry: 1 F. Transportation: 1 G. Responsibility for Own Medications: 1 H: Ability to Handle Finances: 1  TOTAL: 8  CDR: 0.5  -Professional status:   PCP and other physicians: -PCP: SUSAN CRUZ -Neuro: Tete  Workup done: -CTh  -MRI BRAIN  ([facility] - no report) -NPT (Haimovic - ???) -FDG-PET  ([facility] - LBD?).

## 2024-09-23 NOTE — HISTORY OF PRESENT ILLNESS
[FreeTextEntry1] : NO COVID. COVID VACCINE FULL.  HPI interval hx [id number]: 74-year-old female presents today for follow up. She lives with her boyfriend of 23 years but she only lives with him on weekends during colder months. She is no longer a . 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 Her adls and iadls take her longer. SHe can do everything herself but she is forgetful. SHe is sad due to memory loss issues but denies depression. No hallucinations or delusions. She is less conversational. She is more apathetic. No falls or recent hospitalizations. SHe recognizes faces but does not know the names.   HPI: 73yo RH WW with HTN (not on meds anymore), here for concerns of memory loss.  Spine Fx from a fall off the bed  or so. ? due to osteoporosis? No Sx.  PMH: pt reporting her own issues. a few years ago, she started to have STM issues, forgetting names, tasks etc. In the last 3 months, she had difficulties with faces, recognizing people.  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   -Memory: recent events, names -Speech: may have some issues producing sentences, some WFD, seems to be improved now by spending more time with people -Orientation: usually ok, not getting lost, but at times hesitates -Praxis: ok -Decision making/Executive fx/Multitasking: ok, set schedule  -Sleep: some insomnia, ok with Trazodone  -Appetite: ok, stable, very controlled diet; poor BF and lunch, but eats a bit better at dinner  -Motor symptoms: ok  -B/B: urinary frequency, more so at night  -Psychiatric symptoms: some anxiety, some worries on her health (used to take GBP, Prednisone, Amitriptyline, mirtazapine in the past)  -Functional status: Cole Index of [place] in Activities of Daily Livin. Bathing/Showerin 2. Dressin 3. Toiletin 4. Transferrin 5. Continence: 1 6: Feedin  TOTAL: 6  [place]-Manuel Instrumental Activities of Daily Living: A. Ability to Use Telephone: 1 B. Shoppin C. Food Preparation: 1 D. Housekeepin E. Laundry: 1 F. Transportation: 1 G. Responsibility for Own Medications: 1 H: Ability to Handle Finances: 1  TOTAL: 8  CDR: 0.5  -Professional status:   PCP and other physicians: -PCP: SUSAN CRUZ -Neuro: Tete  Workup done: -CTh  -MRI BRAIN  ([facility] - no report) -NPT (Haimovic - ???) -FDG-PET  ([facility] - LBD?).

## 2024-09-23 NOTE — ASSESSMENT
[FreeTextEntry1] : Assessment: 75 yo RH WW with ongoing STM issues, mostly difficulty with names, recent events and conversations.  MS exam is otherwise ok. Motor exam ok. Not impressed with the testing, and ? evidence of neurodegenerative disease, from prior reported imaging, including FDG-PET (? LBD) and AVTAR (negative). On CTh there is a bit of FT atrophy, not striking per age. Slow processing and getting flustered.  Positive amyloid pet scan [facility] 27/30 (same as previous)   Diagnostic Impression: -forgetfulness NOS -cognitive changes  Plan: -MRI 3D dementia for lecanemab -APOE -Educated on importance of healthy lifestyle modifications such as daily exercise, healthy balanced diet and good sleep habits  -Started rivastigmine 1.5 mg daily as per Dr. Chung's last note

## 2024-09-23 NOTE — PHYSICAL EXAM
[General Appearance - Alert] : alert [General Appearance - In No Acute Distress] : in no acute distress [Oriented To Time, Place, And Person] : oriented to person, place, and time [Impaired Insight] : insight and judgment were intact [Affect] : the affect was normal [Person] : oriented to person [Place] : oriented to place [Time] : oriented to time [Remote Intact] : remote memory intact [Registration Intact] : recent registration memory intact [Concentration Intact] : normal concentrating ability [Visual Intact] : visual attention was ~T not ~L decreased [Naming Objects] : no difficulty naming common objects [Repeating Phrases] : no difficulty repeating a phrase [Writing A Sentence] : no difficulty writing a sentence [Fluency] : fluency intact [Comprehension] : comprehension intact [Reading] : reading intact [Current Events] : adequate knowledge of current events [Past History] : adequate knowledge of personal past history [Vocabulary] : adequate range of vocabulary [Total Score ___ / 30] : the patient achieved a score of [unfilled] /30 [Date / Time ___ / 5] : date / time [unfilled] / 5 [Place ___ / 5] : place [unfilled] / 5 [Registration ___ / 3] : registration [unfilled] / 3 [Serial Sevens ___/5] : serial sevens [unfilled] / 5 [Naming 2 Objects ___ / 2] : naming two objects [unfilled] / 2 [Repeating a Sentence ___ / 1] : repeating a sentence [unfilled] / 1 [Writing a Sentence ___ / 1] : write sentence [unfilled] / 1 [3-stage Verbal Command ___ / 3] : three-stage verbal command [unfilled] / 3 [Written Command ___ / 1] : written command [unfilled] / 1 [Copy a Design ___ / 1] : copy a design [unfilled] / 1 [Recall ___ / 3] : recall [unfilled] / 3 [Cranial Nerves Optic (II)] : visual acuity intact bilaterally,  visual fields full to confrontation, pupils equal round and reactive to light [Cranial Nerves Oculomotor (III)] : extraocular motion intact [Cranial Nerves Trigeminal (V)] : facial sensation intact symmetrically [Cranial Nerves Facial (VII)] : face symmetrical [Cranial Nerves Vestibulocochlear (VIII)] : hearing was intact bilaterally [Cranial Nerves Glossopharyngeal (IX)] : tongue and palate midline [Cranial Nerves Accessory (XI - Cranial And Spinal)] : head turning and shoulder shrug symmetric [Cranial Nerves Hypoglossal (XII)] : there was no tongue deviation with protrusion [Motor Tone] : muscle tone was normal in all four extremities [Motor Strength] : muscle strength was normal in all four extremities [Involuntary Movements] : no involuntary movements were seen [No Muscle Atrophy] : normal bulk in all four extremities [Motor Handedness Right-Handed] : the patient is right hand dominant [Sensation Tactile Decrease] : light touch was intact [Balance] : balance was intact [2+] : Brachioradialis left 2+ [1+] : Ankle jerk left 1+ [Sclera] : the sclera and conjunctiva were normal [PERRL With Normal Accommodation] : pupils were equal in size, round, reactive to light, with normal accommodation [Extraocular Movements] : extraocular movements were intact [No APD] : no afferent pupillary defect [No LAMBERTO] : no internuclear ophthalmoplegia [Full Visual Field] : full visual field [Outer Ear] : the ears and nose were normal in appearance [Neck Appearance] : the appearance of the neck was normal [Edema] : there was no peripheral edema [Abnormal Walk] : normal gait [] : no rash [Short Term Intact] : short term memory impaired [Motor Strength Upper Extremities Bilaterally] : strength was normal in both upper extremities [Motor Strength Lower Extremities Bilaterally] : strength was normal in both lower extremities [Romberg's Sign] : Romberg's sign was negtive [Past-pointing] : there was no past-pointing [Tremor] : no tremor present [Plantar Reflex Right Only] : normal on the right [Plantar Reflex Left Only] : normal on the left [FreeTextEntry4] : Mental Status Exam Presidents: 2/5 Alternating Pattern: ok Spiral: ok Clock: 3/3 Repetition: ok  Trail A: B: Fluency: A: 13; Animals: 4  Go-No-Go: ok R/L discrimination on self and examiner: ok Cross-line commands: ok Praxis: ok -Motor: ok -Dynamic/Luria: difficulty switching hands -Ideomotor/Imitation: ok -Ideational/writing/closing-in: ok -Dressing: ok. [FreeTextEntry9] : Normal gait, march, pull and pivot. ? reduced swing LUE.

## 2024-10-08 ENCOUNTER — APPOINTMENT (OUTPATIENT)
Dept: MRI IMAGING | Facility: CLINIC | Age: 74
End: 2024-10-08
Payer: MEDICARE

## 2024-10-08 ENCOUNTER — OUTPATIENT (OUTPATIENT)
Dept: OUTPATIENT SERVICES | Facility: HOSPITAL | Age: 74
LOS: 1 days | End: 2024-10-08
Payer: MEDICARE

## 2024-10-08 DIAGNOSIS — R41.89 OTHER SYMPTOMS AND SIGNS INVOLVING COGNITIVE FUNCTIONS AND AWARENESS: ICD-10-CM

## 2024-10-08 PROCEDURE — 76377 3D RENDER W/INTRP POSTPROCES: CPT | Mod: 26

## 2024-10-08 PROCEDURE — 70553 MRI BRAIN STEM W/O & W/DYE: CPT | Mod: 26,MH

## 2024-10-10 ENCOUNTER — APPOINTMENT (OUTPATIENT)
Dept: RHEUMATOLOGY | Facility: CLINIC | Age: 74
End: 2024-10-10

## 2024-10-14 ENCOUNTER — RX RENEWAL (OUTPATIENT)
Age: 74
End: 2024-10-14

## 2024-10-15 ENCOUNTER — APPOINTMENT (OUTPATIENT)
Dept: NEUROLOGY | Facility: CLINIC | Age: 74
End: 2024-10-15

## 2024-10-21 LAB
ALBUMIN SERPL ELPH-MCNC: 4.5 G/DL
ALP BLD-CCNC: 86 U/L
ALT SERPL-CCNC: 13 U/L
ANION GAP SERPL CALC-SCNC: 16 MMOL/L
AST SERPL-CCNC: 24 U/L
BILIRUB SERPL-MCNC: 1.1 MG/DL
BUN SERPL-MCNC: 11 MG/DL
CALCIUM SERPL-MCNC: 9.8 MG/DL
CHLORIDE SERPL-SCNC: 101 MMOL/L
CO2 SERPL-SCNC: 28 MMOL/L
CREAT SERPL-MCNC: 0.91 MG/DL
EGFR: 67 ML/MIN/1.73M2
ESTIMATED AVERAGE GLUCOSE: 114 MG/DL
GLUCOSE SERPL-MCNC: 81 MG/DL
HBA1C MFR BLD HPLC: 5.6 %
INR PPP: 0.98 RATIO
POTASSIUM SERPL-SCNC: 4 MMOL/L
PROT SERPL-MCNC: 6.7 G/DL
PT BLD: 11.6 SEC
SODIUM SERPL-SCNC: 145 MMOL/L

## 2024-10-23 DIAGNOSIS — G31.84 MILD COGNITIVE IMPAIRMENT, SO STATED: ICD-10-CM

## 2024-10-28 RX ORDER — RIVASTIGMINE TARTRATE 1.5 MG/1
1.5 CAPSULE ORAL
Qty: 30 | Refills: 3 | Status: ACTIVE | COMMUNITY
Start: 2024-10-28 | End: 1900-01-01

## 2024-10-30 ENCOUNTER — NON-APPOINTMENT (OUTPATIENT)
Age: 74
End: 2024-10-30

## 2024-11-04 ENCOUNTER — APPOINTMENT (OUTPATIENT)
Dept: NEUROLOGY | Facility: CLINIC | Age: 74
End: 2024-11-04
Payer: MEDICARE

## 2024-11-04 VITALS
BODY MASS INDEX: 20.32 KG/M2 | SYSTOLIC BLOOD PRESSURE: 122 MMHG | HEART RATE: 69 BPM | WEIGHT: 119 LBS | HEIGHT: 64 IN | DIASTOLIC BLOOD PRESSURE: 83 MMHG

## 2024-11-04 DIAGNOSIS — G47.00 INSOMNIA, UNSPECIFIED: ICD-10-CM

## 2024-11-04 DIAGNOSIS — F41.9 ANXIETY DISORDER, UNSPECIFIED: ICD-10-CM

## 2024-11-04 DIAGNOSIS — R42 DIZZINESS AND GIDDINESS: ICD-10-CM

## 2024-11-04 DIAGNOSIS — F32.A DEPRESSION, UNSPECIFIED: ICD-10-CM

## 2024-11-04 DIAGNOSIS — G31.84 MILD COGNITIVE IMPAIRMENT, SO STATED: ICD-10-CM

## 2024-11-04 PROCEDURE — 99214 OFFICE O/P EST MOD 30 MIN: CPT

## 2024-11-04 PROCEDURE — G2211 COMPLEX E/M VISIT ADD ON: CPT

## 2024-11-04 RX ORDER — RIVASTIGMINE TARTRATE 1.5 MG/1
1.5 CAPSULE ORAL
Qty: 30 | Refills: 3 | Status: ACTIVE | COMMUNITY
Start: 2024-11-04 | End: 1900-01-01

## 2024-11-05 ENCOUNTER — APPOINTMENT (OUTPATIENT)
Dept: NEUROLOGY | Facility: CLINIC | Age: 74
End: 2024-11-05

## 2024-11-07 ENCOUNTER — NON-APPOINTMENT (OUTPATIENT)
Age: 74
End: 2024-11-07

## 2024-11-08 ENCOUNTER — APPOINTMENT (OUTPATIENT)
Dept: RHEUMATOLOGY | Facility: CLINIC | Age: 74
End: 2024-11-08
Payer: MEDICARE

## 2024-11-08 VITALS
DIASTOLIC BLOOD PRESSURE: 69 MMHG | HEIGHT: 64 IN | SYSTOLIC BLOOD PRESSURE: 104 MMHG | HEART RATE: 78 BPM | BODY MASS INDEX: 20.32 KG/M2 | OXYGEN SATURATION: 98 % | WEIGHT: 119 LBS

## 2024-11-08 DIAGNOSIS — M62.81 MUSCLE WEAKNESS (GENERALIZED): ICD-10-CM

## 2024-11-08 DIAGNOSIS — F03.90 UNSPECIFIED DEMENTIA W/OUT BEHAVIORAL DISTURBANCE: ICD-10-CM

## 2024-11-08 PROCEDURE — 99215 OFFICE O/P EST HI 40 MIN: CPT

## 2024-11-08 PROCEDURE — G2211 COMPLEX E/M VISIT ADD ON: CPT

## 2024-11-11 DIAGNOSIS — F02.80 ALZHEIMER'S DISEASE, UNSPECIFIED: ICD-10-CM

## 2024-11-11 DIAGNOSIS — G30.9 ALZHEIMER'S DISEASE, UNSPECIFIED: ICD-10-CM

## 2024-11-11 RX ORDER — LECANEMAB 100 MG/ML
200 INJECTION, SOLUTION INTRAVENOUS ONCE
Qty: 3 | Refills: 3 | Status: ACTIVE | COMMUNITY
Start: 2024-11-11 | End: 1900-01-01

## 2024-11-14 ENCOUNTER — RX RENEWAL (OUTPATIENT)
Age: 74
End: 2024-11-14

## 2024-11-20 PROCEDURE — A9585: CPT

## 2024-11-20 PROCEDURE — 70553 MRI BRAIN STEM W/O & W/DYE: CPT

## 2024-11-20 PROCEDURE — 76377 3D RENDER W/INTRP POSTPROCES: CPT

## 2024-11-26 ENCOUNTER — OUTPATIENT (OUTPATIENT)
Dept: OUTPATIENT SERVICES | Facility: HOSPITAL | Age: 74
LOS: 1 days | End: 2024-11-26
Payer: MEDICARE

## 2024-11-26 ENCOUNTER — APPOINTMENT (OUTPATIENT)
Dept: NEUROLOGY | Facility: CLINIC | Age: 74
End: 2024-11-26

## 2024-11-26 ENCOUNTER — APPOINTMENT (OUTPATIENT)
Dept: MRI IMAGING | Facility: CLINIC | Age: 74
End: 2024-11-26
Payer: MEDICARE

## 2024-11-26 DIAGNOSIS — M62.81 MUSCLE WEAKNESS (GENERALIZED): ICD-10-CM

## 2024-11-26 PROCEDURE — 96365 THER/PROPH/DIAG IV INF INIT: CPT

## 2024-11-26 PROCEDURE — 73718 MRI LOWER EXTREMITY W/O DYE: CPT | Mod: 26,50,MH

## 2024-11-26 PROCEDURE — 73718 MRI LOWER EXTREMITY W/O DYE: CPT

## 2024-12-09 ENCOUNTER — APPOINTMENT (OUTPATIENT)
Dept: INTERNAL MEDICINE | Facility: CLINIC | Age: 74
End: 2024-12-09

## 2024-12-10 ENCOUNTER — NON-APPOINTMENT (OUTPATIENT)
Age: 74
End: 2024-12-10

## 2024-12-10 ENCOUNTER — APPOINTMENT (OUTPATIENT)
Dept: NEUROLOGY | Facility: CLINIC | Age: 74
End: 2024-12-10

## 2024-12-10 PROCEDURE — 96365 THER/PROPH/DIAG IV INF INIT: CPT

## 2024-12-19 ENCOUNTER — APPOINTMENT (OUTPATIENT)
Dept: RHEUMATOLOGY | Facility: CLINIC | Age: 74
End: 2024-12-19
Payer: MEDICARE

## 2024-12-19 VITALS
DIASTOLIC BLOOD PRESSURE: 56 MMHG | BODY MASS INDEX: 20.32 KG/M2 | HEART RATE: 78 BPM | WEIGHT: 119 LBS | HEIGHT: 64 IN | OXYGEN SATURATION: 98 % | SYSTOLIC BLOOD PRESSURE: 88 MMHG

## 2024-12-19 DIAGNOSIS — M62.81 MUSCLE WEAKNESS (GENERALIZED): ICD-10-CM

## 2024-12-19 DIAGNOSIS — M81.0 AGE-RELATED OSTEOPOROSIS W/OUT CURRENT PATHOLOGICAL FRACTURE: ICD-10-CM

## 2024-12-19 DIAGNOSIS — M62.50 MUSCLE WASTING AND ATROPHY, NOT ELSEWHERE CLASSIFIED, UNSPECIFIED SITE: ICD-10-CM

## 2024-12-19 DIAGNOSIS — M70.60 TROCHANTERIC BURSITIS, UNSPECIFIED HIP: ICD-10-CM

## 2024-12-19 PROCEDURE — G2211 COMPLEX E/M VISIT ADD ON: CPT

## 2024-12-19 PROCEDURE — 99214 OFFICE O/P EST MOD 30 MIN: CPT

## 2024-12-24 ENCOUNTER — APPOINTMENT (OUTPATIENT)
Dept: NEUROLOGY | Facility: CLINIC | Age: 74
End: 2024-12-24

## 2024-12-24 PROCEDURE — 96365 THER/PROPH/DIAG IV INF INIT: CPT

## 2025-01-07 ENCOUNTER — APPOINTMENT (OUTPATIENT)
Dept: NEUROLOGY | Facility: CLINIC | Age: 75
End: 2025-01-07

## 2025-01-07 PROCEDURE — 96365 THER/PROPH/DIAG IV INF INIT: CPT

## 2025-01-09 ENCOUNTER — NON-APPOINTMENT (OUTPATIENT)
Age: 75
End: 2025-01-09

## 2025-01-13 ENCOUNTER — NON-APPOINTMENT (OUTPATIENT)
Age: 75
End: 2025-01-13

## 2025-01-13 ENCOUNTER — APPOINTMENT (OUTPATIENT)
Dept: NEUROLOGY | Facility: CLINIC | Age: 75
End: 2025-01-13
Payer: MEDICARE

## 2025-01-13 VITALS
BODY MASS INDEX: 20.32 KG/M2 | SYSTOLIC BLOOD PRESSURE: 120 MMHG | HEIGHT: 64 IN | DIASTOLIC BLOOD PRESSURE: 77 MMHG | WEIGHT: 119 LBS | HEART RATE: 98 BPM

## 2025-01-13 DIAGNOSIS — G30.9 ALZHEIMER'S DISEASE, UNSPECIFIED: ICD-10-CM

## 2025-01-13 DIAGNOSIS — F41.9 ANXIETY DISORDER, UNSPECIFIED: ICD-10-CM

## 2025-01-13 DIAGNOSIS — R41.89 OTHER SYMPTOMS AND SIGNS INVOLVING COGNITIVE FUNCTIONS AND AWARENESS: ICD-10-CM

## 2025-01-13 DIAGNOSIS — F02.80 ALZHEIMER'S DISEASE, UNSPECIFIED: ICD-10-CM

## 2025-01-13 PROCEDURE — 99215 OFFICE O/P EST HI 40 MIN: CPT

## 2025-01-13 PROCEDURE — G2211 COMPLEX E/M VISIT ADD ON: CPT

## 2025-01-13 PROCEDURE — 99205 OFFICE O/P NEW HI 60 MIN: CPT

## 2025-01-21 ENCOUNTER — APPOINTMENT (OUTPATIENT)
Dept: NEUROLOGY | Facility: CLINIC | Age: 75
End: 2025-01-21

## 2025-01-28 ENCOUNTER — APPOINTMENT (OUTPATIENT)
Dept: INTERNAL MEDICINE | Facility: CLINIC | Age: 75
End: 2025-01-28
Payer: MEDICARE

## 2025-01-28 VITALS
HEART RATE: 69 BPM | DIASTOLIC BLOOD PRESSURE: 75 MMHG | SYSTOLIC BLOOD PRESSURE: 118 MMHG | WEIGHT: 120 LBS | OXYGEN SATURATION: 98 % | BODY MASS INDEX: 20.49 KG/M2 | TEMPERATURE: 97.5 F | HEIGHT: 64 IN

## 2025-01-28 DIAGNOSIS — Z00.00 ENCOUNTER FOR GENERAL ADULT MEDICAL EXAMINATION W/OUT ABNORMAL FINDINGS: ICD-10-CM

## 2025-01-28 DIAGNOSIS — G31.84 MILD COGNITIVE IMPAIRMENT, SO STATED: ICD-10-CM

## 2025-01-28 DIAGNOSIS — R92.30 DENSE BREASTS, UNSPECIFIED: ICD-10-CM

## 2025-01-28 DIAGNOSIS — M54.42 LUMBAGO WITH SCIATICA, LEFT SIDE: ICD-10-CM

## 2025-01-28 DIAGNOSIS — R52 PAIN, UNSPECIFIED: ICD-10-CM

## 2025-01-28 PROCEDURE — 99214 OFFICE O/P EST MOD 30 MIN: CPT

## 2025-01-28 PROCEDURE — G2211 COMPLEX E/M VISIT ADD ON: CPT

## 2025-01-29 ENCOUNTER — NON-APPOINTMENT (OUTPATIENT)
Age: 75
End: 2025-01-29

## 2025-02-03 ENCOUNTER — APPOINTMENT (OUTPATIENT)
Dept: RHEUMATOLOGY | Facility: CLINIC | Age: 75
End: 2025-02-03
Payer: MEDICARE

## 2025-02-03 ENCOUNTER — APPOINTMENT (OUTPATIENT)
Dept: ENDOCRINOLOGY | Facility: CLINIC | Age: 75
End: 2025-02-03

## 2025-02-03 VITALS
DIASTOLIC BLOOD PRESSURE: 73 MMHG | HEIGHT: 64 IN | HEART RATE: 60 BPM | OXYGEN SATURATION: 98 % | RESPIRATION RATE: 16 BRPM | SYSTOLIC BLOOD PRESSURE: 113 MMHG | WEIGHT: 120.38 LBS | BODY MASS INDEX: 20.55 KG/M2

## 2025-02-03 VITALS — HEIGHT: 61 IN | BODY MASS INDEX: 22.28 KG/M2 | WEIGHT: 118 LBS

## 2025-02-03 DIAGNOSIS — M81.0 AGE-RELATED OSTEOPOROSIS W/OUT CURRENT PATHOLOGICAL FRACTURE: ICD-10-CM

## 2025-02-03 DIAGNOSIS — M62.50 MUSCLE WASTING AND ATROPHY, NOT ELSEWHERE CLASSIFIED, UNSPECIFIED SITE: ICD-10-CM

## 2025-02-03 DIAGNOSIS — M54.40 LUMBAGO WITH SCIATICA, UNSPECIFIED SIDE: ICD-10-CM

## 2025-02-03 DIAGNOSIS — F02.80 ALZHEIMER'S DISEASE, UNSPECIFIED: ICD-10-CM

## 2025-02-03 DIAGNOSIS — M62.81 MUSCLE WEAKNESS (GENERALIZED): ICD-10-CM

## 2025-02-03 DIAGNOSIS — G30.9 ALZHEIMER'S DISEASE, UNSPECIFIED: ICD-10-CM

## 2025-02-03 PROCEDURE — 99213 OFFICE O/P EST LOW 20 MIN: CPT

## 2025-02-03 PROCEDURE — 77080 DXA BONE DENSITY AXIAL: CPT | Mod: GA

## 2025-02-04 PROBLEM — M54.40 LOW BACK PAIN WITH SCIATICA, SCIATICA LATERALITY UNSPECIFIED, UNSPECIFIED BACK PAIN LATERALITY, UNSPECIFIED CHRONICITY: Status: ACTIVE | Noted: 2025-02-04

## 2025-02-05 ENCOUNTER — APPOINTMENT (OUTPATIENT)
Dept: NEUROLOGY | Facility: CLINIC | Age: 75
End: 2025-02-05

## 2025-02-11 ENCOUNTER — RESULT REVIEW (OUTPATIENT)
Age: 75
End: 2025-02-11

## 2025-02-11 ENCOUNTER — APPOINTMENT (OUTPATIENT)
Dept: MRI IMAGING | Facility: CLINIC | Age: 75
End: 2025-02-11
Payer: MEDICARE

## 2025-02-11 ENCOUNTER — APPOINTMENT (OUTPATIENT)
Dept: ULTRASOUND IMAGING | Facility: CLINIC | Age: 75
End: 2025-02-11
Payer: MEDICARE

## 2025-02-11 ENCOUNTER — APPOINTMENT (OUTPATIENT)
Dept: MAMMOGRAPHY | Facility: CLINIC | Age: 75
End: 2025-02-11
Payer: MEDICARE

## 2025-02-11 ENCOUNTER — OUTPATIENT (OUTPATIENT)
Dept: OUTPATIENT SERVICES | Facility: HOSPITAL | Age: 75
LOS: 1 days | End: 2025-02-11
Payer: MEDICARE

## 2025-02-11 ENCOUNTER — NON-APPOINTMENT (OUTPATIENT)
Age: 75
End: 2025-02-11

## 2025-02-11 DIAGNOSIS — G30.9 ALZHEIMER'S DISEASE, UNSPECIFIED: ICD-10-CM

## 2025-02-11 DIAGNOSIS — Z00.00 ENCOUNTER FOR GENERAL ADULT MEDICAL EXAMINATION WITHOUT ABNORMAL FINDINGS: ICD-10-CM

## 2025-02-11 DIAGNOSIS — R92.30 DENSE BREASTS, UNSPECIFIED: ICD-10-CM

## 2025-02-11 PROCEDURE — 70553 MRI BRAIN STEM W/O & W/DYE: CPT | Mod: 26

## 2025-02-11 PROCEDURE — 76641 ULTRASOUND BREAST COMPLETE: CPT | Mod: 26,50,GA

## 2025-02-11 PROCEDURE — 77066 DX MAMMO INCL CAD BI: CPT | Mod: 26

## 2025-02-11 PROCEDURE — 77066 DX MAMMO INCL CAD BI: CPT

## 2025-02-11 PROCEDURE — 70553 MRI BRAIN STEM W/O & W/DYE: CPT

## 2025-02-11 PROCEDURE — G0279: CPT | Mod: 26

## 2025-02-11 PROCEDURE — 76641 ULTRASOUND BREAST COMPLETE: CPT

## 2025-02-11 PROCEDURE — G0279: CPT

## 2025-02-11 PROCEDURE — A9585: CPT

## 2025-02-27 ENCOUNTER — RX RENEWAL (OUTPATIENT)
Age: 75
End: 2025-02-27

## 2025-03-03 ENCOUNTER — APPOINTMENT (OUTPATIENT)
Dept: RHEUMATOLOGY | Facility: CLINIC | Age: 75
End: 2025-03-03

## 2025-03-20 ENCOUNTER — NON-APPOINTMENT (OUTPATIENT)
Age: 75
End: 2025-03-20

## 2025-04-21 ENCOUNTER — NON-APPOINTMENT (OUTPATIENT)
Age: 75
End: 2025-04-21

## 2025-04-22 ENCOUNTER — NON-APPOINTMENT (OUTPATIENT)
Age: 75
End: 2025-04-22

## 2025-04-23 ENCOUNTER — APPOINTMENT (OUTPATIENT)
Dept: RHEUMATOLOGY | Facility: CLINIC | Age: 75
End: 2025-04-23
Payer: MEDICARE

## 2025-04-23 VITALS
DIASTOLIC BLOOD PRESSURE: 74 MMHG | SYSTOLIC BLOOD PRESSURE: 109 MMHG | OXYGEN SATURATION: 97 % | BODY MASS INDEX: 20.77 KG/M2 | HEART RATE: 60 BPM | WEIGHT: 110 LBS | HEIGHT: 61 IN

## 2025-04-23 DIAGNOSIS — R29.898 OTHER SYMPTOMS AND SIGNS INVOLVING THE MUSCULOSKELETAL SYSTEM: ICD-10-CM

## 2025-04-23 DIAGNOSIS — R41.89 OTHER SYMPTOMS AND SIGNS INVOLVING COGNITIVE FUNCTIONS AND AWARENESS: ICD-10-CM

## 2025-04-23 DIAGNOSIS — M25.50 PAIN IN UNSPECIFIED JOINT: ICD-10-CM

## 2025-04-23 PROCEDURE — G2211 COMPLEX E/M VISIT ADD ON: CPT

## 2025-04-23 PROCEDURE — 99215 OFFICE O/P EST HI 40 MIN: CPT

## 2025-04-25 LAB
25(OH)D3 SERPL-MCNC: 94.5 NG/ML
ALBUMIN SERPL ELPH-MCNC: 4.3 G/DL
ALP BLD-CCNC: 52 U/L
ALT SERPL-CCNC: 12 U/L
ANION GAP SERPL CALC-SCNC: 13 MMOL/L
AST SERPL-CCNC: 24 U/L
BASOPHILS # BLD AUTO: 0.04 K/UL
BASOPHILS NFR BLD AUTO: 0.6 %
BILIRUB SERPL-MCNC: 1.8 MG/DL
BUN SERPL-MCNC: 13 MG/DL
CALCIUM SERPL-MCNC: 10.4 MG/DL
CHLORIDE SERPL-SCNC: 103 MMOL/L
CO2 SERPL-SCNC: 27 MMOL/L
CREAT SERPL-MCNC: 1.14 MG/DL
CRP SERPL-MCNC: <3 MG/L
EGFRCR SERPLBLD CKD-EPI 2021: 51 ML/MIN/1.73M2
EOSINOPHIL # BLD AUTO: 0.11 K/UL
EOSINOPHIL NFR BLD AUTO: 1.8 %
ERYTHROCYTE [SEDIMENTATION RATE] IN BLOOD BY WESTERGREN METHOD: 2 MM/HR
HCT VFR BLD CALC: 40.5 %
HGB BLD-MCNC: 13.5 G/DL
IMM GRANULOCYTES NFR BLD AUTO: 0.2 %
LYMPHOCYTES # BLD AUTO: 1.93 K/UL
LYMPHOCYTES NFR BLD AUTO: 30.8 %
MAN DIFF?: NORMAL
MCHC RBC-ENTMCNC: 28.8 PG
MCHC RBC-ENTMCNC: 33.3 G/DL
MCV RBC AUTO: 86.5 FL
MONOCYTES # BLD AUTO: 0.38 K/UL
MONOCYTES NFR BLD AUTO: 6.1 %
NEUTROPHILS # BLD AUTO: 3.79 K/UL
NEUTROPHILS NFR BLD AUTO: 60.5 %
PLATELET # BLD AUTO: 121 K/UL
POTASSIUM SERPL-SCNC: 3.9 MMOL/L
PROT SERPL-MCNC: 6.5 G/DL
RBC # BLD: 4.68 M/UL
RBC # FLD: 15.6 %
SODIUM SERPL-SCNC: 143 MMOL/L
WBC # FLD AUTO: 6.26 K/UL

## 2025-04-28 ENCOUNTER — RX RENEWAL (OUTPATIENT)
Age: 75
End: 2025-04-28

## 2025-05-05 ENCOUNTER — APPOINTMENT (OUTPATIENT)
Dept: NEUROLOGY | Facility: CLINIC | Age: 75
End: 2025-05-05

## 2025-05-12 ENCOUNTER — APPOINTMENT (OUTPATIENT)
Dept: NEUROLOGY | Facility: CLINIC | Age: 75
End: 2025-05-12
Payer: MEDICARE

## 2025-05-12 VITALS
HEIGHT: 61 IN | SYSTOLIC BLOOD PRESSURE: 107 MMHG | WEIGHT: 110 LBS | BODY MASS INDEX: 20.77 KG/M2 | DIASTOLIC BLOOD PRESSURE: 73 MMHG | HEART RATE: 65 BPM

## 2025-05-12 DIAGNOSIS — G30.9 ALZHEIMER'S DISEASE, UNSPECIFIED: ICD-10-CM

## 2025-05-12 DIAGNOSIS — F02.80 ALZHEIMER'S DISEASE, UNSPECIFIED: ICD-10-CM

## 2025-05-12 DIAGNOSIS — G31.84 MILD COGNITIVE IMPAIRMENT, SO STATED: ICD-10-CM

## 2025-05-12 PROCEDURE — 99214 OFFICE O/P EST MOD 30 MIN: CPT

## 2025-06-04 ENCOUNTER — RX CHANGE (OUTPATIENT)
Age: 75
End: 2025-06-04

## 2025-06-04 RX ORDER — RIVASTIGMINE TARTRATE 1.5 MG/1
1.5 CAPSULE ORAL
Qty: 90 | Refills: 2 | Status: ACTIVE | COMMUNITY
Start: 1900-01-01 | End: 1900-01-01

## 2025-07-06 ENCOUNTER — NON-APPOINTMENT (OUTPATIENT)
Age: 75
End: 2025-07-06

## 2025-08-04 ENCOUNTER — NON-APPOINTMENT (OUTPATIENT)
Age: 75
End: 2025-08-04

## (undated) DEVICE — GLV 6.5 DERMAPRENE ULTRA

## (undated) DEVICE — SUT SILK 2-0 30" PSL

## (undated) DEVICE — SUT SILK 2-0 12-18"

## (undated) DEVICE — SUT MONOCRYL 5-0 18" P-3 UNDYED

## (undated) DEVICE — VENODYNE/SCD SLEEVE CALF MEDIUM

## (undated) DEVICE — SUT VICRYL 3-0 27" SH

## (undated) DEVICE — SUCTION YANKAUER BULBOUS TIP NO VENT

## (undated) DEVICE — MARKING PEN W RULER

## (undated) DEVICE — WARMING BLANKET LOWER ADULT

## (undated) DEVICE — LIGASURE EXACT DISSECTOR

## (undated) DEVICE — DRSG MASTISOL

## (undated) DEVICE — MARKING PEN DEVON DUAL TIP W RULER

## (undated) DEVICE — DRSG TEGADERM 4X4.75"

## (undated) DEVICE — SUT CHROMIC 3-0 27" SH

## (undated) DEVICE — POSITIONER FOAM EGG CRATE ULNAR 2PCS (PINK)

## (undated) DEVICE — STAPLER SKIN PROXIMATE

## (undated) DEVICE — SUT PLAIN GUT FAST ABSORBING 5-0 PC-1

## (undated) DEVICE — DRAIN SILICONE ROUND 9FR

## (undated) DEVICE — DRSG TEGADERM 2.5X3"

## (undated) DEVICE — SUT CHROMIC 4-0 27" SH-1

## (undated) DEVICE — DRAPE TOWEL BLUE 17" X 24"

## (undated) DEVICE — DRSG TELFA 4 X 8

## (undated) DEVICE — SUT MONOCRYL 4-0 27" PS-2 UNDYED

## (undated) DEVICE — RAYTEC SPONGE 4X4 16PLY

## (undated) DEVICE — DRSG STERISTRIPS 0.5 X 4"

## (undated) DEVICE — BIPOLAR FORCEP SYMMETRY BAYONET STR 8.25" X 1.5MM (BLUE)

## (undated) DEVICE — RUBBER BANDS STERILE

## (undated) DEVICE — PACK UPPER BODY

## (undated) DEVICE — PROBE PRASS SLIM MONOPOLAR STIMULATOR

## (undated) DEVICE — SUT SILK 3-0 30" SH

## (undated) DEVICE — DRAPE MAGNETIC INSTRUMENT MEDIUM

## (undated) DEVICE — SUT SILK 3-0 18" TIES

## (undated) DEVICE — SUT VICRYL 4-0 27" SH UNDYED

## (undated) DEVICE — GLV 7.5 DERMAPRENE ULTRA

## (undated) DEVICE — DRSG DERMABOND 0.7ML

## (undated) DEVICE — SUT SILK 2-0 18" TIES

## (undated) DEVICE — SPONGE PEANUT AUTO COUNT

## (undated) DEVICE — DRAIN SILICONE FLUT CHANNEL HUBLESS 10FR RND